# Patient Record
Sex: FEMALE | Race: OTHER | NOT HISPANIC OR LATINO | Employment: UNEMPLOYED | URBAN - METROPOLITAN AREA
[De-identification: names, ages, dates, MRNs, and addresses within clinical notes are randomized per-mention and may not be internally consistent; named-entity substitution may affect disease eponyms.]

---

## 2017-01-24 RX ORDER — LISINOPRIL 5 MG/1
5 TABLET ORAL DAILY
Qty: 90 TABLET | Refills: 1 | Status: SHIPPED | OUTPATIENT
Start: 2017-01-24 | End: 2017-07-10 | Stop reason: SDUPTHER

## 2017-02-24 ENCOUNTER — OFFICE VISIT (OUTPATIENT)
Dept: FAMILY MEDICINE | Facility: CLINIC | Age: 72
End: 2017-02-24
Payer: MEDICARE

## 2017-02-24 VITALS
HEIGHT: 60 IN | HEART RATE: 78 BPM | WEIGHT: 123.44 LBS | BODY MASS INDEX: 24.23 KG/M2 | SYSTOLIC BLOOD PRESSURE: 136 MMHG | DIASTOLIC BLOOD PRESSURE: 60 MMHG | RESPIRATION RATE: 14 BRPM | TEMPERATURE: 99 F

## 2017-02-24 DIAGNOSIS — J44.9 CHRONIC OBSTRUCTIVE PULMONARY DISEASE, UNSPECIFIED COPD TYPE: ICD-10-CM

## 2017-02-24 DIAGNOSIS — N39.0 URINARY TRACT INFECTION WITH HEMATURIA, SITE UNSPECIFIED: ICD-10-CM

## 2017-02-24 DIAGNOSIS — R31.9 BLOOD IN URINE: ICD-10-CM

## 2017-02-24 DIAGNOSIS — R30.0 BURNING WITH URINATION: Primary | ICD-10-CM

## 2017-02-24 DIAGNOSIS — R31.9 URINARY TRACT INFECTION WITH HEMATURIA, SITE UNSPECIFIED: ICD-10-CM

## 2017-02-24 LAB
BILIRUB SERPL-MCNC: ABNORMAL MG/DL
BLOOD URINE, POC: 250
COLOR, POC UA: ABNORMAL
GLUCOSE UR QL STRIP: NORMAL
KETONES UR QL STRIP: NEGATIVE
LEUKOCYTE ESTERASE URINE, POC: ABNORMAL
NITRITE, POC UA: POSITIVE
PH, POC UA: 5
PROTEIN, POC: NEGATIVE
SPECIFIC GRAVITY, POC UA: 1.01
UROBILINOGEN, POC UA: NORMAL

## 2017-02-24 PROCEDURE — 81002 URINALYSIS NONAUTO W/O SCOPE: CPT | Mod: S$GLB,,, | Performed by: NURSE PRACTITIONER

## 2017-02-24 PROCEDURE — 87088 URINE BACTERIA CULTURE: CPT

## 2017-02-24 PROCEDURE — 1160F RVW MEDS BY RX/DR IN RCRD: CPT | Mod: S$GLB,,, | Performed by: NURSE PRACTITIONER

## 2017-02-24 PROCEDURE — 3075F SYST BP GE 130 - 139MM HG: CPT | Mod: S$GLB,,, | Performed by: NURSE PRACTITIONER

## 2017-02-24 PROCEDURE — 3078F DIAST BP <80 MM HG: CPT | Mod: S$GLB,,, | Performed by: NURSE PRACTITIONER

## 2017-02-24 PROCEDURE — 87086 URINE CULTURE/COLONY COUNT: CPT

## 2017-02-24 PROCEDURE — 87186 SC STD MICRODIL/AGAR DIL: CPT

## 2017-02-24 PROCEDURE — 1125F AMNT PAIN NOTED PAIN PRSNT: CPT | Mod: S$GLB,,, | Performed by: NURSE PRACTITIONER

## 2017-02-24 PROCEDURE — 1159F MED LIST DOCD IN RCRD: CPT | Mod: S$GLB,,, | Performed by: NURSE PRACTITIONER

## 2017-02-24 PROCEDURE — 99499 UNLISTED E&M SERVICE: CPT | Mod: S$GLB,,, | Performed by: NURSE PRACTITIONER

## 2017-02-24 PROCEDURE — 99214 OFFICE O/P EST MOD 30 MIN: CPT | Mod: 25,S$GLB,, | Performed by: NURSE PRACTITIONER

## 2017-02-24 PROCEDURE — 1157F ADVNC CARE PLAN IN RCRD: CPT | Mod: S$GLB,,, | Performed by: NURSE PRACTITIONER

## 2017-02-24 PROCEDURE — 87077 CULTURE AEROBIC IDENTIFY: CPT

## 2017-02-24 RX ORDER — NITROFURANTOIN 25; 75 MG/1; MG/1
100 CAPSULE ORAL 2 TIMES DAILY
Qty: 14 CAPSULE | Refills: 0 | Status: SHIPPED | OUTPATIENT
Start: 2017-02-24 | End: 2017-02-27

## 2017-02-24 RX ORDER — PHENAZOPYRIDINE HYDROCHLORIDE 100 MG/1
100 TABLET, FILM COATED ORAL 3 TIMES DAILY PRN
Qty: 6 TABLET | Refills: 0 | Status: SHIPPED | OUTPATIENT
Start: 2017-02-24 | End: 2017-03-06

## 2017-02-24 NOTE — PROGRESS NOTES
Subjective:       Patient ID: Cassidy Stanford is a 71 y.o. female.    Chief Complaint: burning urination, seen blood when she wiped    Dysuria    This is a new problem. The current episode started yesterday. The problem occurs every urination. The problem has been gradually worsening. The quality of the pain is described as burning. The pain is at a severity of 5/10. The pain is moderate. There has been no fever. She is sexually active. There is no history of pyelonephritis. Associated symptoms include a discharge, frequency and hematuria. Pertinent negatives include no chills, flank pain, nausea, possible pregnancy, sweats, vomiting or weight loss. She has tried increased fluids for the symptoms. The treatment provided no relief. Her past medical history is significant for hypertension. There is no history of catheterization, diabetes insipidus, diabetes mellitus, genitourinary reflux, kidney stones, recurrent UTIs, a single kidney, STD, urinary stasis or a urological procedure.        The following portion of the patients history was reviewed and updated as appropriate: allergies, current medications, past medical and surgical history. Past social history and problem list reviewed. Family PMH and Past social history reviewed. Tobacco, Illicit drug use reviewed.     Review of Systems   Constitutional: Positive for fatigue. Negative for chills, fever and weight loss.   HENT: Negative.    Respiratory: Positive for wheezing. Negative for cough, chest tightness and shortness of breath.         COPD/Asthma. Using her inhaler daily. Having more wheezing lately due to pollen.    Cardiovascular: Negative for chest pain and palpitations.   Gastrointestinal: Negative for abdominal pain, diarrhea, nausea and vomiting.   Genitourinary: Positive for dysuria, frequency and hematuria. Negative for flank pain.   Musculoskeletal: Negative for back pain.   Neurological: Negative for dizziness and headaches.       Objective:     BP  136/60  Pulse 78  Temp 98.8 °F (37.1 °C) (Oral)   Resp 14  Ht 5' (1.524 m)  Wt 56 kg (123 lb 7.3 oz)  BMI 24.11 kg/m2     Physical Exam     Constitutional: oriented to person, place, and time. well-developed and well-nourished.   Head: Normocephalic.   Eyes: Conjunctivae are normal. Pupils are equal, round, and reactive to light.   Cardiovascular: Normal rate, regular rhythm and normal heart sounds.    Pulmonary/Chest: Effort normal. No distress. End exp wheezing mild. No rales or rhonchi.   Abdominal: Soft. Bowel sounds are normal. No distension. There is no tenderness.   Musculoskeletal: Normal range of motion. Gait and coordination normal   Neurological: oriented to person, place, and time.   Skin: Skin is warm and dry. No rashes or lesions  Psychiatric: Normal mood and affect.Behavior is normal. Judgment and thought content normal.   Assessment:       1. Burning with urination    2. Blood in urine    3. Urinary tract infection with hematuria, site unspecified        Plan:         Cassidy was seen today for burning urination, seen blood when she wiped.    Diagnoses and all orders for this visit:    Burning with urination  -     POCT urine dipstick without microscope    Blood in urine  -     POCT urine dipstick without microscope    Urinary tract infection with hematuria, site unspecified: will send urine for culture. Treat with Macrobid BID.  Pyridium for pain. Increase fluids.  -     Urine culture    Chronic obstructive pulmonary disease, unspecified COPD type: continue to use inhaler and nebulizer.     Other orders  -     nitrofurantoin, macrocrystal-monohydrate, (MACROBID) 100 MG capsule; Take 1 capsule (100 mg total) by mouth 2 (two) times daily.  -     phenazopyridine (PYRIDIUM) 100 MG tablet; Take 1 tablet (100 mg total) by mouth 3 (three) times daily as needed for Pain.    Continue current medication  Take medications only as prescribed  Healthy diet, exercise  Adequate rest  Adequate hydration  Avoid  allergens  Avoid excessive caffeine

## 2017-02-24 NOTE — MR AVS SNAPSHOT
[place] [address] [address]  [facility] [address]  Phone: [phone]  Fax: [phone]                  Cassidy Stanford   2017 8:00 AM   Office Visit    Description:  Female : 1945   Provider:  Neha Reese NP   Department:  [facility]           Reason for Visit     burning urination, seen blood when she wiped           Diagnoses this Visit        Comments    Burning with urination    -  Primary     Blood in urine         Urinary tract infection with hematuria, site unspecified                To Do List           Future Appointments        Provider Department Dept Phone    2017 10:40 AM Coltno Brito MD [place] [facility] [phone]    2017 8:30 AM LAB, [place] [facility] [phone]    2017 9:00 AM [facility] DEXA1 [facility] [phone]      Goals (5 Years of Data)     None      Follow-Up and Disposition     Return in about 1 week (around 3/3/2017) for if symptoms are not improving or worsen..       These Medications        Disp Refills Start End    nitrofurantoin, macrocrystal-monohydrate, (MACROBID) 100 MG capsule 14 capsule 0 2017 3/3/2017    Take 1 capsule (100 mg total) by mouth 2 (two) times daily. - Oral    Pharmacy: [place] [address] [address]  Ph #: [phone]       phenazopyridine (PYRIDIUM) 100 MG tablet 6 tablet 0 2017 3/6/2017    Take 1 tablet (100 mg total) by mouth 3 (three) times daily as needed for Pain. - Oral    Pharmacy: [address]  Ph #: [phone]         [facility] On Call     [facility] Care [facility] -  Assistance  Registered nurses in the [facility] provide clinical advisement, health education, appointment booking, and other advisory services.  Call for this free service at [phone].              Medications           Message regarding Medications     Verify the changes and/or additions to your medication regime listed below are the same as discussed with your clinician today.  If any of these changes or additions are incorrect, please notify your healthcare provider.        START taking these NEW medications        Refills    nitrofurantoin, macrocrystal-monohydrate, (MACROBID) 100 MG capsule 0    Sig: Take 1 capsule (100 mg total) by mouth 2 (two) times daily.    Class: Normal    Route: Oral    phenazopyridine (PYRIDIUM) 100 MG tablet 0    Sig: Take 1 tablet (100 mg total) by mouth 3 (three) times daily as needed for Pain.    Class: Normal    Route: Oral           Verify that the below list of medications is an accurate representation of the medications you are currently taking.  If none reported, the list may be blank. If incorrect, please contact your healthcare provider. Carry this list with you in case of emergency.           Current Medications     aspirin 81 mg Tab Take 1 tablet by mouth Daily.    CALCIUM CARBONATE/VITAMIN D3 (CALCIUM 500 + D ORAL) Take 1 tablet by mouth once daily.    cholecalciferol, vitamin D3, (VITAMIN D3) 5,000 unit Tab Take 5,000 Units by mouth twice a week.    fluticasone-vilanterol (BREO ELLIPTA) 200-25 mcg/dose DsDv diskus inhaler Inhale 1 puff into the lungs once daily.    glucosamine-chondroitin 500-400 mg tablet Take 1 tablet by mouth once daily.     lisinopril (PRINIVIL,ZESTRIL) 5 MG tablet Take 1 tablet (5 mg total) by mouth once daily.    pravastatin (PRAVACHOL) 20 MG tablet TAKE 1 TABLET (20 MG TOTAL) BY MOUTH ONCE DAILY.    umeclidinium (INCRUSE ELLIPTA) 62.5 mcg/actuation DsDv Inhale 1 puff into the lungs once daily.    albuterol (ACCUNEB) 1.25 mg/3 mL Nebu Take 1.25 mg by nebulization every 6 (six) hours as needed.    nitrofurantoin, macrocrystal-monohydrate, (MACROBID) 100 MG capsule Take 1 capsule (100 mg total) by mouth 2 (two) times daily.    phenazopyridine  (PYRIDIUM) 100 MG tablet Take 1 tablet (100 mg total) by mouth 3 (three) times daily as needed for Pain.           Clinical Reference Information           Your Vitals Were     BP                   136/60           Blood Pressure          Most Recent Value    BP  136/60      Allergies as of 2/24/2017     Penicillins    Sulfa (Sulfonamide Antibiotics)      Immunizations Administered on Date of Encounter - 2/24/2017     None      Orders Placed During Today's Visit      Normal Orders This Visit    POCT urine dipstick without microscope       Language Assistance Services     ATTENTION: Language assistance services are available, free of charge. Please call [phone].      ATENCIÓN: Si habla fatmata, tiene a boyd disposición servicios gratuitos de asistencia lingüística. Llame al [phone].     DARCI Ý: N?u b?n nói Ti?ng Vi?t, có các d?ch v? h? tr? ngôn ng? mi?n phí dành cho b?n. G?i s? [phone].         [facility] complies with applicable Federal civil rights laws and does not discriminate on the basis of race, color, national origin, age, disability, or sex.

## 2017-02-24 NOTE — PROGRESS NOTES
Answers for HPI/ROS submitted by the patient on 2/24/2017   Dysuria  Chronicity: new  Onset: yesterday  Frequency: every urination  Progression since onset: gradually worsening  Pain quality: burning  Pain - numeric: 5/10  Fever: no fever  Sexually active?: Yes  History of pyelonephritis?: No  chills: No  discharge: Yes  flank pain: No  frequency: Yes  hematuria: Yes  nausea: No  possible pregnancy: No  sweats: No  vomiting: No  weight loss: No  Treatments tried: nothing  Pain severity: moderate  catheterization: No  diabetes insipidus: No  diabetes mellitus: No  genitourinary reflux: No  hypertension: Yes  recurrent UTIs: No  single kidney: No  STD: No  urinary stasis: No  urological procedure: No  kidney stones: No

## 2017-02-27 ENCOUNTER — PATIENT MESSAGE (OUTPATIENT)
Dept: FAMILY MEDICINE | Facility: CLINIC | Age: 72
End: 2017-02-27

## 2017-02-27 LAB — BACTERIA UR CULT: NORMAL

## 2017-02-27 RX ORDER — CIPROFLOXACIN 250 MG/1
250 TABLET, FILM COATED ORAL 2 TIMES DAILY
Qty: 10 TABLET | Refills: 0 | Status: SHIPPED | OUTPATIENT
Start: 2017-02-27 | End: 2017-03-04

## 2017-02-27 NOTE — TELEPHONE ENCOUNTER
Her urine culture grew Proteus Mirabilis. Need to change the macrobid to Cipro. Will send new script to the pharmacy.

## 2017-03-08 NOTE — TELEPHONE ENCOUNTER
Did I need to do anything for her? Does she need the pyridium resent or is everything all right?

## 2017-03-08 NOTE — TELEPHONE ENCOUNTER
Spoke with patients pharmacy they stated that patient picked up antibiotic but did not get pain medication.

## 2017-04-20 ENCOUNTER — OFFICE VISIT (OUTPATIENT)
Dept: OPTOMETRY | Facility: CLINIC | Age: 72
End: 2017-04-20
Payer: MEDICARE

## 2017-04-20 DIAGNOSIS — H52.4 MYOPIA WITH ASTIGMATISM AND PRESBYOPIA, BILATERAL: ICD-10-CM

## 2017-04-20 DIAGNOSIS — H52.203 MYOPIA WITH ASTIGMATISM AND PRESBYOPIA, BILATERAL: ICD-10-CM

## 2017-04-20 DIAGNOSIS — H52.13 MYOPIA WITH ASTIGMATISM AND PRESBYOPIA, BILATERAL: ICD-10-CM

## 2017-04-20 DIAGNOSIS — H43.813 POSTERIOR VITREOUS DETACHMENT, BILATERAL: ICD-10-CM

## 2017-04-20 DIAGNOSIS — Z13.5 GLAUCOMA SCREENING: ICD-10-CM

## 2017-04-20 DIAGNOSIS — H25.13 NUCLEAR SCLEROSIS, BILATERAL: Primary | ICD-10-CM

## 2017-04-20 PROCEDURE — 92015 DETERMINE REFRACTIVE STATE: CPT | Mod: S$GLB,,, | Performed by: OPTOMETRIST

## 2017-04-20 PROCEDURE — 99999 PR PBB SHADOW E&M-EST. PATIENT-LVL II: CPT | Mod: PBBFAC,,, | Performed by: OPTOMETRIST

## 2017-04-20 PROCEDURE — 92004 COMPRE OPH EXAM NEW PT 1/>: CPT | Mod: S$GLB,,, | Performed by: OPTOMETRIST

## 2017-04-20 NOTE — MR AVS SNAPSHOT
[place] [facility]  [address] [facility] [address]  [facility] [address]  Phone: [phone]  Fax: [phone]                  Cassidy Stanford   2017 8:00 AM   Office Visit    Description:  Female : 1945   Provider:  ALEX Logan OD   Department:  [place] [facility]           Reason for Visit     Concerns About Ocular Health           Diagnoses this Visit        Comments    Nuclear sclerosis, bilateral    -  Primary     Posterior vitreous detachment, bilateral         Glaucoma screening         Myopia with astigmatism and presbyopia, bilateral                To Do List           Future Appointments        Provider Department Dept Phone    2017 10:40 AM Colton Brito MD [facility] Cardiology [phone]    2017 8:30 AM LAB, [place] [facility] [phone]    2017 9:00 AM [place] DEXA1 [facility] [phone]      Goals (5 Years of Data)     None      Follow-Up and Disposition     Return in about 1 year (around 2018) for Annual Eye Exam.      [facility]     [facility] Care [facility] -  Assistance  Unless otherwise directed by your provider, please contact [facility], our nurse care line that is available for  assistance.     Registered nurses in the [facility] provide: appointment scheduling, clinical advisement, health education, and other advisory services.  Call: [phone] (toll free)               Medications           Message regarding Medications     Verify the changes and/or additions to your medication regime listed below are the same as discussed with your clinician today.  If any of these changes or additions are incorrect, please notify your healthcare provider.             Verify that the below list of medications is an accurate representation of the medications you are currently taking.  If none reported, the list may be blank. If incorrect, please contact your healthcare provider.  Carry this list with you in case of emergency.           Current Medications     albuterol (ACCUNEB) 1.25 mg/3 mL Nebu Take 1.25 mg by nebulization every 6 (six) hours as needed.    aspirin 81 mg Tab Take 1 tablet by mouth Daily.    CALCIUM CARBONATE/VITAMIN D3 (CALCIUM 500 + D ORAL) Take 1 tablet by mouth once daily.    cholecalciferol, vitamin D3, (VITAMIN D3) 5,000 unit Tab Take 5,000 Units by mouth twice a week.    fluticasone-vilanterol (BREO ELLIPTA) 200-25 mcg/dose DsDv diskus inhaler Inhale 1 puff into the lungs once daily.    glucosamine-chondroitin 500-400 mg tablet Take 1 tablet by mouth once daily.     lisinopril (PRINIVIL,ZESTRIL) 5 MG tablet Take 1 tablet (5 mg total) by mouth once daily.    pravastatin (PRAVACHOL) 20 MG tablet TAKE 1 TABLET (20 MG TOTAL) BY MOUTH ONCE DAILY.    umeclidinium (INCRUSE ELLIPTA) 62.5 mcg/actuation DsDv Inhale 1 puff into the lungs once daily.           Clinical Reference Information           Allergies as of 4/20/2017     Penicillins    Sulfa (Sulfonamide Antibiotics)      Immunizations Administered on Date of Encounter - 4/20/2017     None      Instructions    FLASHES / FLOATERS / POSTERIOR VITREOUS DETACHMENT    Call the clinic if you have any further changes in symptoms.  Including:  Increased numbers of floaters or flashing lights, dimness or darkness that moves through or stays constant in your vision, or any pain in the eye (s).            Early Cataracts--not visually significant for surgery consultation.    What Are Cataracts?  A clear lens in the eye focuses light. This lets the eye see images sharply. With age, the lens slowly becomes cloudy. The cloudy lens is a cataract. A cataract scatters light and makes it hard for the eye to focus. Cataracts often form in both eyes. But one lens may cloud faster than the other.      The Aging of Your Lens    Your lens may cloud so slowly that you don`t notice any vision changes at first. But as the cataract gets worse, the  "eye has a harder time focusing. In early stages, glasses may help you see better. As the lens gets cloudier, your doctor may recommend surgery to restore your vision.  DRY EYES:  Use Over The Counter artificial tears as needed for dry eye symptoms.  Some common brands include:  Systane, Optive, and Refresh.  These drops can be used as frequently as desired, but may be most helpful use during long periods of concentrated work.  For example, reading / working at the computer.  Avoid drops that "get redness out", as these contain medication that may further irritate the eyes.    ALLERGY EYES / SYMPTOMS:    Over the counter medications include--Zaditor and Alaway  Use as directed 1-2 drops daily for symptoms of itching / watering eyes.  These drops will not help for dry eye or exposure symptoms.           Language Assistance Services     ATTENTION: Language assistance services are available, free of charge. Please call [phone].      ATENCIÓN: Si habla anahumble, tiene a boyd disposición servicios gratuitos de asistencia lingüística. Llame al [phone].     DARCI Ý: N?u b?n nói Ti?ng Vi?t, có các d?ch v? h? tr? ngôn ng? mi?n phí dành cho b?n. G?i s? [phone].         [facility] complies with applicable Federal civil rights laws and does not discriminate on the basis of race, color, national origin, age, disability, or sex.        "

## 2017-04-20 NOTE — PROGRESS NOTES
HPI     Concerns About Ocular Health    Additional comments: JOSE RAFAEL Logan 9/13           Comments   Agree above  Notes slightly reduced OU         Last edited by ALEX Logan, OD on 4/20/2017  8:13 AM. (History)            Assessment /Plan     For exam results, see Encounter Report.    Nuclear sclerosis, bilateral    Posterior vitreous detachment, bilateral    Glaucoma screening    Myopia with astigmatism and presbyopia, bilateral      1. Vis sig OD>OS, not ready for consult  Gave info, cautions night driving  2. RD precautions given  3. Low suspect, mod angles, mod-large c/d, low teens IOP  Monitor  4. Updated specs rx, gave copy, fill prn    Discussed and educated patient on current findings /plan.  RTC 1 year, prn if any changes / issues

## 2017-04-20 NOTE — PATIENT INSTRUCTIONS
"FLASHES / FLOATERS / POSTERIOR VITREOUS DETACHMENT    Call the clinic if you have any further changes in symptoms.  Including:  Increased numbers of floaters or flashing lights, dimness or darkness that moves through or stays constant in your vision, or any pain in the eye (s).            Early Cataracts--not visually significant for surgery consultation.    What Are Cataracts?  A clear lens in the eye focuses light. This lets the eye see images sharply. With age, the lens slowly becomes cloudy. The cloudy lens is a cataract. A cataract scatters light and makes it hard for the eye to focus. Cataracts often form in both eyes. But one lens may cloud faster than the other.      The Aging of Your Lens    Your lens may cloud so slowly that you don`t notice any vision changes at first. But as the cataract gets worse, the eye has a harder time focusing. In early stages, glasses may help you see better. As the lens gets cloudier, your doctor may recommend surgery to restore your vision.  DRY EYES:  Use Over The Counter artificial tears as needed for dry eye symptoms.  Some common brands include:  Systane, Optive, and Refresh.  These drops can be used as frequently as desired, but may be most helpful use during long periods of concentrated work.  For example, reading / working at the computer.  Avoid drops that "get redness out", as these contain medication that may further irritate the eyes.    ALLERGY EYES / SYMPTOMS:    Over the counter medications include--Zaditor and Alaway  Use as directed 1-2 drops daily for symptoms of itching / watering eyes.  These drops will not help for dry eye or exposure symptoms.      "

## 2017-04-27 ENCOUNTER — OFFICE VISIT (OUTPATIENT)
Dept: CARDIOLOGY | Facility: CLINIC | Age: 72
End: 2017-04-27
Payer: MEDICARE

## 2017-04-27 VITALS
HEIGHT: 59 IN | SYSTOLIC BLOOD PRESSURE: 120 MMHG | HEART RATE: 66 BPM | BODY MASS INDEX: 24.62 KG/M2 | DIASTOLIC BLOOD PRESSURE: 66 MMHG | WEIGHT: 122.13 LBS

## 2017-04-27 DIAGNOSIS — J43.8 OTHER EMPHYSEMA: ICD-10-CM

## 2017-04-27 DIAGNOSIS — I65.23 BILATERAL CAROTID ARTERY STENOSIS: Primary | ICD-10-CM

## 2017-04-27 DIAGNOSIS — I10 ESSENTIAL HYPERTENSION: Chronic | ICD-10-CM

## 2017-04-27 DIAGNOSIS — E78.2 MIXED HYPERLIPIDEMIA: Chronic | ICD-10-CM

## 2017-04-27 DIAGNOSIS — I51.7 CARDIOMEGALY: ICD-10-CM

## 2017-04-27 PROCEDURE — 1160F RVW MEDS BY RX/DR IN RCRD: CPT | Mod: S$GLB,,, | Performed by: INTERNAL MEDICINE

## 2017-04-27 PROCEDURE — 99499 UNLISTED E&M SERVICE: CPT | Mod: S$GLB,,, | Performed by: INTERNAL MEDICINE

## 2017-04-27 PROCEDURE — 3074F SYST BP LT 130 MM HG: CPT | Mod: S$GLB,,, | Performed by: INTERNAL MEDICINE

## 2017-04-27 PROCEDURE — 99214 OFFICE O/P EST MOD 30 MIN: CPT | Mod: S$GLB,,, | Performed by: INTERNAL MEDICINE

## 2017-04-27 PROCEDURE — 99999 PR PBB SHADOW E&M-EST. PATIENT-LVL III: CPT | Mod: PBBFAC,,, | Performed by: INTERNAL MEDICINE

## 2017-04-27 PROCEDURE — 1159F MED LIST DOCD IN RCRD: CPT | Mod: S$GLB,,, | Performed by: INTERNAL MEDICINE

## 2017-04-27 PROCEDURE — 1126F AMNT PAIN NOTED NONE PRSNT: CPT | Mod: S$GLB,,, | Performed by: INTERNAL MEDICINE

## 2017-04-27 PROCEDURE — 1157F ADVNC CARE PLAN IN RCRD: CPT | Mod: S$GLB,,, | Performed by: INTERNAL MEDICINE

## 2017-04-27 PROCEDURE — 3078F DIAST BP <80 MM HG: CPT | Mod: S$GLB,,, | Performed by: INTERNAL MEDICINE

## 2017-04-27 NOTE — MR AVS SNAPSHOT
[facility]  [address]  [place] [address]  Phone: [phone]                  Cassidy Stanford   2017 10:40 AM   Office Visit    Description:  Female : 1945   Provider:  Colton Brito MD   Department:  [facility]           Reason for Visit     Follow-up     Hypertension     Hyperlipidemia           Diagnoses this Visit        Comments    Bilateral carotid artery stenosis    -  Primary     Essential hypertension         Mixed hyperlipidemia         Other emphysema         Cardiomegaly                To Do List           Future Appointments        Provider Department Dept Phone    2017 8:30 AM LAB, [place] [facility] [phone]    2017 9:00 AM [place] DEXA1 [facility] [phone]      Goals (5 Years of Data)     None      Follow-Up and Disposition     Return in about 1 year (around 2018).      [facility]     [facility] Nurse Care Line -  Assistance  Unless otherwise directed by your provider, please contact [facility], our nurse care line that is available for  assistance.     Registered nurses in the [facility] provide: appointment scheduling, clinical advisement, health education, and other advisory services.  Call: [phone] (toll free)               Medications           Message regarding Medications     Verify the changes and/or additions to your medication regime listed below are the same as discussed with your clinician today.  If any of these changes or additions are incorrect, please notify your healthcare provider.             Verify that the below list of medications is an accurate representation of the medications you are currently taking.  If none reported, the list may be blank. If incorrect, please contact your healthcare provider. Carry this list with you in case of emergency.           Current Medications     albuterol (ACCUNEB) 1.25 mg/3 mL Nebu Take  "1.25 mg by nebulization every 6 (six) hours as needed.    aspirin 81 mg Tab Take 1 tablet by mouth Daily.    CALCIUM CARBONATE/VITAMIN D3 (CALCIUM 500 + D ORAL) Take 1 tablet by mouth once daily.    cholecalciferol, vitamin D3, (VITAMIN D3) 5,000 unit Tab Take 5,000 Units by mouth twice a week.    fluticasone-vilanterol (BREO ELLIPTA) 200-25 mcg/dose DsDv diskus inhaler Inhale 1 puff into the lungs once daily.    glucosamine-chondroitin 500-400 mg tablet Take 1 tablet by mouth once daily.     lisinopril (PRINIVIL,ZESTRIL) 5 MG tablet Take 1 tablet (5 mg total) by mouth once daily.    pravastatin (PRAVACHOL) 20 MG tablet TAKE 1 TABLET (20 MG TOTAL) BY MOUTH ONCE DAILY.    umeclidinium (INCRUSE ELLIPTA) 62.5 mcg/actuation DsDv Inhale 1 puff into the lungs once daily.           Clinical Reference Information           Your Vitals Were     BP Pulse Height Weight BMI    120/66 66 4' 11" (1.499 m) 55.4 kg (122 lb 2.2 oz) 24.67 kg/m2      Blood Pressure          Most Recent Value    BP  120/66      Allergies as of 4/27/2017     Penicillins    Sulfa (Sulfonamide Antibiotics)      Immunizations Administered on Date of Encounter - 4/27/2017     None      Orders Placed During Today's Visit     Future Labs/Procedures Expected by Expires    NM Myocardial Perfusion Spect Multi Exer  4/27/2017 4/27/2018    NM Multi Tread Stress Cardiac Component  As directed 4/27/2018      Language Assistance Services     ATTENTION: Language assistance services are available, free of charge. Please call [phone].      ATENCIÓN: Si clairela fatmata, tiene a boyd disposición servicios gratuitos de asistencia lingüística. Llame al [phone].     DARCI Ý: N?u b?n nói Ti?ng Vi?t, có các d?ch v? h? tr? ngôn ng? mi?n phí dành cho b?n. G?i s? [phone].         [facility] complies with applicable Federal civil rights laws and does not discriminate on the basis of race, color, national origin, age, disability, or sex.        "

## 2017-04-27 NOTE — PROGRESS NOTES
Subjective:    Patient ID:  Cassidy Stanford is a 71 y.o. female who presents for follow-up of Follow-up (follow up); Hypertension; and Hyperlipidemia      HPI   Here for follow up of HTN/SOB/fam hx CAD/carotid dsx. Admits to increased SOB improved with inhaler but persistent. Patient is exercising regularly with out symptoms.Patients states is doing well no chest pain, SOB or change in exertional tolerence.     Review of Systems   Constitution: Negative for chills, decreased appetite, weakness, malaise/fatigue and night sweats.   HENT: Negative for congestion, headaches and nosebleeds.    Eyes: Negative for blurred vision and visual disturbance.   Cardiovascular: Negative for chest pain, claudication, cyanosis, dyspnea on exertion, irregular heartbeat, leg swelling, near-syncope, orthopnea, palpitations, paroxysmal nocturnal dyspnea and syncope.   Respiratory: Negative for cough and shortness of breath.    Endocrine: Negative for polyuria.   Hematologic/Lymphatic: Does not bruise/bleed easily.   Skin: Negative for flushing and rash.   Musculoskeletal: Negative for arthritis, back pain, falls, joint pain, joint swelling, muscle cramps, muscle weakness and myalgias.   Gastrointestinal: Negative for bloating, abdominal pain, change in bowel habit, heartburn, nausea and vomiting.   Genitourinary: Negative for bladder incontinence and urgency.   Neurological: Negative for dizziness, focal weakness, light-headedness and loss of balance.   Psychiatric/Behavioral: Negative for altered mental status.        Objective:    Physical Exam   Constitutional: She is oriented to person, place, and time. She appears well-developed and well-nourished.   HENT:   Head: Normocephalic.   Eyes: Conjunctivae are normal.   Neck: Normal range of motion. Neck supple. No JVD present.   Cardiovascular: Normal rate, regular rhythm, normal heart sounds and intact distal pulses.    Pulses:       Carotid pulses are 2+ on the right side, and 2+ on  the left side.       Radial pulses are 2+ on the right side, and 2+ on the left side.        Dorsalis pedis pulses are 2+ on the right side, and 2+ on the left side.        Posterior tibial pulses are 2+ on the right side, and 2+ on the left side.   Pulmonary/Chest: Effort normal and breath sounds normal.   Abdominal: Soft. Bowel sounds are normal.   Musculoskeletal: She exhibits no edema or tenderness.   Neurological: She is alert and oriented to person, place, and time. Gait normal.   Skin: Skin is warm, dry and intact. No cyanosis. Nails show no clubbing.   Psychiatric: She has a normal mood and affect. Her speech is normal and behavior is normal. Thought content normal.             ..    Chemistry        Component Value Date/Time     11/01/2016 0806    K 3.6 11/01/2016 0806     11/01/2016 0806    CO2 24 11/01/2016 0806    BUN 13 11/01/2016 0806    CREATININE 0.8 11/01/2016 0806    GLU 92 11/01/2016 0806        Component Value Date/Time    CALCIUM 8.6 (L) 11/01/2016 0806    ALKPHOS 74 11/01/2016 0806    AST 19 11/01/2016 0806    ALT 13 11/01/2016 0806    BILITOT 0.3 11/01/2016 0806            ..  Lab Results   Component Value Date    CHOL 128 11/01/2016    CHOL 176 04/04/2016    CHOL 196 09/29/2015     Lab Results   Component Value Date    HDL 46 11/01/2016    HDL 70 04/04/2016    HDL 62 09/29/2015     Lab Results   Component Value Date    LDLCALC 68.8 11/01/2016    LDLCALC 96.6 04/04/2016    LDLCALC 124.0 09/29/2015     Lab Results   Component Value Date    TRIG 66 11/01/2016    TRIG 47 04/04/2016    TRIG 50 09/29/2015     Lab Results   Component Value Date    CHOLHDL 35.9 11/01/2016    CHOLHDL 39.8 04/04/2016    CHOLHDL 31.6 09/29/2015     ..  Lab Results   Component Value Date    WBC 5.10 11/01/2016    HGB 13.3 11/01/2016    HCT 40.2 11/01/2016    MCV 92 11/01/2016     11/01/2016       Test(s) Reviewed  I have reviewed the following in detail:  [] Stress test   [] Angiography   [x]  Echocardiogram   [x] Labs   [] Other:       Assessment:         ICD-10-CM ICD-9-CM   1. Bilateral carotid artery stenosis I65.23 433.10     433.30   2. Essential hypertension I10 401.9   3. Mixed hyperlipidemia E78.2 272.2   4. Other emphysema J43.8 492.8     Problem List Items Addressed This Visit     Carotid artery stenosis - Primary    Overview     There is 40 - 49% right Internal Carotid stenosis.  There is 20 - 39% left Internal Carotid stenosis.  09/09/2014          Chronic obstructive pulmonary disease    Essential hypertension (Chronic)    Hyperlipidemia (Chronic)           Plan:           Return to clinic 1 year   Low level/low impact aerobic exercise 5x's/wk. Heart healthy diet and risk factor modification.    See labs and med orders.

## 2017-04-29 ENCOUNTER — NURSE TRIAGE (OUTPATIENT)
Dept: ADMINISTRATIVE | Facility: CLINIC | Age: 72
End: 2017-04-29

## 2017-04-30 NOTE — TELEPHONE ENCOUNTER
"  Reason for Disposition   Blood in urine (red, pink, or tea-colored)    Answer Assessment - Initial Assessment Questions  1. SEVERITY: "How bad is the pain?"  (e.g., Scale 1-10; mild, moderate, or severe)    - MILD (1-3): complains slightly about urination hurting    - MODERATE (4-7): interferes with normal activities      - SEVERE (8-10): excruciating, unwilling or unable to urinate because of the pain       5/10  2. FREQUENCY: "How many times have you had painful urination today?"       5  3. PATTERN: "Is pain present every time you urinate or just sometimes?"       Every time today  4. ONSET: "When did the painful urination start?"       This morning  5. FEVER: "Do you have a fever?" If so, ask: "What is your temperature, how was it measured, and when did it start?"      No  6. PAST UTI: "Have you had a urine infection before?" If so, ask: "When was the last time?" and "What happened that time?"       Yes.  In Feb. Given antibiotics. Feels the same.  7. CAUSE: "What do you think is causing the painful urination?"  (e.g., UTI, scratch, Herpes sore)      UTI  8. OTHER SYMPTOMS: "Do you have any other symptoms?" (e.g., flank pain, vaginal discharge, genital sores, urgency, blood in urine)      Blood on tissue x 1 when she wiped., urgency  9. PREGNANCY: "Is there any chance you are pregnant?" "When was your last menstrual period?"      No    Protocols used: ST URINATION PAIN - FEMALE-A-    "

## 2017-05-01 ENCOUNTER — OFFICE VISIT (OUTPATIENT)
Dept: FAMILY MEDICINE | Facility: CLINIC | Age: 72
End: 2017-05-01
Payer: MEDICARE

## 2017-05-01 VITALS
TEMPERATURE: 98 F | HEART RATE: 67 BPM | HEIGHT: 59 IN | WEIGHT: 123.5 LBS | SYSTOLIC BLOOD PRESSURE: 108 MMHG | DIASTOLIC BLOOD PRESSURE: 68 MMHG | BODY MASS INDEX: 24.9 KG/M2

## 2017-05-01 DIAGNOSIS — R39.9 UTI SYMPTOMS: Primary | ICD-10-CM

## 2017-05-01 DIAGNOSIS — J45.909 UNCOMPLICATED ASTHMA, UNSPECIFIED ASTHMA SEVERITY: ICD-10-CM

## 2017-05-01 LAB
BILIRUB SERPL-MCNC: ABNORMAL MG/DL
BLOOD URINE, POC: 250
COLOR, POC UA: YELLOW
GLUCOSE UR QL STRIP: ABNORMAL
KETONES UR QL STRIP: ABNORMAL
LEUKOCYTE ESTERASE URINE, POC: ABNORMAL
NITRITE, POC UA: ABNORMAL
PH, POC UA: 5
PROTEIN, POC: ABNORMAL
SPECIFIC GRAVITY, POC UA: CLEAR
UROBILINOGEN, POC UA: ABNORMAL

## 2017-05-01 PROCEDURE — 3074F SYST BP LT 130 MM HG: CPT | Mod: S$GLB,,, | Performed by: NURSE PRACTITIONER

## 2017-05-01 PROCEDURE — 3078F DIAST BP <80 MM HG: CPT | Mod: S$GLB,,, | Performed by: NURSE PRACTITIONER

## 2017-05-01 PROCEDURE — 1159F MED LIST DOCD IN RCRD: CPT | Mod: S$GLB,,, | Performed by: NURSE PRACTITIONER

## 2017-05-01 PROCEDURE — 1126F AMNT PAIN NOTED NONE PRSNT: CPT | Mod: S$GLB,,, | Performed by: NURSE PRACTITIONER

## 2017-05-01 PROCEDURE — 87088 URINE BACTERIA CULTURE: CPT

## 2017-05-01 PROCEDURE — 1160F RVW MEDS BY RX/DR IN RCRD: CPT | Mod: S$GLB,,, | Performed by: NURSE PRACTITIONER

## 2017-05-01 PROCEDURE — 99214 OFFICE O/P EST MOD 30 MIN: CPT | Mod: S$GLB,,, | Performed by: NURSE PRACTITIONER

## 2017-05-01 PROCEDURE — 99499 UNLISTED E&M SERVICE: CPT | Mod: S$GLB,,, | Performed by: NURSE PRACTITIONER

## 2017-05-01 PROCEDURE — 1157F ADVNC CARE PLAN IN RCRD: CPT | Mod: S$GLB,,, | Performed by: NURSE PRACTITIONER

## 2017-05-01 PROCEDURE — 87086 URINE CULTURE/COLONY COUNT: CPT

## 2017-05-01 PROCEDURE — 81002 URINALYSIS NONAUTO W/O SCOPE: CPT | Mod: S$GLB,,, | Performed by: NURSE PRACTITIONER

## 2017-05-01 PROCEDURE — 87077 CULTURE AEROBIC IDENTIFY: CPT

## 2017-05-01 PROCEDURE — 87186 SC STD MICRODIL/AGAR DIL: CPT

## 2017-05-01 RX ORDER — IBUPROFEN 100 MG/5ML
1000 SUSPENSION, ORAL (FINAL DOSE FORM) ORAL 2 TIMES DAILY
COMMUNITY

## 2017-05-01 RX ORDER — MONTELUKAST SODIUM 10 MG/1
10 TABLET ORAL NIGHTLY
Qty: 30 TABLET | Refills: 6 | Status: SHIPPED | OUTPATIENT
Start: 2017-05-01 | End: 2017-05-12 | Stop reason: SDUPTHER

## 2017-05-01 NOTE — MR AVS SNAPSHOT
[facility]  [address] [address] [address]  [facility] [address]  Phone: [phone]  Fax: [phone]                  Cassidy Stanford   2017 3:20 PM   Office Visit    Description:  Female : 1945   Provider:  Elmira Da Silva NP   Department:  [facility]           Reason for Visit     Urinary Tract Infection           Diagnoses this Visit        Comments    UTI symptoms    -  Primary            To Do List           Future Appointments        Provider Department Dept Phone    2017 8:30 AM LAB, [place] [facility] [phone]    2017 9:00 AM [facility] DEXA1 [facility] [phone]    2017 1:00 PM NM1 [facility] [phone]    2017 2:30 PM STRESS, [facility] [phone]      Goals (5 Years of Data)     None       These Medications        Disp Refills Start End    montelukast (SINGULAIR) 10 mg tablet 30 tablet 6 2017    Take 1 tablet (10 mg total) by mouth every evening. - Oral    Pharmacy: [address]  #: [phone]         [facility]     [facility] Care [facility] -  Assistance  Unless otherwise directed by your provider, please contact [facility], our nurse care line that is available for  assistance.     Registered nurses in the [facility] provide: appointment scheduling, clinical advisement, health education, and other advisory services.  Call: [phone] (toll free)               Medications           Message regarding Medications     Verify the changes and/or additions to your medication regime listed below are the same as discussed with your clinician today.  If any of these changes or additions are incorrect, please notify your healthcare provider.        START taking these NEW medications        Refills    montelukast (SINGULAIR)  "10 mg tablet 6    Sig: Take 1 tablet (10 mg total) by mouth every evening.    Class: Normal    Route: Oral           Verify that the below list of medications is an accurate representation of the medications you are currently taking.  If none reported, the list may be blank. If incorrect, please contact your healthcare provider. Carry this list with you in case of emergency.           Current Medications     albuterol (ACCUNEB) 1.25 mg/3 mL Nebu Take 1.25 mg by nebulization every 6 (six) hours as needed.    ascorbic acid, vitamin C, (VITAMIN C) 1000 MG tablet Take 1,000 mg by mouth once daily.    aspirin 81 mg Tab Take 1 tablet by mouth Daily.    CALCIUM CARBONATE/VITAMIN D3 (CALCIUM 500 + D ORAL) Take 1 tablet by mouth once daily.    cholecalciferol, vitamin D3, (VITAMIN D3) 5,000 unit Tab Take 5,000 Units by mouth twice a week.    fluticasone-vilanterol (BREO ELLIPTA) 200-25 mcg/dose DsDv diskus inhaler Inhale 1 puff into the lungs once daily.    glucosamine-chondroitin 500-400 mg tablet Take 1 tablet by mouth once daily.     lisinopril (PRINIVIL,ZESTRIL) 5 MG tablet Take 1 tablet (5 mg total) by mouth once daily.    pravastatin (PRAVACHOL) 20 MG tablet TAKE 1 TABLET (20 MG TOTAL) BY MOUTH ONCE DAILY.    umeclidinium (INCRUSE ELLIPTA) 62.5 mcg/actuation DsDv Inhale 1 puff into the lungs once daily.    montelukast (SINGULAIR) 10 mg tablet Take 1 tablet (10 mg total) by mouth every evening.           Clinical Reference Information           Your Vitals Were     BP Pulse Temp Height Weight BMI    108/68 (BP Location: Right arm, Patient Position: Sitting, BP Method: Manual) 67 97.8 °F (36.6 °C) (Oral) 4' 11" (1.499 m) 56 kg (123 lb 8.4 oz) 24.95 kg/m2      Blood Pressure          Most Recent Value    BP  108/68      Allergies as of 5/1/2017     Penicillins    Sulfa (Sulfonamide Antibiotics)      Immunizations Administered on Date of Encounter - 5/1/2017     None      Orders Placed During Today's Visit      Normal " Orders This Visit    POCT URINE DIPSTICK WITHOUT MICROSCOPE          5/1/2017  3:38 PM - Karine GUARDADO White      Component Results     Component    Color, UA    yellow    Spec Grav UA    clear    pH, UA    5    WBC, UA    +    Nitrite, UA    -    Protein    trace    Glucose, UA    norm    Ketones, UA    -    Urobilinogen, UA    norm    Bilirubin    -    Blood, UA    250            Language Assistance Services     ATTENTION: Language assistance services are available, free of charge. Please call [phone].      ATENCIÓN: Si habla español, tiene a boyd disposición servicios gratuitos de asistencia lingüística. Llame al [phone].     CHÚ Ý: N?u b?n nói Ti?ng Vi?t, có các d?ch v? h? tr? ngôn ng? mi?n phí dành cho b?n. G?i s? [phone].         [facility] complies with applicable Federal civil rights laws and does not discriminate on the basis of race, color, national origin, age, disability, or sex.

## 2017-05-01 NOTE — PROGRESS NOTES
Subjective:       Patient ID: Cassidy Stanford is a 71 y.o. female.    Chief Complaint: Urinary Tract Infection (Pt c/o burning upon urination with a tinge of blood on Saturday and this morning.)    HPI Comments: The patient does have asthma.  Her asthma symptoms are much better improved now that she is on BREO. She tells me lately she has been haivng some increased asthma symptoms such as cough, wheezing and the pulmonologist suggested Singulair.  She has not started this but is interested in trying this.    Dysuria    This is a new problem. The current episode started yesterday. The problem occurs every urination. The problem has been waxing and waning. The quality of the pain is described as burning. The pain is at a severity of 5/10. The pain is moderate. There has been no fever. She is sexually active. There is no history of pyelonephritis. Associated symptoms include frequency, hematuria and urgency. Pertinent negatives include no behavior changes, chills, flank pain, hesitancy, nausea, possible pregnancy, sweats, vomiting, weight loss, constipation, rash or withholding. She has tried nothing for the symptoms. The treatment provided no relief. Her past medical history is significant for hypertension. There is no history of catheterization, diabetes insipidus, diabetes mellitus, genitourinary reflux, kidney stones, recurrent UTIs, a single kidney, STD, urinary stasis or a urological procedure.     Review of Systems   Constitutional: Negative for appetite change, chills, fever and weight loss.   HENT: Negative for ear pain and postnasal drip.    Eyes: Negative for pain and itching.   Respiratory: Negative for chest tightness and shortness of breath.    Gastrointestinal: Negative for abdominal distention, abdominal pain, constipation, nausea and vomiting.   Endocrine: Negative for polydipsia and polyuria.   Genitourinary: Positive for dysuria, frequency, hematuria and urgency. Negative for difficulty urinating,  "flank pain and hesitancy.   Skin: Negative for color change, pallor and rash.   Neurological: Negative for light-headedness and headaches.   Hematological: Negative for adenopathy. Does not bruise/bleed easily.   Psychiatric/Behavioral: Negative for agitation.       Objective:       Vitals:    05/01/17 1512   BP: 108/68   Pulse: 67   Temp: 97.8 °F (36.6 °C)   TempSrc: Oral   Weight: 56 kg (123 lb 8.4 oz)   Height: 4' 11" (1.499 m)   PainSc: 0-No pain       Physical Exam   Constitutional: She is oriented to person, place, and time. She appears well-developed and well-nourished.   HENT:   Head: Normocephalic and atraumatic.   Right Ear: External ear normal.   Left Ear: External ear normal.   Nose: Nose normal.   Mouth/Throat: Oropharynx is clear and moist.   Eyes: Conjunctivae are normal. Right eye exhibits no discharge. Left eye exhibits no discharge. No scleral icterus.   Neck: Normal range of motion. Neck supple. No tracheal deviation present.   Cardiovascular: Normal rate, regular rhythm and normal heart sounds.  Exam reveals no friction rub.    No murmur heard.  Pulmonary/Chest: Effort normal and breath sounds normal. No stridor. No respiratory distress. She has no wheezes. She has no rales. She exhibits no tenderness.   Musculoskeletal: Normal range of motion.   Lymphadenopathy:     She has no cervical adenopathy.   Neurological: She is alert and oriented to person, place, and time.   Skin: Skin is warm and dry.   Psychiatric: She has a normal mood and affect.       Assessment:       1. UTI symptoms        Plan:       Cassidy was seen today for urinary tract infection.    Diagnoses and all orders for this visit:    UTI symptoms  The POCT was inconclusive, will wait for culture.   -     POCT URINE DIPSTICK WITHOUT MICROSCOPE  -     Urine culture  Results for orders placed or performed in visit on 05/01/17   Urine culture   Result Value Ref Range    Urine Culture, Routine PROTEUS MIRABILIS  10,000 - 49,999 cfu/ml   "        Susceptibility    Proteus mirabilis - CULTURE, URINE     Amp/Sulbactam <=8/4 Sensitive mcg/mL     Ampicillin <=8 Sensitive mcg/mL     Amox/K Clav'ate <=8/4 Sensitive mcg/mL     Ceftriaxone <=8 Sensitive mcg/mL     Cefazolin <=8 Sensitive mcg/mL     Ciprofloxacin <=1 Sensitive mcg/mL     Cefepime <=8 Sensitive mcg/mL     Ertapenem <=2 Sensitive mcg/mL     Gentamicin <=4 Sensitive mcg/mL     Meropenem <=4 Sensitive mcg/mL     Piperacillin/Tazo <=16 Sensitive mcg/mL     Trimeth/Sulfa <=2/38 Sensitive mcg/mL     Tobramycin <=4 Sensitive mcg/mL   POCT URINE DIPSTICK WITHOUT MICROSCOPE   Result Value Ref Range    Color, UA yellow     Spec Grav UA clear     pH, UA 5     WBC, UA +     Nitrite, UA -     Protein trace     Glucose, UA norm     Ketones, UA -     Urobilinogen, UA norm     Bilirubin -     Blood,           Asthma  -     montelukast (SINGULAIR) 10 mg tablet; Take 1 tablet (10 mg total) by mouth every evening.

## 2017-05-04 ENCOUNTER — PATIENT MESSAGE (OUTPATIENT)
Dept: FAMILY MEDICINE | Facility: CLINIC | Age: 72
End: 2017-05-04

## 2017-05-04 ENCOUNTER — TELEPHONE (OUTPATIENT)
Dept: FAMILY MEDICINE | Facility: CLINIC | Age: 72
End: 2017-05-04

## 2017-05-04 RX ORDER — NITROFURANTOIN (MACROCRYSTALS) 100 MG/1
100 CAPSULE ORAL EVERY 12 HOURS
Qty: 14 CAPSULE | Refills: 0 | Status: SHIPPED | OUTPATIENT
Start: 2017-05-04 | End: 2017-05-11

## 2017-05-04 NOTE — TELEPHONE ENCOUNTER
Her urine culture did grow bacteria (I only have the preliminary) I have sent in macrodantin and would like her to start.  I will let her know if she is resistant to the medication or if we can continue (hopefully by tomorrow.

## 2017-05-05 LAB — BACTERIA UR CULT: NORMAL

## 2017-05-08 NOTE — TELEPHONE ENCOUNTER
Notified of results and recommendation. Pt states she feels that symptoms are improving. Will call back if needed.

## 2017-05-09 DIAGNOSIS — J43.8 OTHER EMPHYSEMA: ICD-10-CM

## 2017-05-09 DIAGNOSIS — I10 HYPERTENSION, ESSENTIAL: Primary | ICD-10-CM

## 2017-05-09 DIAGNOSIS — R06.02 SHORTNESS OF BREATH: ICD-10-CM

## 2017-05-09 DIAGNOSIS — I65.23 BILATERAL CAROTID ARTERY STENOSIS: ICD-10-CM

## 2017-05-09 DIAGNOSIS — E78.2 MIXED HYPERLIPIDEMIA: ICD-10-CM

## 2017-05-11 ENCOUNTER — HOSPITAL ENCOUNTER (OUTPATIENT)
Dept: RADIOLOGY | Facility: HOSPITAL | Age: 72
Discharge: HOME | End: 2017-05-11
Attending: FAMILY MEDICINE
Payer: MEDICARE

## 2017-05-11 ENCOUNTER — CLINICAL SUPPORT (OUTPATIENT)
Dept: CARDIOLOGY | Facility: CLINIC | Age: 72
End: 2017-05-11
Payer: MEDICARE

## 2017-05-11 DIAGNOSIS — I10 ESSENTIAL HYPERTENSION: Chronic | ICD-10-CM

## 2017-05-11 DIAGNOSIS — J43.8 OTHER EMPHYSEMA: ICD-10-CM

## 2017-05-11 DIAGNOSIS — I51.7 CARDIOMEGALY: ICD-10-CM

## 2017-05-11 DIAGNOSIS — I65.23 BILATERAL CAROTID ARTERY STENOSIS: ICD-10-CM

## 2017-05-11 DIAGNOSIS — E78.2 MIXED HYPERLIPIDEMIA: Chronic | ICD-10-CM

## 2017-05-11 DIAGNOSIS — M89.9 DISORDER OF BONE: ICD-10-CM

## 2017-05-11 DIAGNOSIS — M85.80 OSTEOPENIA: ICD-10-CM

## 2017-05-11 LAB — DIASTOLIC DYSFUNCTION: NO

## 2017-05-11 PROCEDURE — 77080 DXA BONE DENSITY AXIAL: CPT | Mod: 26,,, | Performed by: RADIOLOGY

## 2017-05-11 PROCEDURE — 93015 CV STRESS TEST SUPVJ I&R: CPT | Mod: S$GLB,,, | Performed by: INTERNAL MEDICINE

## 2017-05-11 PROCEDURE — 78452 HT MUSCLE IMAGE SPECT MULT: CPT | Mod: 26,,, | Performed by: RADIOLOGY

## 2017-05-12 ENCOUNTER — PATIENT MESSAGE (OUTPATIENT)
Dept: FAMILY MEDICINE | Facility: CLINIC | Age: 72
End: 2017-05-12

## 2017-05-12 RX ORDER — MONTELUKAST SODIUM 10 MG/1
10 TABLET ORAL NIGHTLY
Qty: 90 TABLET | Refills: 3 | Status: SHIPPED | OUTPATIENT
Start: 2017-05-12 | End: 2017-06-11

## 2017-05-12 NOTE — TELEPHONE ENCOUNTER
Request from patient:    Would you send this prescription to mail order for a 90 day supply from [email or web address]. I find that my cough and shortness of breath conditions greatly improved using this medication.   Also my UTI is improved... thank you.

## 2017-05-15 ENCOUNTER — TELEPHONE (OUTPATIENT)
Dept: CARDIOLOGY | Facility: CLINIC | Age: 72
End: 2017-05-15

## 2017-05-15 NOTE — TELEPHONE ENCOUNTER
----- Message from Colton Brito MD sent at 5/15/2017  8:02 AM CDT -----  Imaging tests results are with in normal parameters. Continue current meds.

## 2017-07-10 RX ORDER — LISINOPRIL 5 MG/1
TABLET ORAL
Qty: 90 TABLET | Refills: 1 | Status: SHIPPED | OUTPATIENT
Start: 2017-07-10 | End: 2017-07-27 | Stop reason: SDUPTHER

## 2017-07-14 ENCOUNTER — TELEPHONE (OUTPATIENT)
Dept: FAMILY MEDICINE | Facility: CLINIC | Age: 72
End: 2017-07-14

## 2017-07-14 DIAGNOSIS — Z12.31 ENCOUNTER FOR SCREENING MAMMOGRAM FOR MALIGNANT NEOPLASM OF BREAST: ICD-10-CM

## 2017-07-14 DIAGNOSIS — Z12.39 ENCOUNTER FOR SCREENING FOR MALIGNANT NEOPLASM OF BREAST: Primary | ICD-10-CM

## 2017-07-14 NOTE — TELEPHONE ENCOUNTER
----- Message from Kayla Kennedy sent at 7/14/2017  2:00 PM CDT -----  Contact: sai Rojas order for [facility]   Call back

## 2017-07-20 ENCOUNTER — OFFICE VISIT (OUTPATIENT)
Dept: FAMILY MEDICINE | Facility: CLINIC | Age: 72
End: 2017-07-20
Payer: MEDICARE

## 2017-07-20 VITALS
BODY MASS INDEX: 25.46 KG/M2 | TEMPERATURE: 98 F | SYSTOLIC BLOOD PRESSURE: 118 MMHG | WEIGHT: 126.31 LBS | DIASTOLIC BLOOD PRESSURE: 60 MMHG | HEIGHT: 59 IN | HEART RATE: 64 BPM | RESPIRATION RATE: 18 BRPM

## 2017-07-20 DIAGNOSIS — L25.9 CONTACT DERMATITIS, UNSPECIFIED CONTACT DERMATITIS TYPE, UNSPECIFIED TRIGGER: Primary | ICD-10-CM

## 2017-07-20 PROCEDURE — 1157F ADVNC CARE PLAN IN RCRD: CPT | Mod: S$GLB,,, | Performed by: NURSE PRACTITIONER

## 2017-07-20 PROCEDURE — 1126F AMNT PAIN NOTED NONE PRSNT: CPT | Mod: S$GLB,,, | Performed by: NURSE PRACTITIONER

## 2017-07-20 PROCEDURE — 1159F MED LIST DOCD IN RCRD: CPT | Mod: S$GLB,,, | Performed by: NURSE PRACTITIONER

## 2017-07-20 PROCEDURE — 99214 OFFICE O/P EST MOD 30 MIN: CPT | Mod: 25,S$GLB,, | Performed by: NURSE PRACTITIONER

## 2017-07-20 PROCEDURE — 96372 THER/PROPH/DIAG INJ SC/IM: CPT | Mod: S$GLB,,, | Performed by: NURSE PRACTITIONER

## 2017-07-20 RX ORDER — PREDNISONE 20 MG/1
40 TABLET ORAL DAILY
Qty: 20 TABLET | Refills: 0 | Status: SHIPPED | OUTPATIENT
Start: 2017-07-20 | End: 2017-07-30

## 2017-07-20 RX ORDER — DEXAMETHASONE SODIUM PHOSPHATE 4 MG/ML
8 INJECTION, SOLUTION INTRA-ARTICULAR; INTRALESIONAL; INTRAMUSCULAR; INTRAVENOUS; SOFT TISSUE
Status: COMPLETED | OUTPATIENT
Start: 2017-07-20 | End: 2017-07-20

## 2017-07-20 RX ORDER — MONTELUKAST SODIUM 10 MG/1
10 TABLET ORAL NIGHTLY
COMMUNITY
End: 2018-03-12 | Stop reason: SDUPTHER

## 2017-07-20 RX ADMIN — DEXAMETHASONE SODIUM PHOSPHATE 8 MG: 4 INJECTION, SOLUTION INTRA-ARTICULAR; INTRALESIONAL; INTRAMUSCULAR; INTRAVENOUS; SOFT TISSUE at 02:07

## 2017-07-21 NOTE — PROGRESS NOTES
"Subjective:       Patient ID: Cassidy Stanford is a 71 y.o. female.    Chief Complaint: Rash    Rash   This is a new problem. The current episode started in the past 7 days. The problem has been rapidly worsening since onset. The rash isdiffuse. Location: Arms and abdomen. The rash is characterized by itchiness. Associated with: Poison ivy. Associated symptoms include coughing. Pertinent negatives include no anorexia, congestion, diarrhea, eye pain, facial edema, fatigue, fever, joint pain, nail changes, rhinorrhea, shortness of breath, sore throat or vomiting. Past treatments include anti-itch cream, antibiotic cream and moisturizer. The treatment provided no relief. Her past medical history is significant for allergies, asthma and varicella. There is no history of eczema.     Review of Systems   Constitutional: Negative for fatigue and fever.   HENT: Negative for congestion, rhinorrhea and sore throat.    Eyes: Negative for pain.   Respiratory: Positive for cough. Negative for shortness of breath.    Gastrointestinal: Negative for anorexia, diarrhea and vomiting.   Musculoskeletal: Negative for joint pain.   Skin: Positive for rash. Negative for nail changes.       Objective:       Vitals:    07/20/17 1331   BP: 118/60   Pulse: 64   Resp: 18   Temp: 97.7 °F (36.5 °C)   TempSrc: Oral   Weight: 57.3 kg (126 lb 5.2 oz)   Height: 4' 11" (1.499 m)   PainSc: 0-No pain       Physical Exam   Constitutional: She is oriented to person, place, and time. She appears well-developed and well-nourished.   HENT:   Head: Normocephalic and atraumatic.   Right Ear: External ear normal.   Left Ear: External ear normal.   Nose: Nose normal.   Mouth/Throat: Oropharynx is clear and moist.   Eyes: Conjunctivae are normal. Right eye exhibits no discharge. Left eye exhibits no discharge. No scleral icterus.   Neck: Normal range of motion. Neck supple. No tracheal deviation present.   Cardiovascular: Normal rate, regular rhythm and normal " heart sounds.  Exam reveals no friction rub.    No murmur heard.  Pulmonary/Chest: Effort normal and breath sounds normal. No stridor. No respiratory distress. She has no wheezes. She has no rales. She exhibits no tenderness.   Musculoskeletal: Normal range of motion.   Lymphadenopathy:     She has no cervical adenopathy.   Neurological: She is alert and oriented to person, place, and time.   Skin: Skin is warm and dry.   There are multiple linear erythematous papules and vesicles to the arms bilaterally.  The area over the antecubital is oozing clear fluid.  There are scattered papules of the abdomen as well.   Psychiatric: She has a normal mood and affect.       Assessment:       1. Contact dermatitis, unspecified contact dermatitis type, unspecified trigger        Plan:       Cassidy was seen today for rash.    Diagnoses and all orders for this visit:    Contact dermatitis, unspecified contact dermatitis type, unspecified trigger  -     dexamethasone injection 8 mg; Inject 2 mLs (8 mg total) into the vein one time.    Other orders  -     predniSONE (DELTASONE) 20 MG tablet; Take 2 tablets (40 mg total) by mouth once daily.

## 2017-07-25 ENCOUNTER — DOCUMENTATION ONLY (OUTPATIENT)
Dept: FAMILY MEDICINE | Facility: CLINIC | Age: 72
End: 2017-07-25

## 2017-07-27 RX ORDER — LISINOPRIL 5 MG/1
5 TABLET ORAL DAILY
Qty: 90 TABLET | Refills: 0 | Status: SHIPPED | OUTPATIENT
Start: 2017-07-27 | End: 2017-11-28

## 2017-07-28 ENCOUNTER — HOSPITAL ENCOUNTER (OUTPATIENT)
Dept: RADIOLOGY | Facility: HOSPITAL | Age: 72
Discharge: HOME | End: 2017-07-28
Attending: FAMILY MEDICINE
Payer: MEDICARE

## 2017-07-28 DIAGNOSIS — Z12.39 ENCOUNTER FOR SCREENING FOR MALIGNANT NEOPLASM OF BREAST: ICD-10-CM

## 2017-07-28 DIAGNOSIS — Z12.31 ENCOUNTER FOR SCREENING MAMMOGRAM FOR MALIGNANT NEOPLASM OF BREAST: ICD-10-CM

## 2017-07-28 PROCEDURE — 77067 SCR MAMMO BI INCL CAD: CPT | Mod: 26,,, | Performed by: RADIOLOGY

## 2017-07-28 PROCEDURE — 77063 BREAST TOMOSYNTHESIS BI: CPT | Mod: 26,,, | Performed by: RADIOLOGY

## 2017-07-28 PROCEDURE — 77067 SCR MAMMO BI INCL CAD: CPT | Mod: TC

## 2017-07-28 NOTE — TELEPHONE ENCOUNTER
Placed call to patient, left message that appointment needs to be made in office. Requested that patient call back.

## 2017-07-31 ENCOUNTER — PATIENT MESSAGE (OUTPATIENT)
Dept: FAMILY MEDICINE | Facility: CLINIC | Age: 72
End: 2017-07-31

## 2017-10-04 ENCOUNTER — OFFICE VISIT (OUTPATIENT)
Dept: CARDIOLOGY | Facility: CLINIC | Age: 72
End: 2017-10-04
Payer: MEDICARE

## 2017-10-04 VITALS
HEIGHT: 59 IN | HEART RATE: 57 BPM | SYSTOLIC BLOOD PRESSURE: 137 MMHG | BODY MASS INDEX: 25.29 KG/M2 | WEIGHT: 125.44 LBS | DIASTOLIC BLOOD PRESSURE: 71 MMHG

## 2017-10-04 DIAGNOSIS — E78.2 MIXED HYPERLIPIDEMIA: Chronic | ICD-10-CM

## 2017-10-04 DIAGNOSIS — I10 ESSENTIAL HYPERTENSION: Chronic | ICD-10-CM

## 2017-10-04 DIAGNOSIS — I65.23 BILATERAL CAROTID ARTERY STENOSIS: Primary | ICD-10-CM

## 2017-10-04 DIAGNOSIS — Z82.49 FAMILY HISTORY OF CARDIOVASCULAR DISEASE: Chronic | ICD-10-CM

## 2017-10-04 PROCEDURE — 99999 PR PBB SHADOW E&M-EST. PATIENT-LVL III: CPT | Mod: PBBFAC,,, | Performed by: INTERNAL MEDICINE

## 2017-10-04 PROCEDURE — 99499 UNLISTED E&M SERVICE: CPT | Mod: S$GLB,,, | Performed by: INTERNAL MEDICINE

## 2017-10-04 PROCEDURE — 99214 OFFICE O/P EST MOD 30 MIN: CPT | Mod: S$GLB,,, | Performed by: INTERNAL MEDICINE

## 2017-10-04 NOTE — PROGRESS NOTES
Subjective:    Patient ID:  Cassidy Stanford is a 71 y.o. female who presents for follow-up of No chief complaint on file.      HPI  Here for follow up of HTN/SOB/fam hx CAD/carotid dsx. Patients states is doing well no chest pain, SOB or change in exertional tolerence. Patient is exercising regularly with out symptoms.    Review of Systems   Constitution: Negative for malaise/fatigue.   Eyes: Negative for blurred vision.   Cardiovascular: Negative for chest pain, claudication, cyanosis, dyspnea on exertion, irregular heartbeat, leg swelling, near-syncope, orthopnea, palpitations, paroxysmal nocturnal dyspnea and syncope.   Respiratory: Negative for cough and shortness of breath.    Hematologic/Lymphatic: Does not bruise/bleed easily.   Musculoskeletal: Negative for back pain, falls, joint pain, muscle cramps, muscle weakness and myalgias.   Gastrointestinal: Negative for abdominal pain, change in bowel habit, nausea and vomiting.   Genitourinary: Negative for urgency.   Neurological: Negative for dizziness, focal weakness and light-headedness.        Objective:    Physical Exam   Constitutional: She is oriented to person, place, and time. She appears well-developed and well-nourished.   HENT:   Head: Normocephalic.   Eyes: Conjunctivae are normal.   Neck: Normal range of motion. Neck supple. No JVD present.   Cardiovascular: Normal rate, regular rhythm, normal heart sounds and intact distal pulses.    Pulses:       Carotid pulses are 2+ on the right side, and 2+ on the left side.       Radial pulses are 2+ on the right side, and 2+ on the left side.        Dorsalis pedis pulses are 2+ on the right side, and 2+ on the left side.        Posterior tibial pulses are 2+ on the right side, and 2+ on the left side.   Pulmonary/Chest: Effort normal and breath sounds normal.   Abdominal: Soft. Bowel sounds are normal.   Musculoskeletal: She exhibits no edema or tenderness.   Neurological: She is alert and oriented to person,  place, and time. Gait normal.   Skin: Skin is warm, dry and intact. No cyanosis. Nails show no clubbing.   Psychiatric: She has a normal mood and affect. Her speech is normal and behavior is normal. Thought content normal.             ..    Chemistry        Component Value Date/Time     05/11/2017 0838    K 4.4 05/11/2017 0838     05/11/2017 0838    CO2 26 05/11/2017 0838    BUN 14 05/11/2017 0838    CREATININE 0.8 05/11/2017 0838    GLU 83 05/11/2017 0838        Component Value Date/Time    CALCIUM 10.3 05/11/2017 0838    ALKPHOS 77 05/11/2017 0838    AST 25 05/11/2017 0838    ALT 18 05/11/2017 0838    BILITOT 0.3 05/11/2017 0838    ESTGFRAFRICA >60.0 05/11/2017 0838    EGFRNONAA >60.0 05/11/2017 0838            ..  Lab Results   Component Value Date    CHOL 206 (H) 05/11/2017    CHOL 128 11/01/2016    CHOL 176 04/04/2016     Lab Results   Component Value Date    HDL 71 05/11/2017    HDL 46 11/01/2016    HDL 70 04/04/2016     Lab Results   Component Value Date    LDLCALC 125.8 05/11/2017    LDLCALC 68.8 11/01/2016    LDLCALC 96.6 04/04/2016     Lab Results   Component Value Date    TRIG 46 05/11/2017    TRIG 66 11/01/2016    TRIG 47 04/04/2016     Lab Results   Component Value Date    CHOLHDL 34.5 05/11/2017    CHOLHDL 35.9 11/01/2016    CHOLHDL 39.8 04/04/2016     ..  Lab Results   Component Value Date    WBC 5.10 11/01/2016    HGB 13.3 11/01/2016    HCT 40.2 11/01/2016    MCV 92 11/01/2016     11/01/2016       Test(s) Reviewed  I have reviewed the following in detail:  [] Stress test   [] Angiography   [x] Echocardiogram   [x] Labs   [] Other:       Assessment:         ICD-10-CM ICD-9-CM   1. Bilateral carotid artery stenosis I65.23 433.10     433.30   2. Family history of cardiovascular disease Z82.49 V17.49   3. Mixed hyperlipidemia E78.2 272.2   4. Essential hypertension I10 401.9     Problem List Items Addressed This Visit     Carotid artery stenosis - Primary    Overview     There is 40 -  49% right Internal Carotid stenosis.  There is 20 - 39% left Internal Carotid stenosis.  09/09/2014          Essential hypertension (Chronic)    Family history of cardiovascular disease (Chronic)    Hyperlipidemia (Chronic)      Other Visit Diagnoses    None.          Plan:           Return to clinic 9 months   Low level/low impact aerobic exercise 5x's/wk. Heart healthy diet and risk factor modification.    See labs and med orders.  Lipids  Has appt with pulmonary has wet productive cough no problem changing to ARB if needed

## 2017-11-28 ENCOUNTER — OFFICE VISIT (OUTPATIENT)
Dept: FAMILY MEDICINE | Facility: CLINIC | Age: 72
End: 2017-11-28
Payer: MEDICARE

## 2017-11-28 VITALS
SYSTOLIC BLOOD PRESSURE: 130 MMHG | HEIGHT: 59 IN | WEIGHT: 124.81 LBS | HEART RATE: 64 BPM | DIASTOLIC BLOOD PRESSURE: 80 MMHG | BODY MASS INDEX: 25.16 KG/M2 | RESPIRATION RATE: 14 BRPM | TEMPERATURE: 98 F

## 2017-11-28 DIAGNOSIS — E78.5 HYPERLIPIDEMIA, UNSPECIFIED HYPERLIPIDEMIA TYPE: ICD-10-CM

## 2017-11-28 DIAGNOSIS — Z00.00 ANNUAL PHYSICAL EXAM: Primary | ICD-10-CM

## 2017-11-28 DIAGNOSIS — R79.9 ABNORMAL FINDING OF BLOOD CHEMISTRY: ICD-10-CM

## 2017-11-28 LAB
ALBUMIN SERPL BCP-MCNC: 3.7 G/DL
ALP SERPL-CCNC: 91 U/L
ALT SERPL W/O P-5'-P-CCNC: 19 U/L
ANION GAP SERPL CALC-SCNC: 8 MMOL/L
AST SERPL-CCNC: 24 U/L
BASOPHILS # BLD AUTO: 0.06 K/UL
BASOPHILS NFR BLD: 1 %
BILIRUB SERPL-MCNC: 0.4 MG/DL
BUN SERPL-MCNC: 14 MG/DL
CALCIUM SERPL-MCNC: 10 MG/DL
CHLORIDE SERPL-SCNC: 105 MMOL/L
CHOLEST SERPL-MCNC: 195 MG/DL
CHOLEST/HDLC SERPL: 3 {RATIO}
CK SERPL-CCNC: 114 U/L
CO2 SERPL-SCNC: 26 MMOL/L
CREAT SERPL-MCNC: 0.8 MG/DL
DIFFERENTIAL METHOD: ABNORMAL
EOSINOPHIL # BLD AUTO: 0.2 K/UL
EOSINOPHIL NFR BLD: 4.1 %
ERYTHROCYTE [DISTWIDTH] IN BLOOD BY AUTOMATED COUNT: 12.2 %
EST. GFR  (AFRICAN AMERICAN): >60 ML/MIN/1.73 M^2
EST. GFR  (NON AFRICAN AMERICAN): >60 ML/MIN/1.73 M^2
ESTIMATED AVG GLUCOSE: 117 MG/DL
GLUCOSE SERPL-MCNC: 90 MG/DL
HBA1C MFR BLD HPLC: 5.7 %
HCT VFR BLD AUTO: 39.4 %
HDLC SERPL-MCNC: 65 MG/DL
HDLC SERPL: 33.3 %
HGB BLD-MCNC: 12.9 G/DL
IMM GRANULOCYTES # BLD AUTO: 0.02 K/UL
IMM GRANULOCYTES NFR BLD AUTO: 0.3 %
LDLC SERPL CALC-MCNC: 117.6 MG/DL
LYMPHOCYTES # BLD AUTO: 1.1 K/UL
LYMPHOCYTES NFR BLD: 19.2 %
MCH RBC QN AUTO: 30.3 PG
MCHC RBC AUTO-ENTMCNC: 32.7 G/DL
MCV RBC AUTO: 93 FL
MONOCYTES # BLD AUTO: 0.4 K/UL
MONOCYTES NFR BLD: 6 %
NEUTROPHILS # BLD AUTO: 4 K/UL
NEUTROPHILS NFR BLD: 69.4 %
NONHDLC SERPL-MCNC: 130 MG/DL
NRBC BLD-RTO: 0 /100 WBC
PLATELET # BLD AUTO: 329 K/UL
PMV BLD AUTO: 8.4 FL
POTASSIUM SERPL-SCNC: 4.1 MMOL/L
PROT SERPL-MCNC: 7.2 G/DL
RBC # BLD AUTO: 4.26 M/UL
SODIUM SERPL-SCNC: 139 MMOL/L
TRIGL SERPL-MCNC: 62 MG/DL
TSH SERPL DL<=0.005 MIU/L-ACNC: 2.68 UIU/ML
WBC # BLD AUTO: 5.83 K/UL

## 2017-11-28 PROCEDURE — 99499 UNLISTED E&M SERVICE: CPT | Mod: S$GLB,,, | Performed by: FAMILY MEDICINE

## 2017-11-28 PROCEDURE — 85025 COMPLETE CBC W/AUTO DIFF WBC: CPT

## 2017-11-28 PROCEDURE — 80061 LIPID PANEL: CPT

## 2017-11-28 PROCEDURE — 36415 COLL VENOUS BLD VENIPUNCTURE: CPT | Mod: S$GLB,,, | Performed by: FAMILY MEDICINE

## 2017-11-28 PROCEDURE — 84443 ASSAY THYROID STIM HORMONE: CPT

## 2017-11-28 PROCEDURE — 99397 PER PM REEVAL EST PAT 65+ YR: CPT | Mod: S$GLB,,, | Performed by: FAMILY MEDICINE

## 2017-11-28 PROCEDURE — 83036 HEMOGLOBIN GLYCOSYLATED A1C: CPT

## 2017-11-28 PROCEDURE — 80053 COMPREHEN METABOLIC PANEL: CPT

## 2017-11-28 PROCEDURE — 82550 ASSAY OF CK (CPK): CPT

## 2017-11-28 NOTE — PROGRESS NOTES
Venipuncture performed with 21 gauge butterfly, x's 1 attempt,  to L Antecubital vein.  Specimens collected per orders.      Pressure dressing applied to site, instructed patient to remove dressing in 10-15 minutes, OK to re-adjust dressing if pressure causing any discomfort, to observe closely for numbness and/or discoloration to hand or fingers, and to notify provider if bleeding persists after applying constant pressure lasting 30 minutes.                    Answers for HPI/ROS submitted by the patient on 11/25/2017   activity change: No  unexpected weight change: No  neck pain: No  hearing loss: No  rhinorrhea: No  trouble swallowing: No  eye discharge: No  visual disturbance: No  chest tightness: No  wheezing: No  chest pain: No  palpitations: No  blood in stool: No  constipation: No  vomiting: No  diarrhea: No  polydipsia: No  polyuria: No  difficulty urinating: No  hematuria: No  menstrual problem: No  dysuria: No  joint swelling: No  arthralgias: No  headaches: No  weakness: No  confusion: No  dysphoric mood: No

## 2017-11-30 NOTE — PROGRESS NOTES
"Subjective:       Patient ID: Cassidy Stanford is a 71 y.o. female.    Chief Complaint: Follow-up    HPI   The patient is a 71-year-old who is here today follow-up.  Overall, she is doing well except for her daily cough.  She is fasting today for labs.  Her immunizations are up-to-date.    Today we discussed the followin)  asthma.  She is having a daily cough.  In the morning, her cough produces "tons of mucus".  She occasionally has wheezing and estimates that this occurs twice a week.  She denies any nighttime symptoms.  She has more symptoms with weather changes and with changes in altitude when she goes to visit her children.  She is scheduled to see her pulmonologist within the next week.  She is currently taking Singulair, Breo and Incruse.  She and the pulmonologist have discussed xolair but she's been hesitant to do that.  We did review the fact that she is on an ACE inhibitor which may contribute some to her cough  2)  hypertension.  She is currently taking lisinopril 5 mg once a day.  We did discuss that ACE inhibitors can contribute cough.  She wonders if she still needs a blood pressure medicine anymore and would be interested in trying to do without it.  3)  osteopenia.  We did review her DEXA scan from May which showed osteopenia.  She's currently taking a calcium and vitamin D supplement once a day.  We did discuss increasing this twice a day which is something she thinks she could do  4)  hyperlipidemia.  She is doing well with her Pravachol.  She denies any side effects with it.  She is fasting today for labs  5)  prediabetes.  She denies any polyuria, polydipsia or polyphagia.  She would be willing to have an A1c today    Review of Systems   Constitutional: Negative for activity change, appetite change, chills, diaphoresis, fatigue, fever and unexpected weight change.   HENT: Negative for congestion, dental problem, ear pain, hearing loss, postnasal drip, rhinorrhea, sneezing, sore throat and " trouble swallowing.    Eyes: Negative for photophobia, pain, discharge and visual disturbance.   Respiratory: Positive for cough and wheezing. Negative for chest tightness and shortness of breath.    Cardiovascular: Negative for chest pain, palpitations and leg swelling.   Gastrointestinal: Negative for abdominal distention, abdominal pain, blood in stool, constipation, diarrhea, nausea and vomiting.   Endocrine: Negative for cold intolerance, heat intolerance, polydipsia and polyuria.   Genitourinary: Negative for difficulty urinating, dysuria, flank pain, frequency, genital sores, hematuria, menstrual problem and vaginal discharge.   Musculoskeletal: Negative for arthralgias, joint swelling, myalgias and neck pain.   Skin: Negative for rash.   Neurological: Negative for dizziness, syncope, weakness, light-headedness and headaches.   Hematological: Negative for adenopathy. Does not bruise/bleed easily.   Psychiatric/Behavioral: Negative for confusion, dysphoric mood, self-injury, sleep disturbance and suicidal ideas. The patient is not nervous/anxious.        Objective:      Physical Exam   Constitutional: She is oriented to person, place, and time. She appears well-developed and well-nourished. No distress.   HENT:   Head: Normocephalic and atraumatic.   Right Ear: Hearing, tympanic membrane, external ear and ear canal normal.   Left Ear: Hearing, tympanic membrane, external ear and ear canal normal.   Nose: Nose normal.   Mouth/Throat: Oropharynx is clear and moist and mucous membranes are normal. No oral lesions. No oropharyngeal exudate, posterior oropharyngeal edema or posterior oropharyngeal erythema.   Eyes: Conjunctivae, EOM and lids are normal. Pupils are equal, round, and reactive to light. No scleral icterus.   Neck: Normal range of motion. Neck supple. Carotid bruit is not present. No thyroid mass and no thyromegaly present.   Cardiovascular: Normal rate, regular rhythm and normal heart sounds.   No  "extrasystoles are present. PMI is not displaced.  Exam reveals no gallop.    No murmur heard.  Pulmonary/Chest: Effort normal and breath sounds normal. No accessory muscle usage. No respiratory distress.   Mostly clear to auscultation bilaterally with occasional wheezing noted.   Abdominal: Soft. Normal appearance and bowel sounds are normal. She exhibits no abdominal bruit. There is no hepatosplenomegaly. There is no tenderness. There is no rebound.   Lymphadenopathy:        Head (right side): No submental and no submandibular adenopathy present.        Head (left side): No submental and no submandibular adenopathy present.        Right cervical: No superficial cervical, no deep cervical and no posterior cervical adenopathy present.       Left cervical: No superficial cervical, no deep cervical and no posterior cervical adenopathy present.        Right: No supraclavicular adenopathy present.        Left: No supraclavicular adenopathy present.   Neurological: She is alert and oriented to person, place, and time. She has normal strength. No cranial nerve deficit or sensory deficit.   Skin: Skin is warm, dry and intact.   Psychiatric: She has a normal mood and affect. Her speech is normal and behavior is normal. Thought content normal. Cognition and memory are normal.     Blood pressure 130/80, pulse 64, temperature 98 °F (36.7 °C), temperature source Oral, resp. rate 14, height 4' 11" (1.499 m), weight 56.6 kg (124 lb 12.8 oz).Body mass index is 25.21 kg/m².          A/P:  1)  asthma and COPD.  Uncontrolled.  For now she will continue with her current medications but she will discuss her symptoms at her upcoming pulmonology appointment and consider the xolair.  We will be stopping the ACE inhibitor to see if this helps  2)  hypertension.  Well-controlled.  We are going to try to stop her lisinopril 5 mg and see how she does without any blood pressure medicine.  She will check her blood pressure at home daily.  In 2 " weeks, she will come and see the nurses with her blood pressure log so that we can check her blood pressure the office.  If her readings are consistently higher than 139/89, we will resume blood pressure medicine and will resume an ARB  3)  osteopenia.  Mild.  She will continue with calcium supplementation but she will increase this to twice a day  4)  hyperlipidemia.  Status unknown.  We will check fasting labs today  5)  prediabetes.  Status unknown.  We'll check an A1c today  6)  annual exam.  Health maintenance issues and anticipatory guidance issues were discussed.  Immunizations are up-to-date.  Screening tests are up-to-date.

## 2017-12-03 ENCOUNTER — PATIENT MESSAGE (OUTPATIENT)
Dept: FAMILY MEDICINE | Facility: CLINIC | Age: 72
End: 2017-12-03

## 2017-12-11 ENCOUNTER — PATIENT MESSAGE (OUTPATIENT)
Dept: FAMILY MEDICINE | Facility: CLINIC | Age: 72
End: 2017-12-11

## 2018-02-07 ENCOUNTER — PES CALL (OUTPATIENT)
Dept: ADMINISTRATIVE | Facility: CLINIC | Age: 73
End: 2018-02-07

## 2018-02-14 DIAGNOSIS — E78.5 HYPERLIPIDEMIA: Chronic | ICD-10-CM

## 2018-02-15 RX ORDER — PRAVASTATIN SODIUM 20 MG/1
20 TABLET ORAL DAILY
Qty: 90 TABLET | Refills: 3 | Status: SHIPPED | OUTPATIENT
Start: 2018-02-15 | End: 2019-02-18 | Stop reason: SDUPTHER

## 2018-02-20 ENCOUNTER — PATIENT MESSAGE (OUTPATIENT)
Dept: FAMILY MEDICINE | Facility: CLINIC | Age: 73
End: 2018-02-20

## 2018-03-01 ENCOUNTER — OFFICE VISIT (OUTPATIENT)
Dept: FAMILY MEDICINE | Facility: CLINIC | Age: 73
End: 2018-03-01
Payer: MEDICARE

## 2018-03-01 VITALS
WEIGHT: 128.31 LBS | HEART RATE: 74 BPM | BODY MASS INDEX: 25.87 KG/M2 | DIASTOLIC BLOOD PRESSURE: 60 MMHG | SYSTOLIC BLOOD PRESSURE: 135 MMHG | HEIGHT: 59 IN

## 2018-03-01 DIAGNOSIS — J43.8 OTHER EMPHYSEMA: ICD-10-CM

## 2018-03-01 DIAGNOSIS — Z00.00 ENCOUNTER FOR PREVENTIVE HEALTH EXAMINATION: Primary | ICD-10-CM

## 2018-03-01 DIAGNOSIS — I65.23 BILATERAL CAROTID ARTERY STENOSIS: ICD-10-CM

## 2018-03-01 DIAGNOSIS — I10 ESSENTIAL HYPERTENSION: Chronic | ICD-10-CM

## 2018-03-01 DIAGNOSIS — Z82.49 FAMILY HISTORY OF CARDIOVASCULAR DISEASE: Chronic | ICD-10-CM

## 2018-03-01 DIAGNOSIS — M85.80 OSTEOPENIA, UNSPECIFIED LOCATION: ICD-10-CM

## 2018-03-01 DIAGNOSIS — E78.5 HYPERLIPIDEMIA, UNSPECIFIED HYPERLIPIDEMIA TYPE: Chronic | ICD-10-CM

## 2018-03-01 PROCEDURE — G0439 PPPS, SUBSEQ VISIT: HCPCS | Mod: S$GLB,,, | Performed by: NURSE PRACTITIONER

## 2018-03-01 PROCEDURE — 99499 UNLISTED E&M SERVICE: CPT | Mod: S$GLB,,, | Performed by: NURSE PRACTITIONER

## 2018-03-01 PROCEDURE — 99999 PR PBB SHADOW E&M-EST. PATIENT-LVL IV: CPT | Mod: PBBFAC,,, | Performed by: NURSE PRACTITIONER

## 2018-03-01 RX ORDER — LISINOPRIL 2.5 MG/1
2.5 TABLET ORAL DAILY
COMMUNITY
End: 2018-03-02

## 2018-03-01 RX ORDER — FLUTICASONE PROPIONATE AND SALMETEROL 250; 50 UG/1; UG/1
1 POWDER RESPIRATORY (INHALATION) 2 TIMES DAILY
COMMUNITY
End: 2018-04-23 | Stop reason: ALTCHOICE

## 2018-03-01 NOTE — PATIENT INSTRUCTIONS
Counseling and Referral of Other Preventative  (Italic type indicates deductible and co-insurance are waived)    Patient Name: Cassidy Stanford  Today's Date: 3/1/2018    Health Maintenance       Date Due Completion Date    Lipid Panel 11/28/2018 11/28/2017    High Dose Statin 03/01/2019 3/1/2018    Mammogram 07/28/2019 7/28/2017    DEXA SCAN 05/11/2020 5/11/2017    Colonoscopy 11/10/2021 11/10/2016    Override on 11/10/2011: Done    TETANUS VACCINE 10/05/2025 10/5/2015        No orders of the defined types were placed in this encounter.    The following information is provided to all patients.  This information is to help you find resources for any of the problems found today that may be affecting your health:                Living healthy guide: www.[facility].gov      Understanding Diabetes: www.diabetes.org      Eating healthy: www.cdc.gov/healthyweight      [facility] home safety checklist: www.cdc.gov/steadi/patient.html      Agency on Aging: www.goea.[place].gov      Alcoholics anonymous (AA): www.aa.org      Physical Activity: www.nii.nih.gov/zf5qmbq      Tobacco use: www.quitwithusla.org

## 2018-03-02 ENCOUNTER — PATIENT MESSAGE (OUTPATIENT)
Dept: FAMILY MEDICINE | Facility: CLINIC | Age: 73
End: 2018-03-02

## 2018-03-02 RX ORDER — LOSARTAN POTASSIUM 25 MG/1
25 TABLET ORAL DAILY
Qty: 30 TABLET | Refills: 1 | Status: SHIPPED | OUTPATIENT
Start: 2018-03-02 | End: 2018-04-24

## 2018-03-10 ENCOUNTER — PATIENT MESSAGE (OUTPATIENT)
Dept: FAMILY MEDICINE | Facility: CLINIC | Age: 73
End: 2018-03-10

## 2018-03-12 RX ORDER — MONTELUKAST SODIUM 10 MG/1
10 TABLET ORAL NIGHTLY
Qty: 30 TABLET | Refills: 1 | Status: SHIPPED | OUTPATIENT
Start: 2018-03-12 | End: 2018-05-08 | Stop reason: SDUPTHER

## 2018-03-12 NOTE — PROGRESS NOTES
"Cassidy Stanford presented for a  Medicare AWV and comprehensive Health Risk Assessment today. The following components were reviewed and updated:    · Medical history  · Family History  · Social history  · Allergies and Current Medications  · Health Risk Assessment  · Health Maintenance  · Care Team     ** See Completed Assessments for Annual Wellness Visit within the encounter summary.**       The following assessments were completed:  · Living Situation  · CAGE  · Depression Screening  · Timed Get Up and Go  · Whisper Test  · Cognitive Function Screening          · Nutrition Screening  · ADL Screening  · PAQ Screening    Vitals:    03/01/18 0801   BP: 135/60   BP Location: Left arm   Patient Position: Sitting   BP Method: Medium (Automatic)   Pulse: 74   Weight: 58.2 kg (128 lb 4.9 oz)   Height: 4' 11" (1.499 m)     Body mass index is 25.91 kg/m².  Physical Exam   Constitutional: She is oriented to person, place, and time. She appears well-developed and well-nourished. No distress.   HENT:   Head: Normocephalic and atraumatic.   Neck: Carotid bruit is not present. No thyromegaly present.   Cardiovascular: Normal rate, regular rhythm and normal heart sounds.    No murmur heard.  Pulmonary/Chest: Effort normal and breath sounds normal. No respiratory distress. She has no wheezes.   Neurological: She is alert and oriented to person, place, and time.   Skin: Skin is warm and dry.   Psychiatric: She has a normal mood and affect. Thought content normal.         Diagnoses and health risks identified today and associated recommendations/orders:    1. Encounter for preventive health examination  Reviewed health maintenance and provided recommendations        2. Other emphysema  Stable.   Controlled on current medications.  Followed by pulmonary.      3. Bilateral carotid artery stenosis  Continue to monitor with carotid US  Followed by Alisha.      4. Essential hypertension  Stable.   Controlled on current " medications.  Followed by Alisha.      5. Hyperlipidemia, unspecified hyperlipidemia type  Continue to monitor lipids  Followed by Alisha.      6. Osteopenia, unspecified location  Continue to monitor with dxa scan  Followed by Radha Danielle MD .      7. Family history of cardiovascular disease    Followed by Alisha.        Provided Cassidy with a 5-10 year written screening schedule and personal prevention plan. Recommendations were developed using the USPSTF age appropriate recommendations. Education, counseling, and referrals were provided as needed. After Visit Summary printed and given to patient which includes a list of additional screenings\tests needed.    Follow-up in about 1 year (around 3/1/2019).    Esthela Clayton NP

## 2018-03-19 ENCOUNTER — PATIENT MESSAGE (OUTPATIENT)
Dept: FAMILY MEDICINE | Facility: CLINIC | Age: 73
End: 2018-03-19

## 2018-03-19 RX ORDER — AMLODIPINE BESYLATE 5 MG/1
5 TABLET ORAL DAILY
Qty: 30 TABLET | Refills: 1 | Status: SHIPPED | OUTPATIENT
Start: 2018-03-19 | End: 2018-04-24

## 2018-04-02 ENCOUNTER — PATIENT MESSAGE (OUTPATIENT)
Dept: FAMILY MEDICINE | Facility: CLINIC | Age: 73
End: 2018-04-02

## 2018-04-02 NOTE — TELEPHONE ENCOUNTER
I have read back on her emails and it looks like she has gotten a new cuff. Can she come in for a BP check this week this and bring her cuff? I will make recommendations afterwards.

## 2018-04-04 ENCOUNTER — PATIENT MESSAGE (OUTPATIENT)
Dept: FAMILY MEDICINE | Facility: CLINIC | Age: 73
End: 2018-04-04

## 2018-04-23 ENCOUNTER — OFFICE VISIT (OUTPATIENT)
Dept: FAMILY MEDICINE | Facility: CLINIC | Age: 73
End: 2018-04-23
Payer: MEDICARE

## 2018-04-23 VITALS
BODY MASS INDEX: 25.78 KG/M2 | DIASTOLIC BLOOD PRESSURE: 70 MMHG | SYSTOLIC BLOOD PRESSURE: 130 MMHG | HEART RATE: 60 BPM | WEIGHT: 127.88 LBS | RESPIRATION RATE: 18 BRPM | HEIGHT: 59 IN | TEMPERATURE: 98 F

## 2018-04-23 DIAGNOSIS — I10 ESSENTIAL HYPERTENSION: Primary | ICD-10-CM

## 2018-04-23 DIAGNOSIS — J44.9 CHRONIC OBSTRUCTIVE PULMONARY DISEASE, UNSPECIFIED COPD TYPE: ICD-10-CM

## 2018-04-23 PROCEDURE — 3075F SYST BP GE 130 - 139MM HG: CPT | Mod: CPTII,S$GLB,, | Performed by: FAMILY MEDICINE

## 2018-04-23 PROCEDURE — 3078F DIAST BP <80 MM HG: CPT | Mod: CPTII,S$GLB,, | Performed by: FAMILY MEDICINE

## 2018-04-23 PROCEDURE — 99499 UNLISTED E&M SERVICE: CPT | Mod: S$GLB,,, | Performed by: FAMILY MEDICINE

## 2018-04-23 PROCEDURE — 99214 OFFICE O/P EST MOD 30 MIN: CPT | Mod: S$GLB,,, | Performed by: FAMILY MEDICINE

## 2018-04-23 RX ORDER — FLUTICASONE FUROATE AND VILANTEROL 100; 25 UG/1; UG/1
1 POWDER RESPIRATORY (INHALATION) DAILY
COMMUNITY
End: 2020-02-26

## 2018-04-23 RX ORDER — CARVEDILOL 6.25 MG/1
6.25 TABLET ORAL 2 TIMES DAILY WITH MEALS
Qty: 60 TABLET | Refills: 1 | Status: SHIPPED | OUTPATIENT
Start: 2018-04-23 | End: 2018-05-11

## 2018-04-24 ENCOUNTER — OFFICE VISIT (OUTPATIENT)
Dept: URGENT CARE | Facility: CLINIC | Age: 73
End: 2018-04-24
Payer: MEDICARE

## 2018-04-24 VITALS
OXYGEN SATURATION: 97 % | TEMPERATURE: 98 F | HEART RATE: 60 BPM | BODY MASS INDEX: 25.6 KG/M2 | SYSTOLIC BLOOD PRESSURE: 176 MMHG | DIASTOLIC BLOOD PRESSURE: 80 MMHG | WEIGHT: 127 LBS | HEIGHT: 59 IN

## 2018-04-24 DIAGNOSIS — S42.491A CLOSED BICONDYLAR FRACTURE OF DISTAL END OF RIGHT HUMERUS, INITIAL ENCOUNTER: Primary | ICD-10-CM

## 2018-04-24 DIAGNOSIS — R52 PAIN: ICD-10-CM

## 2018-04-24 PROCEDURE — 99214 OFFICE O/P EST MOD 30 MIN: CPT | Mod: S$GLB,,, | Performed by: PHYSICIAN ASSISTANT

## 2018-04-24 PROCEDURE — 3079F DIAST BP 80-89 MM HG: CPT | Mod: CPTII,S$GLB,, | Performed by: PHYSICIAN ASSISTANT

## 2018-04-24 PROCEDURE — 3077F SYST BP >= 140 MM HG: CPT | Mod: CPTII,S$GLB,, | Performed by: PHYSICIAN ASSISTANT

## 2018-04-24 RX ORDER — HYDROCODONE BITARTRATE AND ACETAMINOPHEN 10; 325 MG/1; MG/1
1 TABLET ORAL EVERY 4 HOURS PRN
Qty: 20 TABLET | Refills: 0 | Status: SHIPPED | OUTPATIENT
Start: 2018-04-24 | End: 2018-07-23

## 2018-04-24 NOTE — PROGRESS NOTES
Subjective:       Patient ID: Cassidy Stanford is a 72 y.o. female.    Chief Complaint: Hypertension (Has been elevated )    HPI   The patient is a 72-year-old who is here today to discuss her blood pressure.  She is concerned about her blood pressure numbers and her medication.  Previously, we were worried that the lisinopril was causing or contributing to the cough she was experiencing.  She tried losartan but it made her too sleepy and tired.  At her last visit, we started her on Norvasc but it did not control her blood pressure well.  She has recently gone back to the lisinopril.  Her blood pressure numbers are better with the lisinopril then they have been with the Norvasc.  She still does have a cough but believes her cough is more related to her COPD than to the lisinopril.    Regarding her COPD, she had been taking Advair but was switched to Breo.  She is now taking her Breo regularly.  She was supposed to be taking Incruse with her Breo but has not been taking the Incruse.  She still does a lot of coughing and wheezing when she first wakes up in the morning and when she does her exercises in the morning but then her cough and wheezing improve.  She feels that her coughing and wheezing is better than it's been previously.  She does note that her son who is in DO school did an adjustment on her when she saw him recently and that seemed to really help her breathing        Review of Systems   Constitutional: Negative for appetite change, chills, diaphoresis, fatigue, fever and unexpected weight change.   HENT: Negative for congestion, ear pain, postnasal drip, rhinorrhea, sinus pressure, sneezing, sore throat and trouble swallowing.    Eyes: Negative for pain, discharge and visual disturbance.   Respiratory: Positive for cough and wheezing. Negative for chest tightness and shortness of breath.    Cardiovascular: Negative for chest pain, palpitations and leg swelling.   Gastrointestinal: Negative for abdominal  distention, abdominal pain, blood in stool, constipation, diarrhea, nausea and vomiting.   Skin: Negative for rash.       Objective:      Physical Exam   Constitutional: She is oriented to person, place, and time. She appears well-developed and well-nourished. No distress.   HENT:   Head: Normocephalic and atraumatic.   Right Ear: Hearing, tympanic membrane, external ear and ear canal normal.   Left Ear: Hearing, tympanic membrane, external ear and ear canal normal.   Nose: Nose normal.   Mouth/Throat: Oropharynx is clear and moist and mucous membranes are normal. No oral lesions. No oropharyngeal exudate, posterior oropharyngeal edema or posterior oropharyngeal erythema.   Eyes: Conjunctivae, EOM and lids are normal. Pupils are equal, round, and reactive to light. No scleral icterus.   Neck: Normal range of motion. Neck supple. Carotid bruit is not present. No thyroid mass and no thyromegaly present.   Cardiovascular: Normal rate, regular rhythm and normal heart sounds.   No extrasystoles are present. PMI is not displaced.  Exam reveals no gallop.    No murmur heard.  Pulmonary/Chest: Effort normal and breath sounds normal. No accessory muscle usage. No respiratory distress.   Clear to auscultation bilaterally.   Abdominal: Soft. Normal appearance and bowel sounds are normal. She exhibits no abdominal bruit. There is no hepatosplenomegaly. There is no tenderness. There is no rebound.   Lymphadenopathy:        Head (right side): No submental and no submandibular adenopathy present.        Head (left side): No submental and no submandibular adenopathy present.        Right cervical: No superficial cervical, no deep cervical and no posterior cervical adenopathy present.       Left cervical: No superficial cervical, no deep cervical and no posterior cervical adenopathy present.        Right: No supraclavicular adenopathy present.        Left: No supraclavicular adenopathy present.   Neurological: She is alert and  "oriented to person, place, and time.   Skin: Skin is warm, dry and intact.   Psychiatric: She has a normal mood and affect.     Blood pressure 130/70, pulse 60, temperature 98.1 °F (36.7 °C), temperature source Oral, resp. rate 18, height 4' 11" (1.499 m), weight 58 kg (127 lb 13.9 oz).Body mass index is 25.83 kg/m².          A/P:  1)  hypertension.  Uncontrolled with intolerance to recent medication.  We are going to start her on Coreg 6.25 mg twice a day.  She will send me an update with her blood pressure in 2 weeks.  If she has any trouble tolerating this medication, she will let me know  2)  COPD.  Uncontrolled.  She will continue with the Breo.  She will start using the Incruse regularly.  She will follow-up with the pulmonologist.  I did recommend we avoid ACE inhibitors so that we can monitor her COPD without any concern that her ACE inhibitor might be causing/contributing to her cough  "

## 2018-04-24 NOTE — PROGRESS NOTES
"Subjective:       Patient ID: Cassidy Stanford is a 72 y.o. female.    Vitals:  height is 4' 11" (1.499 m) and weight is 57.6 kg (127 lb). Her temperature is 97.6 °F (36.4 °C). Her blood pressure is 176/80 (abnormal) and her pulse is 60. Her oxygen saturation is 97%.     Chief Complaint: Elbow Pain    Pt fell today onto left elbow, pt unable to extend arm, incident happened around 9:30am today       Elbow Pain   This is a new problem. The current episode started today. The problem occurs constantly. She has tried nothing for the symptoms.     Review of Systems   Musculoskeletal: Positive for joint pain.       Objective:      Physical Exam   Constitutional: She is oriented to person, place, and time. She appears well-developed and well-nourished. She is cooperative.  Non-toxic appearance. She does not appear ill. No distress.   HENT:   Head: Normocephalic and atraumatic. Head is without abrasion, without contusion and without laceration.   Right Ear: Hearing, tympanic membrane, external ear and ear canal normal. No hemotympanum.   Left Ear: Hearing, tympanic membrane, external ear and ear canal normal. No hemotympanum.   Nose: Nose normal. No mucosal edema, rhinorrhea or nasal deformity. No epistaxis. Right sinus exhibits no maxillary sinus tenderness and no frontal sinus tenderness. Left sinus exhibits no maxillary sinus tenderness and no frontal sinus tenderness.   Mouth/Throat: Uvula is midline, oropharynx is clear and moist and mucous membranes are normal. No trismus in the jaw. Normal dentition. No uvula swelling. No posterior oropharyngeal erythema.   Eyes: Conjunctivae, EOM and lids are normal. Pupils are equal, round, and reactive to light. Right eye exhibits no discharge. Left eye exhibits no discharge. No scleral icterus.   Sclera clear bilat   Neck: Trachea normal, normal range of motion, full passive range of motion without pain and phonation normal. Neck supple. No spinous process tenderness and no " muscular tenderness present. No neck rigidity. No tracheal deviation present.   Cardiovascular: Normal rate, regular rhythm, normal heart sounds, intact distal pulses and normal pulses.    Pulmonary/Chest: Effort normal and breath sounds normal. No respiratory distress.   Abdominal: Soft. Normal appearance. She exhibits no distension, no pulsatile midline mass and no mass. There is no tenderness.   Musculoskeletal: She exhibits no edema.        Right elbow: She exhibits decreased range of motion, swelling and deformity. She exhibits no effusion and no laceration. Tenderness found. Radial head, medial epicondyle, lateral epicondyle and olecranon process tenderness noted.   Neurological: She is alert and oriented to person, place, and time. She has normal strength. No cranial nerve deficit or sensory deficit. She exhibits normal muscle tone. She displays no seizure activity. Coordination normal. GCS eye subscore is 4. GCS verbal subscore is 5. GCS motor subscore is 6.   Skin: Skin is warm, dry and intact. Capillary refill takes less than 2 seconds. No abrasion, no bruising, no burn, no ecchymosis and no laceration noted. She is not diaphoretic. No pallor.   Psychiatric: She has a normal mood and affect. Her speech is normal and behavior is normal. Judgment and thought content normal. Cognition and memory are normal.   Nursing note and vitals reviewed.      Assessment:       1. Closed bicondylar fracture of distal end of right humerus, initial encounter    2. Pain        Plan:         Closed bicondylar fracture of distal end of right humerus, initial encounter  -     Ambulatory referral to Orthopedics    Pain  -     X-Ray Elbow Complete Right; Future; Expected date: 04/24/2018      Urgent ortho referral . Posterior splint sling. Pain management. If you were prescribed a narcotic medication, do not drive or operate heavy equipment or machinery while taking these medications.  You must understand that you've received an  Urgent Care treatment only and that you may be released before all your medical problems are known or treated. You, the patient, will arrange for follow up care as instructed.  Follow up with your PCP or specialty clinic as directed in the next 1-2 weeks if not improved or as needed.  You can call [phone] to schedule an appointment with the appropriate provider.  If your condition worsens we recommend that you receive another evaluation at the emergency room immediately or contact your primary medical clinics after hours call service to discuss your concerns.  Please return here or go to the Emergency Department for any concerns or worsening of condition.

## 2018-04-24 NOTE — PATIENT INSTRUCTIONS
Elbow Fracture    You have a break (fracture) of one or more bones of your elbow joint. This may be a small crack in the bone. Or it may be a major break, with the broken parts pushed out of position.  This fracture usually takes 4 to 12 weeks to heal, depending on the type. The first step in treatment is with a splint or cast. Severe fractures may need surgery to put the bone fragments back into place.  Home care  Follow these guidelines when caring for yourself at home:  · Keep your arm elevated to reduce pain and swelling. When sitting or lying down keep your arm above the level of your heart. You can do this by placing your arm on a pillow that rests on your chest or on a pillow at your side. This is most important during the first 2 days (48 hours) after the injury.  · Put an ice pack on the injured area. Do this for 20 minutes every 1 to 2 hours the first day. You can make an ice pack by wrapping a plastic bag of ice cubes in a thin towel. As the ice melts, be careful that the cast or splint doesnt get wet. You can place the ice pack inside the sling and directly over the splint or cast. Continue to use the ice pack 3 to 4 times a day for the next 2 days. Then use the ice pack as needed to ease pain and swelling.  · Keep the splint or cast completely dry at all times. Bathe with your splint or cast out of the water. Protect it with a large plastic bag, rubber-banded at the top end. If a fiberglass splint or cast gets wet, you can dry it with a hair dryer.  · You may use acetaminophen or ibuprofen to control pain, unless another pain medicine was prescribed. If you have chronic liver or kidney disease, talk with your healthcare provider before using these medicines. Also talk with your provider if youve had a stomach ulcer or gastrointestinal bleeding.  · Dont put creams or objects under the cast if you have itching.  Follow-up care  Follow up with your healthcare provider in 1 week, or as advised. This is  to make sure the bone is healing the way it should. If a splint was put on, it may be changed to a cast during your follow-up visit.  X-rays may be taken. You will be told of any new findings that may affect your care.  When to seek medical advice  Call your healthcare provider right away if any of these occur:  · The cast or splint cracks  · The plaster cast or splint becomes wet or soft  · The fiberglass cast or splint stays wet for more than 24 hours  · Tightness or pain under the cast or splint gets worse  · Bad odor from the cast or wound fluid stains the cast  · Fingers become swollen, cold, blue, numb, or tingly  · You cant move your fingers  · Skin around cast becomes red  · Fever of 100.4ºF (38ºC) or higher, or as directed by your healthcare provider   Date Last Reviewed: 2/6/2017  © 7322-6445 [employer]. [address] [place] [address], [place] [address]. All rights reserved. This information is not intended as a substitute for professional medical care. Always follow your healthcare professional's instructions.

## 2018-05-08 RX ORDER — MONTELUKAST SODIUM 10 MG/1
TABLET ORAL
Qty: 90 TABLET | Refills: 3 | Status: SHIPPED | OUTPATIENT
Start: 2018-05-08 | End: 2018-11-29

## 2018-05-10 ENCOUNTER — PATIENT MESSAGE (OUTPATIENT)
Dept: FAMILY MEDICINE | Facility: CLINIC | Age: 73
End: 2018-05-10

## 2018-05-11 RX ORDER — CARVEDILOL 12.5 MG/1
12.5 TABLET ORAL 2 TIMES DAILY WITH MEALS
Qty: 180 TABLET | Refills: 0 | Status: SHIPPED | OUTPATIENT
Start: 2018-05-11 | End: 2018-07-23

## 2018-05-11 RX ORDER — CARVEDILOL 6.25 MG/1
6.25 TABLET ORAL 2 TIMES DAILY WITH MEALS
Qty: 60 TABLET | Refills: 1 | Status: CANCELLED | OUTPATIENT
Start: 2018-05-11 | End: 2019-05-11

## 2018-05-31 ENCOUNTER — PATIENT MESSAGE (OUTPATIENT)
Dept: FAMILY MEDICINE | Facility: CLINIC | Age: 73
End: 2018-05-31

## 2018-06-02 RX ORDER — AMLODIPINE BESYLATE 5 MG/1
5 TABLET ORAL DAILY
Qty: 30 TABLET | Refills: 1 | Status: SHIPPED | OUTPATIENT
Start: 2018-06-02 | End: 2018-07-19

## 2018-06-15 ENCOUNTER — PATIENT MESSAGE (OUTPATIENT)
Dept: FAMILY MEDICINE | Facility: CLINIC | Age: 73
End: 2018-06-15

## 2018-06-29 ENCOUNTER — PATIENT MESSAGE (OUTPATIENT)
Dept: ADMINISTRATIVE | Facility: OTHER | Age: 73
End: 2018-06-29

## 2018-07-17 ENCOUNTER — PATIENT MESSAGE (OUTPATIENT)
Dept: FAMILY MEDICINE | Facility: CLINIC | Age: 73
End: 2018-07-17

## 2018-07-19 RX ORDER — AMLODIPINE BESYLATE 10 MG/1
10 TABLET ORAL DAILY
Qty: 30 TABLET | Refills: 1 | Status: SHIPPED | OUTPATIENT
Start: 2018-07-19 | End: 2018-08-03 | Stop reason: SDUPTHER

## 2018-07-23 ENCOUNTER — OFFICE VISIT (OUTPATIENT)
Dept: CARDIOLOGY | Facility: CLINIC | Age: 73
End: 2018-07-23
Payer: MEDICARE

## 2018-07-23 VITALS
HEIGHT: 59 IN | HEART RATE: 59 BPM | DIASTOLIC BLOOD PRESSURE: 69 MMHG | BODY MASS INDEX: 25.69 KG/M2 | SYSTOLIC BLOOD PRESSURE: 124 MMHG | WEIGHT: 127.44 LBS

## 2018-07-23 DIAGNOSIS — E83.118 OTHER HEMOCHROMATOSIS: ICD-10-CM

## 2018-07-23 DIAGNOSIS — I65.23 BILATERAL CAROTID ARTERY STENOSIS: Primary | ICD-10-CM

## 2018-07-23 DIAGNOSIS — E78.5 DYSLIPIDEMIA: ICD-10-CM

## 2018-07-23 DIAGNOSIS — Z82.49 FAMILY HISTORY OF CARDIOVASCULAR DISEASE: Chronic | ICD-10-CM

## 2018-07-23 DIAGNOSIS — I10 ESSENTIAL HYPERTENSION: Chronic | ICD-10-CM

## 2018-07-23 PROCEDURE — 3074F SYST BP LT 130 MM HG: CPT | Mod: CPTII,S$GLB,, | Performed by: INTERNAL MEDICINE

## 2018-07-23 PROCEDURE — 99999 PR PBB SHADOW E&M-EST. PATIENT-LVL III: CPT | Mod: PBBFAC,,, | Performed by: INTERNAL MEDICINE

## 2018-07-23 PROCEDURE — 99214 OFFICE O/P EST MOD 30 MIN: CPT | Mod: S$GLB,,, | Performed by: INTERNAL MEDICINE

## 2018-07-23 PROCEDURE — 3078F DIAST BP <80 MM HG: CPT | Mod: CPTII,S$GLB,, | Performed by: INTERNAL MEDICINE

## 2018-07-23 NOTE — PROGRESS NOTES
Subjective:    Patient ID:  Cassidy Stanford is a 72 y.o. female who presents for follow-up of Hypertension (Annual with kimani)      HPI  Here for follow up of HTN/fam hx CAD/carotid dsx. Having more asthma lately. BB dc due to asthma. No CP.     Review of Systems   Constitution: Negative for malaise/fatigue.   Eyes: Negative for blurred vision.   Cardiovascular: Negative for chest pain, claudication, cyanosis, dyspnea on exertion, irregular heartbeat, leg swelling, near-syncope, orthopnea, palpitations, paroxysmal nocturnal dyspnea and syncope.   Respiratory: Negative for cough and shortness of breath.    Hematologic/Lymphatic: Does not bruise/bleed easily.   Musculoskeletal: Negative for back pain, falls, joint pain, muscle cramps, muscle weakness and myalgias.   Gastrointestinal: Negative for abdominal pain, change in bowel habit, nausea and vomiting.   Genitourinary: Negative for urgency.   Neurological: Negative for dizziness, focal weakness and light-headedness.        Objective:    Physical Exam   Constitutional: She is oriented to person, place, and time. She appears well-developed and well-nourished.   HENT:   Head: Normocephalic.   Eyes: Conjunctivae are normal.   Neck: Normal range of motion. Neck supple. No JVD present.   Cardiovascular: Normal rate, regular rhythm, normal heart sounds and intact distal pulses.    Pulses:       Carotid pulses are 2+ on the right side, and 2+ on the left side.       Radial pulses are 2+ on the right side, and 2+ on the left side.        Dorsalis pedis pulses are 2+ on the right side, and 2+ on the left side.        Posterior tibial pulses are 2+ on the right side, and 2+ on the left side.   Pulmonary/Chest: Effort normal and breath sounds normal.   Abdominal: Soft. Bowel sounds are normal.   Musculoskeletal: She exhibits no edema or tenderness.   Neurological: She is alert and oriented to person, place, and time. Gait normal.   Skin: Skin is warm, dry and intact. No  cyanosis. Nails show no clubbing.   Psychiatric: She has a normal mood and affect. Her speech is normal and behavior is normal. Thought content normal.             ..    Chemistry        Component Value Date/Time     11/28/2017 0802    K 4.1 11/28/2017 0802     11/28/2017 0802    CO2 26 11/28/2017 0802    BUN 14 11/28/2017 0802    CREATININE 0.8 11/28/2017 0802    GLU 90 11/28/2017 0802        Component Value Date/Time    CALCIUM 10.0 11/28/2017 0802    ALKPHOS 91 11/28/2017 0802    AST 24 11/28/2017 0802    ALT 19 11/28/2017 0802    BILITOT 0.4 11/28/2017 0802    ESTGFRAFRICA >60.0 11/28/2017 0802    EGFRNONAA >60.0 11/28/2017 0802            ..  Lab Results   Component Value Date    CHOL 195 11/28/2017    CHOL 206 (H) 05/11/2017    CHOL 128 11/01/2016     Lab Results   Component Value Date    HDL 65 11/28/2017    HDL 71 05/11/2017    HDL 46 11/01/2016     Lab Results   Component Value Date    LDLCALC 117.6 11/28/2017    LDLCALC 125.8 05/11/2017    LDLCALC 68.8 11/01/2016     Lab Results   Component Value Date    TRIG 62 11/28/2017    TRIG 46 05/11/2017    TRIG 66 11/01/2016     Lab Results   Component Value Date    CHOLHDL 33.3 11/28/2017    CHOLHDL 34.5 05/11/2017    CHOLHDL 35.9 11/01/2016     ..  Lab Results   Component Value Date    WBC 5.83 11/28/2017    HGB 12.9 11/28/2017    HCT 39.4 11/28/2017    MCV 93 11/28/2017     11/28/2017       Test(s) Reviewed  I have reviewed the following in detail:  [] Stress test   [] Angiography   [x] Echocardiogram   [x] Labs   [] Other:       Assessment:         ICD-10-CM ICD-9-CM   1. Bilateral carotid artery stenosis I65.23 433.10     433.30   2. Dyslipidemia E78.5 272.4   3. Essential hypertension I10 401.9   4. Family history of cardiovascular disease Z82.49 V17.49     Problem List Items Addressed This Visit     Carotid artery stenosis - Primary    Overview     There is 40 - 49% right Internal Carotid stenosis.  There is 20 - 39% left Internal Carotid  stenosis.  09/09/2014          Dyslipidemia    Essential hypertension (Chronic)    Family history of cardiovascular disease (Chronic)           Plan:           Return to clinic  PRN  Low level/low impact aerobic exercise 5x's/wk. Heart healthy diet and risk factor modification.    See labs and med orders.  Labs in sept f/u PCP

## 2018-08-03 ENCOUNTER — PATIENT MESSAGE (OUTPATIENT)
Dept: FAMILY MEDICINE | Facility: CLINIC | Age: 73
End: 2018-08-03

## 2018-08-03 RX ORDER — AMLODIPINE BESYLATE 10 MG/1
10 TABLET ORAL DAILY
Qty: 90 TABLET | Refills: 1 | Status: SHIPPED | OUTPATIENT
Start: 2018-08-03 | End: 2018-11-08 | Stop reason: SDUPTHER

## 2018-08-20 ENCOUNTER — PATIENT MESSAGE (OUTPATIENT)
Dept: FAMILY MEDICINE | Facility: CLINIC | Age: 73
End: 2018-08-20

## 2018-08-20 ENCOUNTER — TELEPHONE (OUTPATIENT)
Dept: FAMILY MEDICINE | Facility: CLINIC | Age: 73
End: 2018-08-20

## 2018-08-20 ENCOUNTER — HOSPITAL ENCOUNTER (OUTPATIENT)
Dept: RADIOLOGY | Facility: HOSPITAL | Age: 73
Discharge: HOME | End: 2018-08-20
Attending: FAMILY MEDICINE
Payer: MEDICARE

## 2018-08-20 DIAGNOSIS — Z12.39 ENCOUNTER FOR SCREENING FOR MALIGNANT NEOPLASM OF BREAST: Primary | ICD-10-CM

## 2018-08-20 DIAGNOSIS — Z12.39 ENCOUNTER FOR SCREENING FOR MALIGNANT NEOPLASM OF BREAST: ICD-10-CM

## 2018-08-20 PROCEDURE — 77063 BREAST TOMOSYNTHESIS BI: CPT | Mod: 26,,, | Performed by: RADIOLOGY

## 2018-08-20 PROCEDURE — 77067 SCR MAMMO BI INCL CAD: CPT | Mod: TC,PO

## 2018-08-20 PROCEDURE — 77067 SCR MAMMO BI INCL CAD: CPT | Mod: 26,,, | Performed by: RADIOLOGY

## 2018-08-20 NOTE — TELEPHONE ENCOUNTER
----- Message from Edwin Hollis sent at 8/20/2018  8:28 AM CDT -----  Contact: Patient  Cassidy, [phone]. Calling to schedule mammogram. Needs orders in system. Would like to come today or tomorrow, so she would need the orders as soon as possible. Please advise. Thanks.

## 2018-08-26 ENCOUNTER — PATIENT MESSAGE (OUTPATIENT)
Dept: FAMILY MEDICINE | Facility: CLINIC | Age: 73
End: 2018-08-26

## 2018-11-08 RX ORDER — AMLODIPINE BESYLATE 10 MG/1
TABLET ORAL
Qty: 90 TABLET | Refills: 0 | Status: SHIPPED | OUTPATIENT
Start: 2018-11-08 | End: 2018-11-29

## 2018-11-26 ENCOUNTER — PATIENT MESSAGE (OUTPATIENT)
Dept: FAMILY MEDICINE | Facility: CLINIC | Age: 73
End: 2018-11-26

## 2018-11-26 DIAGNOSIS — E03.4 HYPOTHYROIDISM DUE TO ACQUIRED ATROPHY OF THYROID: Primary | ICD-10-CM

## 2018-11-28 ENCOUNTER — LAB VISIT (OUTPATIENT)
Dept: LAB | Facility: HOSPITAL | Age: 73
End: 2018-11-28
Attending: INTERNAL MEDICINE
Payer: MEDICARE

## 2018-11-28 ENCOUNTER — PATIENT MESSAGE (OUTPATIENT)
Dept: FAMILY MEDICINE | Facility: CLINIC | Age: 73
End: 2018-11-28

## 2018-11-28 DIAGNOSIS — E83.118 OTHER HEMOCHROMATOSIS: ICD-10-CM

## 2018-11-28 DIAGNOSIS — I10 ESSENTIAL HYPERTENSION: Chronic | ICD-10-CM

## 2018-11-28 DIAGNOSIS — E03.4 HYPOTHYROIDISM DUE TO ACQUIRED ATROPHY OF THYROID: ICD-10-CM

## 2018-11-28 DIAGNOSIS — E78.5 DYSLIPIDEMIA: ICD-10-CM

## 2018-11-28 DIAGNOSIS — I65.23 BILATERAL CAROTID ARTERY STENOSIS: ICD-10-CM

## 2018-11-28 LAB
ALBUMIN SERPL BCP-MCNC: 4 G/DL
ALP SERPL-CCNC: 84 U/L
ALT SERPL W/O P-5'-P-CCNC: 21 U/L
ANION GAP SERPL CALC-SCNC: 7 MMOL/L
AST SERPL-CCNC: 26 U/L
BASOPHILS # BLD AUTO: 0.06 K/UL
BASOPHILS NFR BLD: 1.1 %
BILIRUB SERPL-MCNC: 0.5 MG/DL
BUN SERPL-MCNC: 11 MG/DL
CALCIUM SERPL-MCNC: 10 MG/DL
CHLORIDE SERPL-SCNC: 104 MMOL/L
CHOLEST SERPL-MCNC: 171 MG/DL
CHOLEST/HDLC SERPL: 2.4 {RATIO}
CO2 SERPL-SCNC: 28 MMOL/L
CREAT SERPL-MCNC: 0.8 MG/DL
DIFFERENTIAL METHOD: ABNORMAL
EOSINOPHIL # BLD AUTO: 0.3 K/UL
EOSINOPHIL NFR BLD: 4.8 %
ERYTHROCYTE [DISTWIDTH] IN BLOOD BY AUTOMATED COUNT: 12.7 %
EST. GFR  (AFRICAN AMERICAN): >60 ML/MIN/1.73 M^2
EST. GFR  (NON AFRICAN AMERICAN): >60 ML/MIN/1.73 M^2
ESTIMATED AVG GLUCOSE: 108 MG/DL
GLUCOSE SERPL-MCNC: 92 MG/DL
HBA1C MFR BLD HPLC: 5.4 %
HCT VFR BLD AUTO: 38.9 %
HDLC SERPL-MCNC: 72 MG/DL
HDLC SERPL: 42.1 %
HGB BLD-MCNC: 12.5 G/DL
IMM GRANULOCYTES # BLD AUTO: 0.02 K/UL
IMM GRANULOCYTES NFR BLD AUTO: 0.4 %
LDLC SERPL CALC-MCNC: 88 MG/DL
LYMPHOCYTES # BLD AUTO: 1.1 K/UL
LYMPHOCYTES NFR BLD: 20.4 %
MCH RBC QN AUTO: 30 PG
MCHC RBC AUTO-ENTMCNC: 32.1 G/DL
MCV RBC AUTO: 93 FL
MONOCYTES # BLD AUTO: 0.4 K/UL
MONOCYTES NFR BLD: 8.4 %
NEUTROPHILS # BLD AUTO: 3.4 K/UL
NEUTROPHILS NFR BLD: 64.9 %
NONHDLC SERPL-MCNC: 99 MG/DL
NRBC BLD-RTO: 0 /100 WBC
PLATELET # BLD AUTO: 257 K/UL
PMV BLD AUTO: 8.8 FL
POTASSIUM SERPL-SCNC: 3.6 MMOL/L
PROT SERPL-MCNC: 7.5 G/DL
RBC # BLD AUTO: 4.17 M/UL
SODIUM SERPL-SCNC: 139 MMOL/L
TRIGL SERPL-MCNC: 55 MG/DL
TSH SERPL DL<=0.005 MIU/L-ACNC: 3.57 UIU/ML
WBC # BLD AUTO: 5.25 K/UL

## 2018-11-28 PROCEDURE — 83036 HEMOGLOBIN GLYCOSYLATED A1C: CPT | Mod: HCWC

## 2018-11-28 PROCEDURE — 80053 COMPREHEN METABOLIC PANEL: CPT | Mod: HCWC

## 2018-11-28 PROCEDURE — 85025 COMPLETE CBC W/AUTO DIFF WBC: CPT | Mod: HCWC

## 2018-11-28 PROCEDURE — 36415 COLL VENOUS BLD VENIPUNCTURE: CPT | Mod: HCWC,PO

## 2018-11-28 PROCEDURE — 80061 LIPID PANEL: CPT | Mod: HCWC

## 2018-11-28 PROCEDURE — 84443 ASSAY THYROID STIM HORMONE: CPT | Mod: HCWC

## 2018-11-29 ENCOUNTER — PATIENT MESSAGE (OUTPATIENT)
Dept: ADMINISTRATIVE | Facility: OTHER | Age: 73
End: 2018-11-29

## 2018-11-29 ENCOUNTER — OFFICE VISIT (OUTPATIENT)
Dept: FAMILY MEDICINE | Facility: CLINIC | Age: 73
End: 2018-11-29
Payer: MEDICARE

## 2018-11-29 VITALS
HEIGHT: 59 IN | BODY MASS INDEX: 26.21 KG/M2 | RESPIRATION RATE: 18 BRPM | SYSTOLIC BLOOD PRESSURE: 126 MMHG | WEIGHT: 130 LBS | DIASTOLIC BLOOD PRESSURE: 60 MMHG | TEMPERATURE: 99 F | HEART RATE: 64 BPM

## 2018-11-29 DIAGNOSIS — Z00.00 ANNUAL PHYSICAL EXAM: Primary | ICD-10-CM

## 2018-11-29 DIAGNOSIS — I10 HYPERTENSION, ESSENTIAL: ICD-10-CM

## 2018-11-29 DIAGNOSIS — J44.9 CHRONIC OBSTRUCTIVE PULMONARY DISEASE, UNSPECIFIED COPD TYPE: ICD-10-CM

## 2018-11-29 DIAGNOSIS — I65.23 BILATERAL CAROTID ARTERY STENOSIS: ICD-10-CM

## 2018-11-29 DIAGNOSIS — M85.80 OSTEOPENIA, UNSPECIFIED LOCATION: ICD-10-CM

## 2018-11-29 PROCEDURE — 99397 PER PM REEVAL EST PAT 65+ YR: CPT | Mod: S$GLB,,, | Performed by: FAMILY MEDICINE

## 2018-11-29 PROCEDURE — 3074F SYST BP LT 130 MM HG: CPT | Mod: CPTII,S$GLB,, | Performed by: FAMILY MEDICINE

## 2018-11-29 PROCEDURE — 3078F DIAST BP <80 MM HG: CPT | Mod: CPTII,S$GLB,, | Performed by: FAMILY MEDICINE

## 2018-11-29 RX ORDER — LISINOPRIL 5 MG/1
5 TABLET ORAL DAILY
Qty: 90 TABLET | Refills: 1 | Status: SHIPPED | OUTPATIENT
Start: 2018-11-29 | End: 2019-01-14

## 2018-11-29 NOTE — PROGRESS NOTES
Subjective:       Patient ID: Cassidy Stanford is a 72 y.o. female.    Chief Complaint: Preventative Health Care (Physical with labs prior: Declined [facility]/program)    HPI   The patient is a 72-year-old who is here today for her annual exam.  Overall, she is doing well.  She is continuing to travel to visit her grandchildren a lot.  She recently fell at a friend's house and broke her arm requiring extensive surgery.  This fall has made her a bit more aware of her age.  She recently had fasting labs which were reviewed today.  Her mammogram is up-to-date.  Her colon cancer screening is up-to-date    Today we also discussed the followin)    Hypertension.  She is currently taking Norvasc but does not like it.  She thinks the Norvasc is causing swelling at her ankles.  She previously did well on the lisinopril and wants to go back to lisinopril.  She does not notice that she had any improvement in her chronic cough since stopping lisinopril  2) COPD.  She has moderate to severe COPD from her secondhand tobacco exposure as a child.  She does see her pulmonologist every 6 months.  She is taking Breo and incruse regularly and Singulair occasionally   3) osteopenia.  Given her recent fall and fracture, we did discuss bisphosphonate treatment given her osteopenia noted on her May 2017 DEXA scan.  She does not want to start any medication for bone strength at this time    4) hyperlipidemia.  She is doing well with the Pravachol.  Her recent total cholesterol was  171 and her LDL was 88    Review of Systems   Constitutional: Negative for appetite change, chills, diaphoresis, fatigue, fever and unexpected weight change.   HENT: Negative for congestion, dental problem, ear pain, hearing loss, postnasal drip, rhinorrhea, sneezing, sore throat and trouble swallowing.    Eyes: Negative for photophobia, pain, discharge and visual disturbance.   Respiratory: Positive for cough. Negative for chest tightness,  shortness of breath and wheezing.    Cardiovascular: Negative for chest pain, palpitations and leg swelling.   Gastrointestinal: Negative for abdominal distention, abdominal pain, blood in stool, constipation, diarrhea, nausea and vomiting.   Endocrine: Negative for cold intolerance, heat intolerance, polydipsia and polyuria.   Genitourinary: Negative for dysuria, flank pain, frequency, genital sores, hematuria, menstrual problem and vaginal discharge.   Musculoskeletal: Negative for arthralgias, joint swelling and myalgias.   Skin: Negative for rash.   Neurological: Negative for dizziness, syncope, light-headedness and headaches.   Hematological: Negative for adenopathy. Does not bruise/bleed easily.   Psychiatric/Behavioral: Negative for dysphoric mood, self-injury, sleep disturbance and suicidal ideas. The patient is not nervous/anxious.        Objective:      Physical Exam   Constitutional: She is oriented to person, place, and time. She appears well-developed and well-nourished. No distress.   HENT:   Head: Normocephalic and atraumatic.   Right Ear: Hearing, tympanic membrane, external ear and ear canal normal.   Left Ear: Hearing, tympanic membrane, external ear and ear canal normal.   Nose: Nose normal.   Mouth/Throat: Oropharynx is clear and moist and mucous membranes are normal. No oral lesions. No oropharyngeal exudate, posterior oropharyngeal edema or posterior oropharyngeal erythema.   Eyes: Conjunctivae, EOM and lids are normal. Pupils are equal, round, and reactive to light. No scleral icterus.   Neck: Normal range of motion. Neck supple. Carotid bruit is not present. No thyroid mass and no thyromegaly present.   Cardiovascular: Normal rate, regular rhythm and normal heart sounds.  No extrasystoles are present. PMI is not displaced. Exam reveals no gallop.   No murmur heard.  Pulmonary/Chest: Effort normal and breath sounds normal. No accessory muscle usage. No respiratory distress.   Clear to  "auscultation bilaterally.   Abdominal: Soft. Normal appearance and bowel sounds are normal. She exhibits no abdominal bruit. There is no hepatosplenomegaly. There is no tenderness. There is no rebound.   Lymphadenopathy:        Head (right side): No submental and no submandibular adenopathy present.        Head (left side): No submental and no submandibular adenopathy present.        Right cervical: No superficial cervical, no deep cervical and no posterior cervical adenopathy present.       Left cervical: No superficial cervical, no deep cervical and no posterior cervical adenopathy present.        Right: No supraclavicular adenopathy present.        Left: No supraclavicular adenopathy present.   Neurological: She is alert and oriented to person, place, and time. She has normal strength. No cranial nerve deficit or sensory deficit.   Skin: Skin is warm, dry and intact.   Psychiatric: She has a normal mood and affect. Her speech is normal and behavior is normal. Thought content normal. Cognition and memory are normal.   Breast:  The breasts appear symmetric.  There is no nipple discharge or nipple inversion noted.  There are no masses palpable throughout either breast.  There is no supraclavicular or axillary lymphadenopathy.      Blood pressure 126/60, pulse 64, temperature 98.8 °F (37.1 °C), temperature source Oral, resp. rate 18, height 4' 11" (1.499 m), weight 59 kg (130 lb).Body mass index is 26.26 kg/m².          A/P:  1) annual exam.  Health maintenance issues and anticipatory guidance issues were discussed.  Immunizations are up-to-date.  I will see her every 9 months or sooner if needed  2) hypertension.  Well controlled.  We will stop the Norvasc and change back to lisinopril.  She will send me an update with her blood pressure readings in 2 weeks  3) COPD.  Continue current medication and follow up with pulmonology as planned    4) history of carotid artery stenosis.  Status unknown.  We will repeat an " ultrasound

## 2018-12-07 ENCOUNTER — HOSPITAL ENCOUNTER (OUTPATIENT)
Dept: RADIOLOGY | Facility: HOSPITAL | Age: 73
Discharge: HOME | End: 2018-12-07
Attending: FAMILY MEDICINE
Payer: MEDICARE

## 2018-12-07 ENCOUNTER — PATIENT MESSAGE (OUTPATIENT)
Dept: FAMILY MEDICINE | Facility: CLINIC | Age: 73
End: 2018-12-07

## 2018-12-07 DIAGNOSIS — I65.23 BILATERAL CAROTID ARTERY STENOSIS: ICD-10-CM

## 2018-12-07 PROCEDURE — 93880 EXTRACRANIAL BILAT STUDY: CPT | Mod: TC,HCWC,PO

## 2018-12-07 PROCEDURE — 93880 EXTRACRANIAL BILAT STUDY: CPT | Mod: 26,HCWC,, | Performed by: RADIOLOGY

## 2018-12-10 ENCOUNTER — PATIENT MESSAGE (OUTPATIENT)
Dept: FAMILY MEDICINE | Facility: CLINIC | Age: 73
End: 2018-12-10

## 2019-01-14 ENCOUNTER — PATIENT MESSAGE (OUTPATIENT)
Dept: FAMILY MEDICINE | Facility: CLINIC | Age: 74
End: 2019-01-14

## 2019-01-14 ENCOUNTER — TELEPHONE (OUTPATIENT)
Dept: FAMILY MEDICINE | Facility: CLINIC | Age: 74
End: 2019-01-14

## 2019-01-14 RX ORDER — OLMESARTAN MEDOXOMIL 20 MG/1
20 TABLET ORAL DAILY
COMMUNITY
End: 2019-01-15

## 2019-01-14 RX ORDER — OLMESARTAN MEDOXOMIL 20 MG/1
20 TABLET ORAL DAILY
Qty: 90 TABLET | Refills: 0 | Status: CANCELLED | OUTPATIENT
Start: 2019-01-14

## 2019-01-15 RX ORDER — OLMESARTAN MEDOXOMIL 5 MG/1
5 TABLET ORAL DAILY
Qty: 90 TABLET | Refills: 0 | Status: SHIPPED | OUTPATIENT
Start: 2019-01-15 | End: 2019-04-13 | Stop reason: SDUPTHER

## 2019-01-31 ENCOUNTER — OFFICE VISIT (OUTPATIENT)
Dept: FAMILY MEDICINE | Facility: CLINIC | Age: 74
End: 2019-01-31
Payer: MEDICARE

## 2019-01-31 VITALS
HEART RATE: 68 BPM | OXYGEN SATURATION: 98 % | DIASTOLIC BLOOD PRESSURE: 56 MMHG | WEIGHT: 130.75 LBS | SYSTOLIC BLOOD PRESSURE: 121 MMHG | BODY MASS INDEX: 26.36 KG/M2 | HEIGHT: 59 IN

## 2019-01-31 DIAGNOSIS — E78.5 DYSLIPIDEMIA: ICD-10-CM

## 2019-01-31 DIAGNOSIS — I10 ESSENTIAL HYPERTENSION: Chronic | ICD-10-CM

## 2019-01-31 DIAGNOSIS — J44.9 CHRONIC OBSTRUCTIVE PULMONARY DISEASE, UNSPECIFIED COPD TYPE: ICD-10-CM

## 2019-01-31 DIAGNOSIS — I65.23 BILATERAL CAROTID ARTERY STENOSIS: ICD-10-CM

## 2019-01-31 DIAGNOSIS — Z00.00 ENCOUNTER FOR PREVENTIVE HEALTH EXAMINATION: Primary | ICD-10-CM

## 2019-01-31 DIAGNOSIS — M85.80 OSTEOPENIA, UNSPECIFIED LOCATION: ICD-10-CM

## 2019-01-31 PROCEDURE — 99999 PR PBB SHADOW E&M-EST. PATIENT-LVL V: ICD-10-PCS | Mod: PBBFAC,HCNC,, | Performed by: NURSE PRACTITIONER

## 2019-01-31 PROCEDURE — 3074F PR MOST RECENT SYSTOLIC BLOOD PRESSURE < 130 MM HG: ICD-10-PCS | Mod: HCNC,CPTII,S$GLB, | Performed by: NURSE PRACTITIONER

## 2019-01-31 PROCEDURE — 99999 PR PBB SHADOW E&M-EST. PATIENT-LVL V: CPT | Mod: PBBFAC,HCNC,, | Performed by: NURSE PRACTITIONER

## 2019-01-31 PROCEDURE — 3078F PR MOST RECENT DIASTOLIC BLOOD PRESSURE < 80 MM HG: ICD-10-PCS | Mod: HCNC,CPTII,S$GLB, | Performed by: NURSE PRACTITIONER

## 2019-01-31 PROCEDURE — G0439 PPPS, SUBSEQ VISIT: HCPCS | Mod: HCNC,S$GLB,, | Performed by: NURSE PRACTITIONER

## 2019-01-31 PROCEDURE — 3074F SYST BP LT 130 MM HG: CPT | Mod: HCNC,CPTII,S$GLB, | Performed by: NURSE PRACTITIONER

## 2019-01-31 PROCEDURE — G0439 PR MEDICARE ANNUAL WELLNESS SUBSEQUENT VISIT: ICD-10-PCS | Mod: HCNC,S$GLB,, | Performed by: NURSE PRACTITIONER

## 2019-01-31 PROCEDURE — 3078F DIAST BP <80 MM HG: CPT | Mod: HCNC,CPTII,S$GLB, | Performed by: NURSE PRACTITIONER

## 2019-01-31 NOTE — PATIENT INSTRUCTIONS
Counseling and Referral of Other Preventative  (Italic type indicates deductible and co-insurance are waived)    Patient Name: Cassidy Stanford  Today's Date: 1/31/2019    Health Maintenance       Date Due Completion Date    Mammogram 08/20/2019 8/20/2018    Lipid Panel 11/28/2019 11/28/2018    High Dose Statin 01/31/2020 1/31/2019    Aspirin/Antiplatelet Therapy 01/31/2020 1/31/2019    DEXA SCAN 05/11/2020 5/11/2017    Colonoscopy 11/10/2021 11/10/2016    Override on 11/10/2011: Done    TETANUS VACCINE 10/05/2025 10/5/2015        No orders of the defined types were placed in this encounter.    The following information is provided to all patients.  This information is to help you find resources for any of the problems found today that may be affecting your health:                Living healthy guide: www.[facility].[place].gov      Understanding Diabetes: www.diabetes.org      Eating healthy: www.cdc.gov/healthyweight      CDC home safety checklist: www.cdc.gov/steadi/patient.html      Agency on Aging: www.goea.[place].[facility]      Alcoholics anonymous (AA): www.aa.org      Physical Activity: www.nii.nih.gov/wd6nrpe      Tobacco use: www.quitwithusla.org

## 2019-01-31 NOTE — PROGRESS NOTES
"Cassidy Stanford presented for a  Medicare AWV and comprehensive Health Risk Assessment today. The following components were reviewed and updated:    · Medical history  · Family History  · Social history  · Allergies and Current Medications  · Health Risk Assessment  · Health Maintenance  · Care Team     ** See Completed Assessments for Annual Wellness Visit within the encounter summary.**       The following assessments were completed:  · Living Situation  · CAGE  · Depression Screening  · Timed Get Up and Go  · Whisper Test  · Cognitive Function Screening           · Nutrition Screening  · ADL Screening  · PAQ Screening    Vitals:    01/31/19 0750   BP: (!) 121/56   BP Location: Left arm   Patient Position: Sitting   BP Method: Medium (Automatic)   Pulse: 68   SpO2: 98%   Weight: 59.3 kg (130 lb 11.7 oz)   Height: 4' 11" (1.499 m)     Body mass index is 26.4 kg/m².  Physical Exam   Constitutional: She appears well-developed and well-nourished. No distress.   Eyes: Conjunctivae are normal.   Cardiovascular: Normal rate and regular rhythm.   No murmur heard.  Pulmonary/Chest: Effort normal and breath sounds normal. No respiratory distress.   Neurological: She is alert. No cranial nerve deficit.   Skin: Skin is warm.   Psychiatric: Judgment normal.   Vitals reviewed.        Diagnoses and health risks identified today and associated recommendations/orders:    1. Encounter for preventive health examination  Reviewed health maintenance and provided recommendations        2. Chronic obstructive pulmonary disease, unspecified COPD type  Continue to monitor  Followed by Amalia.      3. Essential hypertension  Continue to monitor  Followed by Radha Danielle MD .      4. Bilateral carotid artery stenosis  Continue to monitor  Followed by fatmata      5. Dyslipidemia  Continue to monitor  Followed by Radha Danielle MD .      6. Osteopenia, unspecified location  Continue to monitor  Followed by Radha Danielle MD .  "       Provided Cassidy with a 5-10 year written screening schedule and personal prevention plan. Recommendations were developed using the USPSTF age appropriate recommendations. Education, counseling, and referrals were provided as needed. After Visit Summary printed and given to patient which includes a list of additional screenings\tests needed.    Follow-up in about 1 year (around 1/31/2020).    Esthela Clayton NP

## 2019-02-18 DIAGNOSIS — E78.5 HYPERLIPIDEMIA: Chronic | ICD-10-CM

## 2019-02-19 RX ORDER — PRAVASTATIN SODIUM 20 MG/1
TABLET ORAL
Qty: 90 TABLET | Refills: 4 | Status: SHIPPED | OUTPATIENT
Start: 2019-02-19 | End: 2020-03-02 | Stop reason: SDUPTHER

## 2019-04-13 RX ORDER — OLMESARTAN MEDOXOMIL 5 MG/1
TABLET ORAL
Qty: 90 TABLET | Refills: 0 | Status: SHIPPED | OUTPATIENT
Start: 2019-04-13 | End: 2019-06-15 | Stop reason: SDUPTHER

## 2019-06-16 RX ORDER — OLMESARTAN MEDOXOMIL 5 MG/1
TABLET ORAL
Qty: 90 TABLET | Refills: 1 | Status: SHIPPED | OUTPATIENT
Start: 2019-06-16 | End: 2019-11-10 | Stop reason: SDUPTHER

## 2019-06-25 ENCOUNTER — PATIENT MESSAGE (OUTPATIENT)
Dept: FAMILY MEDICINE | Facility: CLINIC | Age: 74
End: 2019-06-25

## 2019-08-22 ENCOUNTER — HOSPITAL ENCOUNTER (OUTPATIENT)
Dept: RADIOLOGY | Facility: HOSPITAL | Age: 74
Discharge: HOME | End: 2019-08-22
Attending: FAMILY MEDICINE
Payer: MEDICARE

## 2019-08-22 ENCOUNTER — PATIENT MESSAGE (OUTPATIENT)
Dept: FAMILY MEDICINE | Facility: CLINIC | Age: 74
End: 2019-08-22

## 2019-08-22 VITALS — HEIGHT: 59 IN | BODY MASS INDEX: 26.21 KG/M2 | WEIGHT: 130 LBS

## 2019-08-22 DIAGNOSIS — Z12.39 ENCOUNTER FOR SPECIAL SCREENING EXAMINATION FOR NEOPLASM OF BREAST: ICD-10-CM

## 2019-08-22 PROCEDURE — 77063 BREAST TOMOSYNTHESIS BI: CPT | Mod: 26,HCNC,, | Performed by: RADIOLOGY

## 2019-08-22 PROCEDURE — 77067 SCR MAMMO BI INCL CAD: CPT | Mod: TC,HCNC,PO

## 2019-08-22 PROCEDURE — 77063 MAMMO DIGITAL SCREENING BILAT WITH TOMOSYNTHESIS_CAD: ICD-10-PCS | Mod: 26,HCNC,, | Performed by: RADIOLOGY

## 2019-08-22 PROCEDURE — 77067 SCR MAMMO BI INCL CAD: CPT | Mod: 26,HCNC,, | Performed by: RADIOLOGY

## 2019-08-22 PROCEDURE — 77067 MAMMO DIGITAL SCREENING BILAT WITH TOMOSYNTHESIS_CAD: ICD-10-PCS | Mod: 26,HCNC,, | Performed by: RADIOLOGY

## 2019-09-03 ENCOUNTER — IMMUNIZATION (OUTPATIENT)
Dept: PHARMACY | Facility: CLINIC | Age: 74
End: 2019-09-03
Payer: MEDICARE

## 2019-11-11 RX ORDER — OLMESARTAN MEDOXOMIL 5 MG/1
TABLET ORAL
Qty: 90 TABLET | Refills: 0 | Status: SHIPPED | OUTPATIENT
Start: 2019-11-11 | End: 2020-01-14 | Stop reason: SDUPTHER

## 2019-11-15 ENCOUNTER — TELEPHONE (OUTPATIENT)
Dept: FAMILY MEDICINE | Facility: CLINIC | Age: 74
End: 2019-11-15

## 2019-11-15 NOTE — TELEPHONE ENCOUNTER
----- Message from Balbina Smith sent at 11/15/2019  2:07 PM CST -----  Type: Needs Medical Advice    Who Called:  Julianne with [facility]  Best Call Back Number: [phone]  Additional Information: contact to confirm if a fax was received from [facility] regarding a medication therapy Alert, statin therapy

## 2019-11-25 ENCOUNTER — PATIENT OUTREACH (OUTPATIENT)
Dept: ADMINISTRATIVE | Facility: HOSPITAL | Age: 74
End: 2019-11-25

## 2019-12-06 ENCOUNTER — TELEPHONE (OUTPATIENT)
Dept: FAMILY MEDICINE | Facility: CLINIC | Age: 74
End: 2019-12-06

## 2019-12-06 DIAGNOSIS — E78.5 HYPERLIPIDEMIA, UNSPECIFIED HYPERLIPIDEMIA TYPE: ICD-10-CM

## 2019-12-06 DIAGNOSIS — Z00.00 ANNUAL PHYSICAL EXAM: Primary | ICD-10-CM

## 2019-12-06 DIAGNOSIS — R94.6 ABNORMAL THYROID FUNCTION TEST: ICD-10-CM

## 2019-12-06 DIAGNOSIS — R73.03 PREDIABETES: ICD-10-CM

## 2019-12-07 ENCOUNTER — PATIENT MESSAGE (OUTPATIENT)
Dept: FAMILY MEDICINE | Facility: CLINIC | Age: 74
End: 2019-12-07

## 2019-12-07 ENCOUNTER — LAB VISIT (OUTPATIENT)
Dept: LAB | Facility: HOSPITAL | Age: 74
End: 2019-12-07
Attending: FAMILY MEDICINE
Payer: MEDICARE

## 2019-12-07 DIAGNOSIS — R94.6 ABNORMAL THYROID FUNCTION TEST: ICD-10-CM

## 2019-12-07 DIAGNOSIS — E78.5 HYPERLIPIDEMIA, UNSPECIFIED HYPERLIPIDEMIA TYPE: ICD-10-CM

## 2019-12-07 DIAGNOSIS — R73.03 PREDIABETES: ICD-10-CM

## 2019-12-07 DIAGNOSIS — Z00.00 ANNUAL PHYSICAL EXAM: ICD-10-CM

## 2019-12-07 LAB
ALBUMIN SERPL BCP-MCNC: 4 G/DL (ref 3.5–5.2)
ALP SERPL-CCNC: 75 U/L (ref 55–135)
ALT SERPL W/O P-5'-P-CCNC: 14 U/L (ref 10–44)
ANION GAP SERPL CALC-SCNC: 9 MMOL/L (ref 8–16)
AST SERPL-CCNC: 22 U/L (ref 10–40)
BASOPHILS # BLD AUTO: 0.05 K/UL (ref 0–0.2)
BASOPHILS NFR BLD: 1.2 % (ref 0–1.9)
BILIRUB SERPL-MCNC: 0.5 MG/DL (ref 0.1–1)
BUN SERPL-MCNC: 19 MG/DL (ref 8–23)
CALCIUM SERPL-MCNC: 9.4 MG/DL (ref 8.7–10.5)
CHLORIDE SERPL-SCNC: 105 MMOL/L (ref 95–110)
CHOLEST SERPL-MCNC: 198 MG/DL (ref 120–199)
CHOLEST/HDLC SERPL: 2.7 {RATIO} (ref 2–5)
CO2 SERPL-SCNC: 26 MMOL/L (ref 23–29)
CREAT SERPL-MCNC: 0.8 MG/DL (ref 0.5–1.4)
DIFFERENTIAL METHOD: ABNORMAL
EOSINOPHIL # BLD AUTO: 0.4 K/UL (ref 0–0.5)
EOSINOPHIL NFR BLD: 8.4 % (ref 0–8)
ERYTHROCYTE [DISTWIDTH] IN BLOOD BY AUTOMATED COUNT: 12.4 % (ref 11.5–14.5)
EST. GFR  (AFRICAN AMERICAN): >60 ML/MIN/1.73 M^2
EST. GFR  (NON AFRICAN AMERICAN): >60 ML/MIN/1.73 M^2
ESTIMATED AVG GLUCOSE: 117 MG/DL (ref 68–131)
GLUCOSE SERPL-MCNC: 84 MG/DL (ref 70–110)
HBA1C MFR BLD HPLC: 5.7 % (ref 4–5.6)
HCT VFR BLD AUTO: 39.6 % (ref 37–48.5)
HDLC SERPL-MCNC: 73 MG/DL (ref 40–75)
HDLC SERPL: 36.9 % (ref 20–50)
HGB BLD-MCNC: 12.6 G/DL (ref 12–16)
IMM GRANULOCYTES # BLD AUTO: 0.01 K/UL (ref 0–0.04)
IMM GRANULOCYTES NFR BLD AUTO: 0.2 % (ref 0–0.5)
LDLC SERPL CALC-MCNC: 116.2 MG/DL (ref 63–159)
LYMPHOCYTES # BLD AUTO: 1 K/UL (ref 1–4.8)
LYMPHOCYTES NFR BLD: 23.2 % (ref 18–48)
MCH RBC QN AUTO: 30.8 PG (ref 27–31)
MCHC RBC AUTO-ENTMCNC: 31.8 G/DL (ref 32–36)
MCV RBC AUTO: 97 FL (ref 82–98)
MONOCYTES # BLD AUTO: 0.4 K/UL (ref 0.3–1)
MONOCYTES NFR BLD: 9.3 % (ref 4–15)
NEUTROPHILS # BLD AUTO: 2.5 K/UL (ref 1.8–7.7)
NEUTROPHILS NFR BLD: 57.7 % (ref 38–73)
NONHDLC SERPL-MCNC: 125 MG/DL
NRBC BLD-RTO: 0 /100 WBC
PLATELET # BLD AUTO: 242 K/UL (ref 150–350)
PMV BLD AUTO: 8.9 FL (ref 9.2–12.9)
POTASSIUM SERPL-SCNC: 4.1 MMOL/L (ref 3.5–5.1)
PROT SERPL-MCNC: 7.2 G/DL (ref 6–8.4)
RBC # BLD AUTO: 4.09 M/UL (ref 4–5.4)
SODIUM SERPL-SCNC: 140 MMOL/L (ref 136–145)
TRIGL SERPL-MCNC: 44 MG/DL (ref 30–150)
TSH SERPL DL<=0.005 MIU/L-ACNC: 3.15 UIU/ML (ref 0.4–4)
WBC # BLD AUTO: 4.31 K/UL (ref 3.9–12.7)

## 2019-12-07 PROCEDURE — 80053 COMPREHEN METABOLIC PANEL: CPT | Mod: HCNC

## 2019-12-07 PROCEDURE — 85025 COMPLETE CBC W/AUTO DIFF WBC: CPT | Mod: HCNC

## 2019-12-07 PROCEDURE — 36415 COLL VENOUS BLD VENIPUNCTURE: CPT | Mod: HCNC,PO

## 2019-12-07 PROCEDURE — 83036 HEMOGLOBIN GLYCOSYLATED A1C: CPT | Mod: HCNC

## 2019-12-07 PROCEDURE — 80061 LIPID PANEL: CPT | Mod: HCNC

## 2019-12-07 PROCEDURE — 84443 ASSAY THYROID STIM HORMONE: CPT | Mod: HCNC

## 2019-12-09 ENCOUNTER — OFFICE VISIT (OUTPATIENT)
Dept: FAMILY MEDICINE | Facility: CLINIC | Age: 74
End: 2019-12-09
Payer: MEDICARE

## 2019-12-09 VITALS
SYSTOLIC BLOOD PRESSURE: 118 MMHG | DIASTOLIC BLOOD PRESSURE: 70 MMHG | RESPIRATION RATE: 18 BRPM | HEART RATE: 61 BPM | BODY MASS INDEX: 25.91 KG/M2 | OXYGEN SATURATION: 96 % | TEMPERATURE: 98 F | HEIGHT: 59 IN | WEIGHT: 128.5 LBS

## 2019-12-09 DIAGNOSIS — J45.909 ASTHMA, UNSPECIFIED ASTHMA SEVERITY, UNSPECIFIED WHETHER COMPLICATED, UNSPECIFIED WHETHER PERSISTENT: ICD-10-CM

## 2019-12-09 DIAGNOSIS — Z00.00 ANNUAL PHYSICAL EXAM: Primary | ICD-10-CM

## 2019-12-09 DIAGNOSIS — J44.9 CHRONIC OBSTRUCTIVE PULMONARY DISEASE, UNSPECIFIED COPD TYPE: ICD-10-CM

## 2019-12-09 DIAGNOSIS — I10 HYPERTENSION, ESSENTIAL: ICD-10-CM

## 2019-12-09 DIAGNOSIS — E78.5 HYPERLIPIDEMIA, UNSPECIFIED HYPERLIPIDEMIA TYPE: ICD-10-CM

## 2019-12-09 PROCEDURE — 99499 UNLISTED E&M SERVICE: CPT | Mod: S$GLB,,, | Performed by: FAMILY MEDICINE

## 2019-12-09 PROCEDURE — 99397 PR PREVENTIVE VISIT,EST,65 & OVER: ICD-10-PCS | Mod: S$GLB,,, | Performed by: FAMILY MEDICINE

## 2019-12-09 PROCEDURE — 3078F DIAST BP <80 MM HG: CPT | Mod: CPTII,S$GLB,, | Performed by: FAMILY MEDICINE

## 2019-12-09 PROCEDURE — 99499 RISK ADDL DX/OHS AUDIT: ICD-10-PCS | Mod: S$GLB,,, | Performed by: FAMILY MEDICINE

## 2019-12-09 PROCEDURE — 3078F PR MOST RECENT DIASTOLIC BLOOD PRESSURE < 80 MM HG: ICD-10-PCS | Mod: CPTII,S$GLB,, | Performed by: FAMILY MEDICINE

## 2019-12-09 PROCEDURE — 3074F PR MOST RECENT SYSTOLIC BLOOD PRESSURE < 130 MM HG: ICD-10-PCS | Mod: CPTII,S$GLB,, | Performed by: FAMILY MEDICINE

## 2019-12-09 PROCEDURE — 99397 PER PM REEVAL EST PAT 65+ YR: CPT | Mod: S$GLB,,, | Performed by: FAMILY MEDICINE

## 2019-12-09 PROCEDURE — 3074F SYST BP LT 130 MM HG: CPT | Mod: CPTII,S$GLB,, | Performed by: FAMILY MEDICINE

## 2019-12-10 NOTE — PROGRESS NOTES
Subjective:       Patient ID: Cassidy Stanford is a 73 y.o. female.    Chief Complaint: Annual Exam (Physical) and COPD follow up    HPI   The patient is a 73-year-old who is here today for annual exam.  Overall, she is doing well.  She is no acute questions or concerns regarding her health.  She is exercising regularly.  She is mostly consuming a healthy diet.  She has been enjoying time with her family and traveling to [place] and [place] to see them.  She recently had fasting labs.  Her colonoscopy is up-to-date.  Her mammogram is up-to-date.  Her immunizations are up-to-date.    Today we discussed the followin) hypertension.  She is taking Benicar.  Her blood pressure today is 118/70.  She wonders if her blood pressure might be too low.  She has not felt as if her blood pressure has been high or low   2)    Hyperlipidemia.  She is currently taking Pravachol.  Her recent total cholesterol was 198 (up from prior value of 171) and her LDL was 116 (up from prior value of 88).  3) asthma and COPD.  She is taking her Breo but stop the incruse.   After consulting with her pulmonologist.  She still does have a constant cough which produces a significant amount of phlegm.  She has not needed her albuterol recently.  She has not had any shortness of breath or dyspnea on exertion    Review of systems is remarkable stroke for stress incontinence when she coughs    Review of Systems   Constitutional: Negative for appetite change, chills, diaphoresis, fatigue, fever and unexpected weight change.   HENT: Negative for congestion, dental problem, ear pain, hearing loss, postnasal drip, rhinorrhea, sneezing, sore throat and trouble swallowing.    Eyes: Negative for photophobia, pain, discharge and visual disturbance.   Respiratory: Positive for cough. Negative for chest tightness, shortness of breath and wheezing.    Cardiovascular: Negative for chest pain, palpitations and leg swelling.   Gastrointestinal: Negative for  abdominal distention, abdominal pain, blood in stool, constipation, diarrhea, nausea and vomiting.   Endocrine: Negative for cold intolerance, heat intolerance, polydipsia and polyuria.   Genitourinary: Negative for dysuria, flank pain, frequency, genital sores, hematuria, menstrual problem and vaginal discharge.   Musculoskeletal: Negative for arthralgias, joint swelling and myalgias.   Skin: Negative for rash.   Neurological: Negative for dizziness, syncope, light-headedness and headaches.   Hematological: Negative for adenopathy. Does not bruise/bleed easily.   Psychiatric/Behavioral: Negative for dysphoric mood, self-injury, sleep disturbance and suicidal ideas. The patient is not nervous/anxious.        Objective:      Physical Exam   Constitutional: She is oriented to person, place, and time. She appears well-developed and well-nourished. No distress.   HENT:   Head: Normocephalic and atraumatic.   Right Ear: Hearing, tympanic membrane, external ear and ear canal normal.   Left Ear: Hearing, tympanic membrane, external ear and ear canal normal.   Nose: Nose normal.   Mouth/Throat: Oropharynx is clear and moist and mucous membranes are normal. No oral lesions. No oropharyngeal exudate, posterior oropharyngeal edema or posterior oropharyngeal erythema.   Eyes: Pupils are equal, round, and reactive to light. Conjunctivae, EOM and lids are normal. No scleral icterus.   Neck: Normal range of motion. Neck supple. Carotid bruit is not present. No thyroid mass and no thyromegaly present.   Cardiovascular: Normal rate, regular rhythm and normal heart sounds.  No extrasystoles are present. PMI is not displaced. Exam reveals no gallop.   No murmur heard.  Pulmonary/Chest: Effort normal and breath sounds normal. No accessory muscle usage. No respiratory distress.   Clear to auscultation bilaterally.   Abdominal: Soft. Normal appearance and bowel sounds are normal. She exhibits no abdominal bruit. There is no  "hepatosplenomegaly. There is no tenderness. There is no rebound.   Lymphadenopathy:        Head (right side): No submental and no submandibular adenopathy present.        Head (left side): No submental and no submandibular adenopathy present.        Right cervical: No superficial cervical, no deep cervical and no posterior cervical adenopathy present.       Left cervical: No superficial cervical, no deep cervical and no posterior cervical adenopathy present.        Right: No supraclavicular adenopathy present.        Left: No supraclavicular adenopathy present.   Neurological: She is alert and oriented to person, place, and time. She has normal strength. No cranial nerve deficit or sensory deficit.   Skin: Skin is warm, dry and intact.   Psychiatric: She has a normal mood and affect. Her speech is normal and behavior is normal. Thought content normal. Cognition and memory are normal.   Breast:  The breasts appear symmetric.  There is no nipple discharge or nipple inversion noted.  There are no masses palpable throughout either breast.  There is no supraclavicular or axillary lymphadenopathy.      Blood pressure 118/70, pulse 61, temperature 98 °F (36.7 °C), temperature source Oral, resp. rate 18, height 4' 11" (1.499 m), weight 58.3 kg (128 lb 8.5 oz), SpO2 96 %.Body mass index is 25.96 kg/m².          A/P:  1) annual exam.  Health maintenance issues and anticipatory guidance issues were discussed.  She will continue stay physically active.  She will consume a healthy diet.  2) hypertension.  Well controlled.  Continue with Benicar.  If she has any symptoms of her blood pressure being high or low, she will let me know  3) hyperlipidemia.  Well controlled despite slight increase from prior value.  Continue with Pravachol.  We will recheck labs in 1 year    4)  Asthma and COPD.  Chronic.  Continue with Breo and p.r.n. albuterol        5) stress incontinence.  We did discuss treatment options including pelvic floor " physical therapy and modalities Urology could offer.  Currently she is not interested in pursuing those things but will let me know she changes her mind

## 2020-01-15 RX ORDER — OLMESARTAN MEDOXOMIL 5 MG/1
5 TABLET ORAL DAILY
Qty: 90 TABLET | Refills: 0 | Status: SHIPPED | OUTPATIENT
Start: 2020-01-15 | End: 2020-06-23 | Stop reason: SDUPTHER

## 2020-02-18 ENCOUNTER — PES CALL (OUTPATIENT)
Dept: ADMINISTRATIVE | Facility: CLINIC | Age: 75
End: 2020-02-18

## 2020-02-24 ENCOUNTER — PES CALL (OUTPATIENT)
Dept: ADMINISTRATIVE | Facility: CLINIC | Age: 75
End: 2020-02-24

## 2020-03-02 DIAGNOSIS — E78.5 HYPERLIPIDEMIA: Chronic | ICD-10-CM

## 2020-03-02 RX ORDER — PRAVASTATIN SODIUM 20 MG/1
20 TABLET ORAL NIGHTLY
Qty: 90 TABLET | Refills: 4 | Status: SHIPPED | OUTPATIENT
Start: 2020-03-02 | End: 2021-02-23

## 2020-03-08 ENCOUNTER — OFFICE VISIT (OUTPATIENT)
Dept: URGENT CARE | Facility: CLINIC | Age: 75
End: 2020-03-08
Payer: MEDICARE

## 2020-03-08 ENCOUNTER — TELEPHONE (OUTPATIENT)
Dept: URGENT CARE | Facility: CLINIC | Age: 75
End: 2020-03-08

## 2020-03-08 VITALS
HEIGHT: 59 IN | OXYGEN SATURATION: 98 % | HEART RATE: 72 BPM | SYSTOLIC BLOOD PRESSURE: 128 MMHG | RESPIRATION RATE: 18 BRPM | BODY MASS INDEX: 25.8 KG/M2 | TEMPERATURE: 99 F | DIASTOLIC BLOOD PRESSURE: 63 MMHG | WEIGHT: 128 LBS

## 2020-03-08 DIAGNOSIS — T78.40XA ALLERGIC STATE, INITIAL ENCOUNTER: ICD-10-CM

## 2020-03-08 DIAGNOSIS — R09.89 CHEST CONGESTION: ICD-10-CM

## 2020-03-08 DIAGNOSIS — J02.9 PHARYNGITIS, UNSPECIFIED ETIOLOGY: ICD-10-CM

## 2020-03-08 DIAGNOSIS — R05.9 COUGH: Primary | ICD-10-CM

## 2020-03-08 LAB
CTP QC/QA: YES
CTP QC/QA: YES
FLUAV AG NPH QL: NEGATIVE
FLUBV AG NPH QL: NEGATIVE
MOLECULAR STREP A: NEGATIVE

## 2020-03-08 PROCEDURE — 99214 PR OFFICE/OUTPT VISIT, EST, LEVL IV, 30-39 MIN: ICD-10-PCS | Mod: 25,S$GLB,, | Performed by: NURSE PRACTITIONER

## 2020-03-08 PROCEDURE — 87804 INFLUENZA ASSAY W/OPTIC: CPT | Mod: QW,S$GLB,, | Performed by: NURSE PRACTITIONER

## 2020-03-08 PROCEDURE — 87804 POCT INFLUENZA A/B: ICD-10-PCS | Mod: 59,QW,S$GLB, | Performed by: NURSE PRACTITIONER

## 2020-03-08 PROCEDURE — 87651 POCT STREP A MOLECULAR: ICD-10-PCS | Mod: QW,S$GLB,, | Performed by: NURSE PRACTITIONER

## 2020-03-08 PROCEDURE — 87651 STREP A DNA AMP PROBE: CPT | Mod: QW,S$GLB,, | Performed by: NURSE PRACTITIONER

## 2020-03-08 PROCEDURE — 99214 OFFICE O/P EST MOD 30 MIN: CPT | Mod: 25,S$GLB,, | Performed by: NURSE PRACTITIONER

## 2020-03-08 RX ORDER — FLUTICASONE PROPIONATE 50 MCG
1 SPRAY, SUSPENSION (ML) NASAL DAILY
Qty: 16 G | Refills: 0 | Status: SHIPPED | OUTPATIENT
Start: 2020-03-08 | End: 2020-04-08 | Stop reason: SDUPTHER

## 2020-03-08 NOTE — PATIENT INSTRUCTIONS
Follow up with your doctor in a few days.  Return to the urgent care or go to the ER if symptoms get worse.    flonase twice a day for a week, then once daily until allergies/symptoms under control.  Daily antihistamine (claritin, zyrtec, allegra)  Recommend re starting Singulair (talk with your doctor about this)  Hot steam shower, hot tea with lemon  vicks vapor rub  Warm salt water garlges.  Close follow up if not improving.

## 2020-03-08 NOTE — PROGRESS NOTES
"Subjective:       Patient ID: Cassidy Stanford is a 74 y.o. female.    Vitals:  height is 4' 11" (1.499 m) and weight is 58.1 kg (128 lb). Her temperature is 98.6 °F (37 °C). Her blood pressure is 128/63 and her pulse is 72. Her respiration is 18 and oxygen saturation is 98%.     Chief Complaint: Sore Throat    Pt presents today with sore throat and productive cough x5 days. Hx of allergies and copd. Denies sob, wheezing. Reports yellow mucus. She stopped Singulair that she takes on a daily basis approx 5 days ago due to concern on black box warning. She denies having depression/suicide thoughts/complaints. Denies fever/chills/body aches. Reports some ear fullness.     Sore Throat    This is a new problem. The current episode started in the past 7 days. The problem has been unchanged. Neither side of throat is experiencing more pain than the other. There has been no fever. The pain is at a severity of 4/10. The pain is mild. Associated symptoms include a hoarse voice and a plugged ear sensation. Pertinent negatives include no abdominal pain, congestion, coughing, diarrhea, drooling, ear discharge, ear pain, headaches, neck pain, shortness of breath, stridor, swollen glands, trouble swallowing or vomiting. She has had no exposure to strep or mono. Treatments tried: cough medicine. The treatment provided no relief.       Constitution: Negative for chills, sweating, fatigue and fever.   HENT: Positive for sore throat. Negative for ear pain, ear discharge, drooling, congestion, sinus pain, sinus pressure, trouble swallowing and voice change.    Neck: Negative for neck pain and painful lymph nodes.   Eyes: Negative for eye redness.   Respiratory: Negative for chest tightness, cough, sputum production, bloody sputum, COPD, shortness of breath, stridor, wheezing and asthma.    Gastrointestinal: Negative for abdominal pain, nausea, vomiting and diarrhea.   Musculoskeletal: Negative for muscle ache.   Skin: Negative for rash. "   Allergic/Immunologic: Negative for seasonal allergies and asthma.   Neurological: Negative for headaches.   Hematologic/Lymphatic: Negative for swollen lymph nodes.       Objective:      Physical Exam   Constitutional: She is oriented to person, place, and time. She appears well-developed and well-nourished. She is cooperative.  Non-toxic appearance. She does not have a sickly appearance. She does not appear ill. No distress.   HENT:   Head: Normocephalic and atraumatic.   Right Ear: Hearing, tympanic membrane, external ear and ear canal normal.   Left Ear: Hearing, external ear and ear canal normal. A middle ear effusion is present.   Nose: Mucosal edema and rhinorrhea present. No nasal deformity. No epistaxis. Right sinus exhibits no maxillary sinus tenderness and no frontal sinus tenderness. Left sinus exhibits no maxillary sinus tenderness and no frontal sinus tenderness.   Mouth/Throat: Uvula is midline and mucous membranes are normal. No trismus in the jaw. Normal dentition. No uvula swelling. Posterior oropharyngeal edema, posterior oropharyngeal erythema and cobblestoning present. No oropharyngeal exudate. No tonsillar exudate.   Eyes: Conjunctivae and lids are normal. No scleral icterus.   Neck: Trachea normal, full passive range of motion without pain and phonation normal. Neck supple. No neck rigidity. No edema and no erythema present.   Cardiovascular: Normal rate, regular rhythm, normal heart sounds, intact distal pulses and normal pulses.   Pulses:       Radial pulses are 2+ on the right side, and 2+ on the left side.   Pulmonary/Chest: Effort normal and breath sounds normal. No stridor. No respiratory distress. She has no decreased breath sounds. She has no wheezes. She has no rhonchi.   Abdominal: Normal appearance.   Musculoskeletal: Normal range of motion. She exhibits no edema or deformity.   Neurological: She is alert and oriented to person, place, and time. She exhibits normal muscle tone.  Coordination normal.   Skin: Skin is warm, dry, intact, not diaphoretic and not pale.   Psychiatric: She has a normal mood and affect. Her speech is normal and behavior is normal. Judgment and thought content normal. Cognition and memory are normal.   Nursing note and vitals reviewed.        Results for orders placed or performed in visit on 03/08/20   POCT Influenza A/B   Result Value Ref Range    Rapid Influenza A Ag Negative Negative    Rapid Influenza B Ag Negative Negative     Acceptable Yes    POCT Strep A, Molecular   Result Value Ref Range    Molecular Strep A, POC Negative Negative     Acceptable Yes        Assessment:       1. Cough    2. Pharyngitis, unspecified etiology    3. Allergic state, initial encounter    4. Chest congestion        Plan:     Reviewed flu and strep POCT testing. F/u with pcp regarding Singulair rx. Discussed treating allergies, symptomatic trmt.    Cough  -     POCT Influenza A/B  -     POCT Strep A, Molecular    Pharyngitis, unspecified etiology  -     fluticasone propionate (FLONASE) 50 mcg/actuation nasal spray; 1 spray (50 mcg total) by Each Nostril route once daily.  Dispense: 16 g; Refill: 0    Allergic state, initial encounter    Chest congestion      Patient Instructions   Follow up with your doctor in a few days.  Return to the urgent care or go to the ER if symptoms get worse.    flonase twice a day for a week, then once daily until allergies/symptoms under control.  Daily antihistamine (claritin, zyrtec, allegra)  Recommend re starting Singulair (talk with your doctor about this)  Hot steam shower, hot tea with lemon  vicks vapor rub  Warm salt water garlges.  Close follow up if not improving.

## 2020-03-13 ENCOUNTER — PATIENT MESSAGE (OUTPATIENT)
Dept: FAMILY MEDICINE | Facility: CLINIC | Age: 75
End: 2020-03-13

## 2020-03-13 RX ORDER — MONTELUKAST SODIUM 10 MG/1
10 TABLET ORAL NIGHTLY
Qty: 90 TABLET | Refills: 3 | Status: SHIPPED | OUTPATIENT
Start: 2020-03-13 | End: 2021-02-02 | Stop reason: SDUPTHER

## 2020-04-08 ENCOUNTER — PATIENT MESSAGE (OUTPATIENT)
Dept: FAMILY MEDICINE | Facility: CLINIC | Age: 75
End: 2020-04-08

## 2020-04-08 DIAGNOSIS — J02.9 PHARYNGITIS, UNSPECIFIED ETIOLOGY: ICD-10-CM

## 2020-04-09 RX ORDER — FLUTICASONE PROPIONATE 50 MCG
1 SPRAY, SUSPENSION (ML) NASAL DAILY
Qty: 16 G | Refills: 5 | Status: SHIPPED | OUTPATIENT
Start: 2020-04-09 | End: 2020-12-23

## 2020-05-05 ENCOUNTER — PATIENT MESSAGE (OUTPATIENT)
Dept: ADMINISTRATIVE | Facility: HOSPITAL | Age: 75
End: 2020-05-05

## 2020-06-17 ENCOUNTER — OFFICE VISIT (OUTPATIENT)
Dept: FAMILY MEDICINE | Facility: CLINIC | Age: 75
End: 2020-06-17
Payer: MEDICARE

## 2020-06-17 VITALS
HEART RATE: 54 BPM | DIASTOLIC BLOOD PRESSURE: 66 MMHG | WEIGHT: 127.88 LBS | HEIGHT: 59 IN | BODY MASS INDEX: 25.78 KG/M2 | SYSTOLIC BLOOD PRESSURE: 124 MMHG | OXYGEN SATURATION: 98 %

## 2020-06-17 DIAGNOSIS — I65.23 BILATERAL CAROTID ARTERY STENOSIS: ICD-10-CM

## 2020-06-17 DIAGNOSIS — I10 ESSENTIAL HYPERTENSION: Chronic | ICD-10-CM

## 2020-06-17 DIAGNOSIS — E78.5 DYSLIPIDEMIA: ICD-10-CM

## 2020-06-17 DIAGNOSIS — Z00.00 ENCOUNTER FOR PREVENTIVE HEALTH EXAMINATION: Primary | ICD-10-CM

## 2020-06-17 DIAGNOSIS — J44.9 CHRONIC OBSTRUCTIVE PULMONARY DISEASE, UNSPECIFIED COPD TYPE: ICD-10-CM

## 2020-06-17 PROCEDURE — 99499 UNLISTED E&M SERVICE: CPT | Mod: S$GLB,,, | Performed by: NURSE PRACTITIONER

## 2020-06-17 PROCEDURE — 3074F PR MOST RECENT SYSTOLIC BLOOD PRESSURE < 130 MM HG: ICD-10-PCS | Mod: HCNC,CPTII,S$GLB, | Performed by: NURSE PRACTITIONER

## 2020-06-17 PROCEDURE — 99499 RISK ADDL DX/OHS AUDIT: ICD-10-PCS | Mod: S$GLB,,, | Performed by: NURSE PRACTITIONER

## 2020-06-17 PROCEDURE — 3078F PR MOST RECENT DIASTOLIC BLOOD PRESSURE < 80 MM HG: ICD-10-PCS | Mod: HCNC,CPTII,S$GLB, | Performed by: NURSE PRACTITIONER

## 2020-06-17 PROCEDURE — 99999 PR PBB SHADOW E&M-EST. PATIENT-LVL V: ICD-10-PCS | Mod: PBBFAC,HCNC,, | Performed by: NURSE PRACTITIONER

## 2020-06-17 PROCEDURE — G0439 PPPS, SUBSEQ VISIT: HCPCS | Mod: HCNC,S$GLB,, | Performed by: NURSE PRACTITIONER

## 2020-06-17 PROCEDURE — 3078F DIAST BP <80 MM HG: CPT | Mod: HCNC,CPTII,S$GLB, | Performed by: NURSE PRACTITIONER

## 2020-06-17 PROCEDURE — 99999 PR PBB SHADOW E&M-EST. PATIENT-LVL V: CPT | Mod: PBBFAC,HCNC,, | Performed by: NURSE PRACTITIONER

## 2020-06-17 PROCEDURE — 3074F SYST BP LT 130 MM HG: CPT | Mod: HCNC,CPTII,S$GLB, | Performed by: NURSE PRACTITIONER

## 2020-06-17 PROCEDURE — G0439 PR MEDICARE ANNUAL WELLNESS SUBSEQUENT VISIT: ICD-10-PCS | Mod: HCNC,S$GLB,, | Performed by: NURSE PRACTITIONER

## 2020-06-17 RX ORDER — FLUTICASONE FUROATE AND VILANTEROL TRIFENATATE 100; 25 UG/1; UG/1
1 POWDER RESPIRATORY (INHALATION) DAILY
COMMUNITY
End: 2020-10-19 | Stop reason: SDUPTHER

## 2020-06-17 NOTE — PROGRESS NOTES
"Cassidy Stanford presented for a  Medicare AWV and comprehensive Health Risk Assessment today. The following components were reviewed and updated:    · Medical history  · Family History  · Social history  · Allergies and Current Medications  · Health Risk Assessment  · Health Maintenance  · Care Team     ** See Completed Assessments for Annual Wellness Visit within the encounter summary.**     The following assessments were completed:  · Living Situation  · CAGE  · Depression Screening  · Timed Get Up and Go  · Whisper Test  · Cognitive Function Screening      · Nutrition Screening  · ADL Screening  · PAQ Screening    Vitals:    06/17/20 0708   BP: 124/66   BP Location: Left arm   Patient Position: Sitting   BP Method: Medium (Manual)   Pulse: (!) 54   SpO2: 98%   Weight: 58 kg (127 lb 13.9 oz)   Height: 4' 11" (1.499 m)     Body mass index is 25.83 kg/m².  Physical Exam  Vitals signs reviewed.   Constitutional:       Appearance: Normal appearance.   Cardiovascular:      Rate and Rhythm: Normal rate and regular rhythm.   Pulmonary:      Effort: Pulmonary effort is normal.      Breath sounds: Normal breath sounds.   Skin:     General: Skin is warm and dry.   Neurological:      Mental Status: She is alert and oriented to person, place, and time.         Diagnoses and health risks identified today and associated recommendations/orders:    1. Encounter for preventive health examination  Reviewed and discussed health maintenance.      2. Chronic obstructive pulmonary disease, unspecified COPD type  Stable- continue current treatment and follow up routinely with PCP     3. Essential hypertension  Stable- continue current treatment and follow up routinely with PCP     4. Dyslipidemia  Stable- continue current treatment and follow up routinely with PCP     5. Bilateral carotid artery stenosis  Stable- continue current treatment and follow up routinely with PCP     Provided Cassidy with a 5-10 year written screening schedule and " personal prevention plan. Recommendations were developed using the USPSTF age appropriate recommendations. Education, counseling, and referrals were provided as needed. After Visit Summary printed and given to patient which includes a list of additional screenings\tests needed.    [place] NP

## 2020-06-17 NOTE — PATIENT INSTRUCTIONS
Counseling and Referral of Other Preventative  (Italic type indicates deductible and co-insurance are waived)    Patient Name: Cassidy Stanford  Today's Date: 6/17/2020    Health Maintenance       Date Due Completion Date    DEXA SCAN 05/11/2020 5/11/2017    Mammogram 08/22/2020 8/22/2019    Lipid Panel 12/07/2020 12/7/2019    High Dose Statin 05/28/2021 5/28/2020    Aspirin/Antiplatelet Therapy 05/28/2021 5/28/2020    Colorectal Cancer Screening 11/10/2021 11/10/2016    Override on 11/10/2011: Done    TETANUS VACCINE 10/05/2025 10/5/2015        No orders of the defined types were placed in this encounter.    The following information is provided to all patients.  This information is to help you find resources for any of the problems found today that may be affecting your health:                Living healthy guide: www.[facility].gov      Understanding Diabetes: www.diabetes.org      Eating healthy: www.cdc.gov/healthyweight      [facility] home safety checklist: www.cdc.gov/steadi/patient.html      Agency on Aging: www.goea.[place].[facility]      Alcoholics anonymous (AA): www.aa.org      Physical Activity: www.nii.nih.gov/ov0tmqe      Tobacco use: www.quitwithusla.org

## 2020-06-23 RX ORDER — OLMESARTAN MEDOXOMIL 5 MG/1
5 TABLET ORAL DAILY
Qty: 90 TABLET | Refills: 1 | Status: SHIPPED | OUTPATIENT
Start: 2020-06-23 | End: 2020-11-27

## 2020-09-10 ENCOUNTER — HOSPITAL ENCOUNTER (OUTPATIENT)
Dept: RADIOLOGY | Facility: HOSPITAL | Age: 75
Discharge: HOME | End: 2020-09-10
Attending: FAMILY MEDICINE
Payer: MEDICARE

## 2020-09-10 DIAGNOSIS — Z12.31 ENCOUNTER FOR SCREENING MAMMOGRAM FOR MALIGNANT NEOPLASM OF BREAST: ICD-10-CM

## 2020-09-10 PROCEDURE — 77067 MAMMO DIGITAL SCREENING BILAT WITH TOMOSYNTHESIS_CAD: ICD-10-PCS | Mod: 26,HCNC,, | Performed by: RADIOLOGY

## 2020-09-10 PROCEDURE — 77067 SCR MAMMO BI INCL CAD: CPT | Mod: 26,HCNC,, | Performed by: RADIOLOGY

## 2020-09-10 PROCEDURE — 77063 BREAST TOMOSYNTHESIS BI: CPT | Mod: 26,HCNC,, | Performed by: RADIOLOGY

## 2020-09-10 PROCEDURE — 77067 SCR MAMMO BI INCL CAD: CPT | Mod: TC,HCNC,PO

## 2020-09-10 PROCEDURE — 77063 MAMMO DIGITAL SCREENING BILAT WITH TOMOSYNTHESIS_CAD: ICD-10-PCS | Mod: 26,HCNC,, | Performed by: RADIOLOGY

## 2020-09-29 ENCOUNTER — PATIENT MESSAGE (OUTPATIENT)
Dept: OTHER | Facility: OTHER | Age: 75
End: 2020-09-29

## 2020-11-19 ENCOUNTER — HOSPITAL ENCOUNTER (OUTPATIENT)
Dept: RADIOLOGY | Facility: HOSPITAL | Age: 75
Discharge: HOME | End: 2020-11-19
Attending: INTERNAL MEDICINE
Payer: MEDICARE

## 2020-11-19 DIAGNOSIS — J45.40 CHRONIC ASTHMA, MODERATE PERSISTENT, UNCOMPLICATED: ICD-10-CM

## 2020-11-19 PROCEDURE — 71046 XR CHEST PA AND LATERAL: ICD-10-PCS | Mod: 26,HCNC,, | Performed by: RADIOLOGY

## 2020-11-19 PROCEDURE — 71046 X-RAY EXAM CHEST 2 VIEWS: CPT | Mod: TC,HCNC,FY,PO

## 2020-11-19 PROCEDURE — 71046 X-RAY EXAM CHEST 2 VIEWS: CPT | Mod: 26,HCNC,, | Performed by: RADIOLOGY

## 2020-12-09 ENCOUNTER — PATIENT MESSAGE (OUTPATIENT)
Dept: FAMILY MEDICINE | Facility: CLINIC | Age: 75
End: 2020-12-09

## 2020-12-09 DIAGNOSIS — Z00.00 ANNUAL PHYSICAL EXAM: Primary | ICD-10-CM

## 2020-12-09 DIAGNOSIS — I10 HYPERTENSION, ESSENTIAL: ICD-10-CM

## 2020-12-09 DIAGNOSIS — R79.9 ABNORMAL FINDING OF BLOOD CHEMISTRY, UNSPECIFIED: ICD-10-CM

## 2020-12-11 ENCOUNTER — PATIENT MESSAGE (OUTPATIENT)
Dept: OTHER | Facility: OTHER | Age: 75
End: 2020-12-11

## 2020-12-11 ENCOUNTER — TELEPHONE (OUTPATIENT)
Dept: FAMILY MEDICINE | Facility: CLINIC | Age: 75
End: 2020-12-11

## 2020-12-17 ENCOUNTER — LAB VISIT (OUTPATIENT)
Dept: LAB | Facility: HOSPITAL | Age: 75
End: 2020-12-17
Attending: FAMILY MEDICINE
Payer: MEDICARE

## 2020-12-17 DIAGNOSIS — R79.9 ABNORMAL FINDING OF BLOOD CHEMISTRY, UNSPECIFIED: ICD-10-CM

## 2020-12-17 DIAGNOSIS — I10 HYPERTENSION, ESSENTIAL: ICD-10-CM

## 2020-12-17 DIAGNOSIS — Z00.00 ANNUAL PHYSICAL EXAM: ICD-10-CM

## 2020-12-17 LAB
ALBUMIN SERPL BCP-MCNC: 4.1 G/DL (ref 3.5–5.2)
ALP SERPL-CCNC: 69 U/L (ref 55–135)
ALT SERPL W/O P-5'-P-CCNC: 16 U/L (ref 10–44)
ANION GAP SERPL CALC-SCNC: 7 MMOL/L (ref 8–16)
AST SERPL-CCNC: 24 U/L (ref 10–40)
BASOPHILS # BLD AUTO: 0.05 K/UL (ref 0–0.2)
BASOPHILS NFR BLD: 0.9 % (ref 0–1.9)
BILIRUB SERPL-MCNC: 0.4 MG/DL (ref 0.1–1)
BUN SERPL-MCNC: 19 MG/DL (ref 8–23)
CALCIUM SERPL-MCNC: 9.5 MG/DL (ref 8.7–10.5)
CHLORIDE SERPL-SCNC: 105 MMOL/L (ref 95–110)
CHOLEST SERPL-MCNC: 176 MG/DL (ref 120–199)
CHOLEST/HDLC SERPL: 2.6 {RATIO} (ref 2–5)
CO2 SERPL-SCNC: 29 MMOL/L (ref 23–29)
CREAT SERPL-MCNC: 0.8 MG/DL (ref 0.5–1.4)
DIFFERENTIAL METHOD: ABNORMAL
EOSINOPHIL # BLD AUTO: 0.2 K/UL (ref 0–0.5)
EOSINOPHIL NFR BLD: 3.1 % (ref 0–8)
ERYTHROCYTE [DISTWIDTH] IN BLOOD BY AUTOMATED COUNT: 12 % (ref 11.5–14.5)
EST. GFR  (AFRICAN AMERICAN): >60 ML/MIN/1.73 M^2
EST. GFR  (NON AFRICAN AMERICAN): >60 ML/MIN/1.73 M^2
ESTIMATED AVG GLUCOSE: 117 MG/DL (ref 68–131)
GLUCOSE SERPL-MCNC: 91 MG/DL (ref 70–110)
HBA1C MFR BLD HPLC: 5.7 % (ref 4–5.6)
HCT VFR BLD AUTO: 39.3 % (ref 37–48.5)
HDLC SERPL-MCNC: 67 MG/DL (ref 40–75)
HDLC SERPL: 38.1 % (ref 20–50)
HGB BLD-MCNC: 12.5 G/DL (ref 12–16)
IMM GRANULOCYTES # BLD AUTO: 0.01 K/UL (ref 0–0.04)
IMM GRANULOCYTES NFR BLD AUTO: 0.2 % (ref 0–0.5)
LDLC SERPL CALC-MCNC: 101.4 MG/DL (ref 63–159)
LYMPHOCYTES # BLD AUTO: 0.8 K/UL (ref 1–4.8)
LYMPHOCYTES NFR BLD: 14.1 % (ref 18–48)
MCH RBC QN AUTO: 30.3 PG (ref 27–31)
MCHC RBC AUTO-ENTMCNC: 31.8 G/DL (ref 32–36)
MCV RBC AUTO: 95 FL (ref 82–98)
MONOCYTES # BLD AUTO: 0.4 K/UL (ref 0.3–1)
MONOCYTES NFR BLD: 7.5 % (ref 4–15)
NEUTROPHILS # BLD AUTO: 4.3 K/UL (ref 1.8–7.7)
NEUTROPHILS NFR BLD: 74.2 % (ref 38–73)
NONHDLC SERPL-MCNC: 109 MG/DL
NRBC BLD-RTO: 0 /100 WBC
PLATELET # BLD AUTO: 214 K/UL (ref 150–350)
PMV BLD AUTO: 8.8 FL (ref 9.2–12.9)
POTASSIUM SERPL-SCNC: 4.6 MMOL/L (ref 3.5–5.1)
PROT SERPL-MCNC: 7.2 G/DL (ref 6–8.4)
RBC # BLD AUTO: 4.13 M/UL (ref 4–5.4)
SODIUM SERPL-SCNC: 141 MMOL/L (ref 136–145)
TRIGL SERPL-MCNC: 38 MG/DL (ref 30–150)
TSH SERPL DL<=0.005 MIU/L-ACNC: 2.49 UIU/ML (ref 0.4–4)
WBC # BLD AUTO: 5.74 K/UL (ref 3.9–12.7)

## 2020-12-17 PROCEDURE — 83036 HEMOGLOBIN GLYCOSYLATED A1C: CPT | Mod: HCNC

## 2020-12-17 PROCEDURE — 80061 LIPID PANEL: CPT | Mod: HCNC

## 2020-12-17 PROCEDURE — 84443 ASSAY THYROID STIM HORMONE: CPT | Mod: HCNC

## 2020-12-17 PROCEDURE — 85025 COMPLETE CBC W/AUTO DIFF WBC: CPT | Mod: HCNC

## 2020-12-17 PROCEDURE — 36415 COLL VENOUS BLD VENIPUNCTURE: CPT | Mod: HCNC,PO

## 2020-12-17 PROCEDURE — 80053 COMPREHEN METABOLIC PANEL: CPT | Mod: HCNC

## 2020-12-20 ENCOUNTER — PATIENT MESSAGE (OUTPATIENT)
Dept: FAMILY MEDICINE | Facility: CLINIC | Age: 75
End: 2020-12-20

## 2020-12-23 ENCOUNTER — OFFICE VISIT (OUTPATIENT)
Dept: FAMILY MEDICINE | Facility: CLINIC | Age: 75
End: 2020-12-23
Payer: MEDICARE

## 2020-12-23 VITALS
TEMPERATURE: 98 F | HEIGHT: 59 IN | WEIGHT: 127.31 LBS | BODY MASS INDEX: 25.67 KG/M2 | HEART RATE: 71 BPM | DIASTOLIC BLOOD PRESSURE: 62 MMHG | SYSTOLIC BLOOD PRESSURE: 110 MMHG | RESPIRATION RATE: 18 BRPM

## 2020-12-23 DIAGNOSIS — I65.23 BILATERAL CAROTID ARTERY STENOSIS: ICD-10-CM

## 2020-12-23 DIAGNOSIS — M85.80 OSTEOPENIA, UNSPECIFIED LOCATION: ICD-10-CM

## 2020-12-23 DIAGNOSIS — I10 HYPERTENSION, ESSENTIAL: ICD-10-CM

## 2020-12-23 DIAGNOSIS — R73.03 PREDIABETES: ICD-10-CM

## 2020-12-23 DIAGNOSIS — Z00.00 ANNUAL PHYSICAL EXAM: Primary | ICD-10-CM

## 2020-12-23 DIAGNOSIS — Z78.0 ASYMPTOMATIC MENOPAUSAL STATE: ICD-10-CM

## 2020-12-23 DIAGNOSIS — E78.5 HYPERLIPIDEMIA, UNSPECIFIED HYPERLIPIDEMIA TYPE: ICD-10-CM

## 2020-12-23 PROCEDURE — 3288F PR FALLS RISK ASSESSMENT DOCUMENTED: ICD-10-PCS | Mod: CPTII,S$GLB,, | Performed by: FAMILY MEDICINE

## 2020-12-23 PROCEDURE — 1126F PR PAIN SEVERITY QUANTIFIED, NO PAIN PRESENT: ICD-10-PCS | Mod: S$GLB,,, | Performed by: FAMILY MEDICINE

## 2020-12-23 PROCEDURE — 3074F PR MOST RECENT SYSTOLIC BLOOD PRESSURE < 130 MM HG: ICD-10-PCS | Mod: CPTII,S$GLB,, | Performed by: FAMILY MEDICINE

## 2020-12-23 PROCEDURE — 1100F PTFALLS ASSESS-DOCD GE2>/YR: CPT | Mod: CPTII,S$GLB,, | Performed by: FAMILY MEDICINE

## 2020-12-23 PROCEDURE — 99397 PR PREVENTIVE VISIT,EST,65 & OVER: ICD-10-PCS | Mod: S$GLB,,, | Performed by: FAMILY MEDICINE

## 2020-12-23 PROCEDURE — 1126F AMNT PAIN NOTED NONE PRSNT: CPT | Mod: S$GLB,,, | Performed by: FAMILY MEDICINE

## 2020-12-23 PROCEDURE — 3288F FALL RISK ASSESSMENT DOCD: CPT | Mod: CPTII,S$GLB,, | Performed by: FAMILY MEDICINE

## 2020-12-23 PROCEDURE — 1100F PR PT FALLS ASSESS DOC 2+ FALLS/FALL W/INJURY/YR: ICD-10-PCS | Mod: CPTII,S$GLB,, | Performed by: FAMILY MEDICINE

## 2020-12-23 PROCEDURE — 3008F BODY MASS INDEX DOCD: CPT | Mod: CPTII,S$GLB,, | Performed by: FAMILY MEDICINE

## 2020-12-23 PROCEDURE — 99397 PER PM REEVAL EST PAT 65+ YR: CPT | Mod: S$GLB,,, | Performed by: FAMILY MEDICINE

## 2020-12-23 PROCEDURE — 1157F ADVNC CARE PLAN IN RCRD: CPT | Mod: S$GLB,,, | Performed by: FAMILY MEDICINE

## 2020-12-23 PROCEDURE — 3074F SYST BP LT 130 MM HG: CPT | Mod: CPTII,S$GLB,, | Performed by: FAMILY MEDICINE

## 2020-12-23 PROCEDURE — 3078F PR MOST RECENT DIASTOLIC BLOOD PRESSURE < 80 MM HG: ICD-10-PCS | Mod: CPTII,S$GLB,, | Performed by: FAMILY MEDICINE

## 2020-12-23 PROCEDURE — 3078F DIAST BP <80 MM HG: CPT | Mod: CPTII,S$GLB,, | Performed by: FAMILY MEDICINE

## 2020-12-23 PROCEDURE — 3008F PR BODY MASS INDEX (BMI) DOCUMENTED: ICD-10-PCS | Mod: CPTII,S$GLB,, | Performed by: FAMILY MEDICINE

## 2020-12-23 PROCEDURE — 99499 UNLISTED E&M SERVICE: CPT | Mod: S$GLB,,, | Performed by: FAMILY MEDICINE

## 2020-12-23 PROCEDURE — 99499 RISK ADDL DX/OHS AUDIT: ICD-10-PCS | Mod: S$GLB,,, | Performed by: FAMILY MEDICINE

## 2020-12-23 PROCEDURE — 1157F PR ADVANCE CARE PLAN OR EQUIV PRESENT IN MEDICAL RECORD: ICD-10-PCS | Mod: S$GLB,,, | Performed by: FAMILY MEDICINE

## 2020-12-24 NOTE — PROGRESS NOTES
Subjective:       Patient ID: Cassidy Stanford is a 75 y.o. female.    Chief Complaint: Annual Exam    HPI   The patient is a 75-year-old who is here today for her annual exam.  Overall, she is doing well.  She and her  are staying safe with the COVID pandemic.  She does find that ages is catching up with us.  They are not as quick and are more achy.  She recently had labs done.  She is staying active.  She is trying to consume healthy diet.  She is up-to-date with her mammogram.  She is due for DEXA scan and would be willing to have that done.    Today we discussed the followin)  Hypertension.  She is taking Benicar.  Her blood pressure today is 110/62.  She has not felt as if her blood pressure has been high or low   2)  Hyperlipidemia.  She is taking Pravachol.  Her recent LDL was 101   3)  Pre diabetes.  Her last A1c was 5.7.  She denies any polyuria, polydipsia or polyphasia   4)  Sensitive stomach.  She has noticed that she has had a sensitive stomach over the past year.  She has had 2 big bouts of stomach issues but has not had any recent issues.  When she has her stomach issues, she has mild pain all over her belly and feels nauseated.  She denies any change in her appetite.  She denies any diarrhea or constipation.  She denies any melena or hematochezia.  She has tried to decrease her fried foods, citrus and caffeine and finds that that has helped.  She occasionally has acid reflux but not really.    5) asymmetry.  She notes that the fat rolls on her side  are asymmetric with the right side being bigger than the left side.  She wonders if she has a tumor something else causing this asymmetry.    Review of Systems   Constitutional: Negative for appetite change, chills, diaphoresis, fatigue, fever and unexpected weight change.   HENT: Negative for congestion, dental problem, ear pain, hearing loss, postnasal drip, rhinorrhea, sneezing, sore throat and trouble swallowing.    Eyes: Negative  for photophobia, pain, discharge and visual disturbance.   Respiratory: Negative for cough, chest tightness, shortness of breath and wheezing.    Cardiovascular: Negative for chest pain, palpitations and leg swelling.   Gastrointestinal: Negative for abdominal distention, abdominal pain, blood in stool, constipation, diarrhea, nausea and vomiting.   Endocrine: Negative for cold intolerance, heat intolerance, polydipsia and polyuria.   Genitourinary: Negative for dysuria, flank pain, frequency, genital sores, hematuria, menstrual problem and vaginal discharge.   Musculoskeletal: Negative for arthralgias, joint swelling and myalgias.   Skin: Negative for rash.   Neurological: Negative for dizziness, syncope, light-headedness and headaches.   Hematological: Negative for adenopathy. Does not bruise/bleed easily.   Psychiatric/Behavioral: Negative for dysphoric mood, self-injury, sleep disturbance and suicidal ideas. The patient is not nervous/anxious.        Objective:      Physical Exam  Constitutional:       General: She is not in acute distress.     Appearance: Normal appearance. She is well-developed.   HENT:      Head: Normocephalic and atraumatic.      Right Ear: Hearing, tympanic membrane, ear canal and external ear normal.      Left Ear: Hearing, tympanic membrane, ear canal and external ear normal.      Nose: Nose normal.      Mouth/Throat:      Mouth: No oral lesions.      Pharynx: No oropharyngeal exudate or posterior oropharyngeal erythema.   Eyes:      General: Lids are normal. No scleral icterus.     Extraocular Movements: Extraocular movements intact.      Conjunctiva/sclera: Conjunctivae normal.      Pupils: Pupils are equal, round, and reactive to light.   Neck:      Musculoskeletal: Normal range of motion and neck supple.      Thyroid: No thyroid mass or thyromegaly.      Vascular: No carotid bruit.   Cardiovascular:      Rate and Rhythm: Normal rate and regular rhythm.  No extrasystoles are present.    "  Chest Wall: PMI is not displaced.      Heart sounds: Normal heart sounds. No murmur. No gallop.    Pulmonary:      Effort: Pulmonary effort is normal. No accessory muscle usage or respiratory distress.      Breath sounds: Normal breath sounds.   Abdominal:      General: Bowel sounds are normal. There is no abdominal bruit.      Palpations: Abdomen is soft.      Tenderness: There is no abdominal tenderness. There is no rebound.      Comments: She does have asymmetry involving her mom mid to lower abdomen with the right side appearing larger than the left side   Lymphadenopathy:      Head:      Right side of head: No submental or submandibular adenopathy.      Left side of head: No submental or submandibular adenopathy.      Cervical:      Right cervical: No superficial, deep or posterior cervical adenopathy.     Left cervical: No superficial, deep or posterior cervical adenopathy.      Upper Body:      Right upper body: No supraclavicular adenopathy.      Left upper body: No supraclavicular adenopathy.   Skin:     General: Skin is warm and dry.   Neurological:      Mental Status: She is alert and oriented to person, place, and time.      Cranial Nerves: No cranial nerve deficit.      Sensory: No sensory deficit.   Psychiatric:         Speech: Speech normal.         Behavior: Behavior normal.         Thought Content: Thought content normal.       Blood pressure 110/62, pulse 71, temperature 97.5 °F (36.4 °C), temperature source Temporal, resp. rate 18, height 4' 11" (1.499 m), weight 57.7 kg (127 lb 5.1 oz).Body mass index is 25.71 kg/m².        A/P:  1) annual exam.  Health maintenance issues and anticipatory guidance issues were discussed.  Immunizations are up-to-date.  We will check fasting labs in 1 year.  She will continue to be physically active and consume healthy diet.  2) hypertension.  Well controlled.  Continue with Benicar  3) pre diabetes.  Well controlled.  Continue with lifestyle interventions  4) " abdominal pain.  Intermittent.  If she has recurrent symptoms, she may try a 2 week course of Prilosec.  If her symptoms recur and do not improve with the Prilosec or if they worsen, she will let me know          5) asymmetry of the abdomen.  New.  We will check an ultrasound   6)  History of carotid artery disease.  Status unknown.  We will check a carotid ultrasound  7) osteopenia.  Status unknown.  We will check a DEXA scan

## 2020-12-31 ENCOUNTER — HOSPITAL ENCOUNTER (OUTPATIENT)
Dept: RADIOLOGY | Facility: HOSPITAL | Age: 75
Discharge: HOME | End: 2020-12-31
Attending: FAMILY MEDICINE
Payer: MEDICARE

## 2020-12-31 DIAGNOSIS — M85.80 OSTEOPENIA, UNSPECIFIED LOCATION: ICD-10-CM

## 2020-12-31 DIAGNOSIS — I65.23 BILATERAL CAROTID ARTERY STENOSIS: ICD-10-CM

## 2020-12-31 DIAGNOSIS — Z78.0 ASYMPTOMATIC MENOPAUSAL STATE: ICD-10-CM

## 2020-12-31 DIAGNOSIS — E78.5 HYPERLIPIDEMIA, UNSPECIFIED HYPERLIPIDEMIA TYPE: ICD-10-CM

## 2020-12-31 PROCEDURE — 93880 EXTRACRANIAL BILAT STUDY: CPT | Mod: TC,HCNC,PO

## 2020-12-31 PROCEDURE — 76700 US EXAM ABDOM COMPLETE: CPT | Mod: TC,HCNC,PO

## 2020-12-31 PROCEDURE — 93880 US CAROTID BILATERAL: ICD-10-PCS | Mod: 26,HCNC,, | Performed by: RADIOLOGY

## 2020-12-31 PROCEDURE — 77080 DXA BONE DENSITY AXIAL: CPT | Mod: TC,HCNC,PO

## 2020-12-31 PROCEDURE — 77080 DXA BONE DENSITY AXIAL: CPT | Mod: 26,HCNC,, | Performed by: RADIOLOGY

## 2020-12-31 PROCEDURE — 76700 US EXAM ABDOM COMPLETE: CPT | Mod: 26,HCNC,, | Performed by: RADIOLOGY

## 2020-12-31 PROCEDURE — 77080 DEXA BONE DENSITY SPINE HIP: ICD-10-PCS | Mod: 26,HCNC,, | Performed by: RADIOLOGY

## 2020-12-31 PROCEDURE — 76700 US ABDOMEN COMPLETE: ICD-10-PCS | Mod: 26,HCNC,, | Performed by: RADIOLOGY

## 2020-12-31 PROCEDURE — 93880 EXTRACRANIAL BILAT STUDY: CPT | Mod: 26,HCNC,, | Performed by: RADIOLOGY

## 2021-01-01 ENCOUNTER — PATIENT MESSAGE (OUTPATIENT)
Dept: FAMILY MEDICINE | Facility: CLINIC | Age: 76
End: 2021-01-01

## 2021-01-09 ENCOUNTER — IMMUNIZATION (OUTPATIENT)
Dept: PRIMARY CARE CLINIC | Facility: CLINIC | Age: 76
End: 2021-01-09
Payer: MEDICARE

## 2021-01-09 DIAGNOSIS — Z23 NEED FOR VACCINATION: ICD-10-CM

## 2021-01-09 PROCEDURE — 0001A COVID-19, MRNA, LNP-S, PF, 30 MCG/0.3 ML DOSE VACCINE: CPT | Mod: S$GLB,,, | Performed by: FAMILY MEDICINE

## 2021-01-09 PROCEDURE — 0001A COVID-19, MRNA, LNP-S, PF, 30 MCG/0.3 ML DOSE VACCINE: ICD-10-PCS | Mod: S$GLB,,, | Performed by: FAMILY MEDICINE

## 2021-01-09 PROCEDURE — 91300 COVID-19, MRNA, LNP-S, PF, 30 MCG/0.3 ML DOSE VACCINE: CPT | Mod: S$GLB,,, | Performed by: FAMILY MEDICINE

## 2021-01-09 PROCEDURE — 91300 COVID-19, MRNA, LNP-S, PF, 30 MCG/0.3 ML DOSE VACCINE: ICD-10-PCS | Mod: S$GLB,,, | Performed by: FAMILY MEDICINE

## 2021-01-30 ENCOUNTER — IMMUNIZATION (OUTPATIENT)
Dept: PRIMARY CARE CLINIC | Facility: CLINIC | Age: 76
End: 2021-01-30
Payer: MEDICARE

## 2021-01-30 DIAGNOSIS — Z23 NEED FOR VACCINATION: Primary | ICD-10-CM

## 2021-01-30 PROCEDURE — 0002A COVID-19, MRNA, LNP-S, PF, 30 MCG/0.3 ML DOSE VACCINE: CPT | Mod: S$GLB,,, | Performed by: FAMILY MEDICINE

## 2021-01-30 PROCEDURE — 91300 COVID-19, MRNA, LNP-S, PF, 30 MCG/0.3 ML DOSE VACCINE: CPT | Mod: S$GLB,,, | Performed by: FAMILY MEDICINE

## 2021-01-30 PROCEDURE — 91300 COVID-19, MRNA, LNP-S, PF, 30 MCG/0.3 ML DOSE VACCINE: ICD-10-PCS | Mod: S$GLB,,, | Performed by: FAMILY MEDICINE

## 2021-01-30 PROCEDURE — 0002A COVID-19, MRNA, LNP-S, PF, 30 MCG/0.3 ML DOSE VACCINE: ICD-10-PCS | Mod: S$GLB,,, | Performed by: FAMILY MEDICINE

## 2021-02-02 ENCOUNTER — OFFICE VISIT (OUTPATIENT)
Dept: FAMILY MEDICINE | Facility: CLINIC | Age: 76
End: 2021-02-02
Payer: MEDICARE

## 2021-02-02 DIAGNOSIS — M81.0 OSTEOPOROSIS, UNSPECIFIED OSTEOPOROSIS TYPE, UNSPECIFIED PATHOLOGICAL FRACTURE PRESENCE: Primary | ICD-10-CM

## 2021-02-02 PROCEDURE — 1157F PR ADVANCE CARE PLAN OR EQUIV PRESENT IN MEDICAL RECORD: ICD-10-PCS | Mod: 95,,, | Performed by: FAMILY MEDICINE

## 2021-02-02 PROCEDURE — 1159F PR MEDICATION LIST DOCUMENTED IN MEDICAL RECORD: ICD-10-PCS | Mod: 95,,, | Performed by: FAMILY MEDICINE

## 2021-02-02 PROCEDURE — 1157F ADVNC CARE PLAN IN RCRD: CPT | Mod: 95,,, | Performed by: FAMILY MEDICINE

## 2021-02-02 PROCEDURE — 99499 UNLISTED E&M SERVICE: CPT | Mod: 95,,, | Performed by: FAMILY MEDICINE

## 2021-02-02 PROCEDURE — 99213 PR OFFICE/OUTPT VISIT, EST, LEVL III, 20-29 MIN: ICD-10-PCS | Mod: 95,,, | Performed by: FAMILY MEDICINE

## 2021-02-02 PROCEDURE — 99213 OFFICE O/P EST LOW 20 MIN: CPT | Mod: 95,,, | Performed by: FAMILY MEDICINE

## 2021-02-02 PROCEDURE — 99499 RISK ADDL DX/OHS AUDIT: ICD-10-PCS | Mod: 95,,, | Performed by: FAMILY MEDICINE

## 2021-02-02 PROCEDURE — 1159F MED LIST DOCD IN RCRD: CPT | Mod: 95,,, | Performed by: FAMILY MEDICINE

## 2021-02-02 RX ORDER — MONTELUKAST SODIUM 10 MG/1
10 TABLET ORAL NIGHTLY
Qty: 90 TABLET | Refills: 3 | Status: SHIPPED | OUTPATIENT
Start: 2021-02-02 | End: 2021-11-23

## 2021-02-20 DIAGNOSIS — E78.5 HYPERLIPIDEMIA: Chronic | ICD-10-CM

## 2021-02-23 RX ORDER — PRAVASTATIN SODIUM 20 MG/1
20 TABLET ORAL NIGHTLY
Qty: 90 TABLET | Refills: 4 | Status: SHIPPED | OUTPATIENT
Start: 2021-02-23 | End: 2022-01-03

## 2021-03-05 ENCOUNTER — OFFICE VISIT (OUTPATIENT)
Dept: FAMILY MEDICINE | Facility: CLINIC | Age: 76
End: 2021-03-05
Payer: MEDICARE

## 2021-03-05 VITALS
WEIGHT: 129 LBS | DIASTOLIC BLOOD PRESSURE: 84 MMHG | HEART RATE: 62 BPM | BODY MASS INDEX: 26 KG/M2 | OXYGEN SATURATION: 98 % | SYSTOLIC BLOOD PRESSURE: 122 MMHG | HEIGHT: 59 IN

## 2021-03-05 DIAGNOSIS — I10 ESSENTIAL HYPERTENSION: Chronic | ICD-10-CM

## 2021-03-05 DIAGNOSIS — J44.9 CHRONIC OBSTRUCTIVE PULMONARY DISEASE, UNSPECIFIED COPD TYPE: ICD-10-CM

## 2021-03-05 DIAGNOSIS — Z00.00 ENCOUNTER FOR PREVENTIVE HEALTH EXAMINATION: Primary | ICD-10-CM

## 2021-03-05 DIAGNOSIS — I65.23 BILATERAL CAROTID ARTERY STENOSIS: ICD-10-CM

## 2021-03-05 PROCEDURE — 3008F BODY MASS INDEX DOCD: CPT | Mod: CPTII,S$GLB,, | Performed by: NURSE PRACTITIONER

## 2021-03-05 PROCEDURE — 3079F DIAST BP 80-89 MM HG: CPT | Mod: CPTII,S$GLB,, | Performed by: NURSE PRACTITIONER

## 2021-03-05 PROCEDURE — 3074F PR MOST RECENT SYSTOLIC BLOOD PRESSURE < 130 MM HG: ICD-10-PCS | Mod: CPTII,S$GLB,, | Performed by: NURSE PRACTITIONER

## 2021-03-05 PROCEDURE — 1123F PR ADV CARE PLAN DISCUSSED, PLAN OR SURROGATE DOCUMENTED: ICD-10-PCS | Mod: S$GLB,,, | Performed by: NURSE PRACTITIONER

## 2021-03-05 PROCEDURE — G0439 PR MEDICARE ANNUAL WELLNESS SUBSEQUENT VISIT: ICD-10-PCS | Mod: S$GLB,,, | Performed by: NURSE PRACTITIONER

## 2021-03-05 PROCEDURE — 3288F PR FALLS RISK ASSESSMENT DOCUMENTED: ICD-10-PCS | Mod: CPTII,S$GLB,, | Performed by: NURSE PRACTITIONER

## 2021-03-05 PROCEDURE — 1126F AMNT PAIN NOTED NONE PRSNT: CPT | Mod: S$GLB,,, | Performed by: NURSE PRACTITIONER

## 2021-03-05 PROCEDURE — 99499 RISK ADDL DX/OHS AUDIT: ICD-10-PCS | Mod: HCNC,S$GLB,, | Performed by: NURSE PRACTITIONER

## 2021-03-05 PROCEDURE — 99999 PR PBB SHADOW E&M-EST. PATIENT-LVL IV: ICD-10-PCS | Mod: PBBFAC,,, | Performed by: NURSE PRACTITIONER

## 2021-03-05 PROCEDURE — 1101F PR PT FALLS ASSESS DOC 0-1 FALLS W/OUT INJ PAST YR: ICD-10-PCS | Mod: CPTII,S$GLB,, | Performed by: NURSE PRACTITIONER

## 2021-03-05 PROCEDURE — 3288F FALL RISK ASSESSMENT DOCD: CPT | Mod: CPTII,S$GLB,, | Performed by: NURSE PRACTITIONER

## 2021-03-05 PROCEDURE — 3074F SYST BP LT 130 MM HG: CPT | Mod: CPTII,S$GLB,, | Performed by: NURSE PRACTITIONER

## 2021-03-05 PROCEDURE — 3008F PR BODY MASS INDEX (BMI) DOCUMENTED: ICD-10-PCS | Mod: CPTII,S$GLB,, | Performed by: NURSE PRACTITIONER

## 2021-03-05 PROCEDURE — 1126F PR PAIN SEVERITY QUANTIFIED, NO PAIN PRESENT: ICD-10-PCS | Mod: S$GLB,,, | Performed by: NURSE PRACTITIONER

## 2021-03-05 PROCEDURE — 99999 PR PBB SHADOW E&M-EST. PATIENT-LVL IV: CPT | Mod: PBBFAC,,, | Performed by: NURSE PRACTITIONER

## 2021-03-05 PROCEDURE — 1123F ACP DISCUSS/DSCN MKR DOCD: CPT | Mod: S$GLB,,, | Performed by: NURSE PRACTITIONER

## 2021-03-05 PROCEDURE — 99499 UNLISTED E&M SERVICE: CPT | Mod: HCNC,S$GLB,, | Performed by: NURSE PRACTITIONER

## 2021-03-05 PROCEDURE — G0439 PPPS, SUBSEQ VISIT: HCPCS | Mod: S$GLB,,, | Performed by: NURSE PRACTITIONER

## 2021-03-05 PROCEDURE — 1101F PT FALLS ASSESS-DOCD LE1/YR: CPT | Mod: CPTII,S$GLB,, | Performed by: NURSE PRACTITIONER

## 2021-03-05 PROCEDURE — 3079F PR MOST RECENT DIASTOLIC BLOOD PRESSURE 80-89 MM HG: ICD-10-PCS | Mod: CPTII,S$GLB,, | Performed by: NURSE PRACTITIONER

## 2021-03-05 RX ORDER — PETROLATUM,WHITE/LANOLIN
OINTMENT (GRAM) TOPICAL
COMMUNITY
End: 2021-03-12 | Stop reason: ALTCHOICE

## 2021-06-07 ENCOUNTER — OFFICE VISIT (OUTPATIENT)
Dept: OPTOMETRY | Facility: CLINIC | Age: 76
End: 2021-06-07
Payer: MEDICARE

## 2021-06-07 DIAGNOSIS — Z13.5 GLAUCOMA SCREENING: ICD-10-CM

## 2021-06-07 DIAGNOSIS — H25.13 NUCLEAR SCLEROSIS, BILATERAL: Primary | ICD-10-CM

## 2021-06-07 DIAGNOSIS — H52.203 MYOPIA WITH ASTIGMATISM AND PRESBYOPIA, BILATERAL: ICD-10-CM

## 2021-06-07 DIAGNOSIS — H43.813 POSTERIOR VITREOUS DETACHMENT, BILATERAL: ICD-10-CM

## 2021-06-07 DIAGNOSIS — H52.13 MYOPIA WITH ASTIGMATISM AND PRESBYOPIA, BILATERAL: ICD-10-CM

## 2021-06-07 DIAGNOSIS — H52.4 MYOPIA WITH ASTIGMATISM AND PRESBYOPIA, BILATERAL: ICD-10-CM

## 2021-06-07 PROCEDURE — 92004 PR EYE EXAM, NEW PATIENT,COMPREHESV: ICD-10-PCS | Mod: HCNC,S$GLB,, | Performed by: OPTOMETRIST

## 2021-06-07 PROCEDURE — 99499 RISK ADDL DX/OHS AUDIT: ICD-10-PCS | Mod: HCNC,S$GLB,, | Performed by: OPTOMETRIST

## 2021-06-07 PROCEDURE — 1126F PR PAIN SEVERITY QUANTIFIED, NO PAIN PRESENT: ICD-10-PCS | Mod: HCNC,S$GLB,, | Performed by: OPTOMETRIST

## 2021-06-07 PROCEDURE — 1157F ADVNC CARE PLAN IN RCRD: CPT | Mod: HCNC,S$GLB,, | Performed by: OPTOMETRIST

## 2021-06-07 PROCEDURE — 99999 PR PBB SHADOW E&M-EST. PATIENT-LVL III: CPT | Mod: PBBFAC,HCNC,, | Performed by: OPTOMETRIST

## 2021-06-07 PROCEDURE — 92004 COMPRE OPH EXAM NEW PT 1/>: CPT | Mod: HCNC,S$GLB,, | Performed by: OPTOMETRIST

## 2021-06-07 PROCEDURE — 1157F PR ADVANCE CARE PLAN OR EQUIV PRESENT IN MEDICAL RECORD: ICD-10-PCS | Mod: HCNC,S$GLB,, | Performed by: OPTOMETRIST

## 2021-06-07 PROCEDURE — 92015 PR REFRACTION: ICD-10-PCS | Mod: HCNC,S$GLB,, | Performed by: OPTOMETRIST

## 2021-06-07 PROCEDURE — 1126F AMNT PAIN NOTED NONE PRSNT: CPT | Mod: HCNC,S$GLB,, | Performed by: OPTOMETRIST

## 2021-06-07 PROCEDURE — 99999 PR PBB SHADOW E&M-EST. PATIENT-LVL III: ICD-10-PCS | Mod: PBBFAC,HCNC,, | Performed by: OPTOMETRIST

## 2021-06-07 PROCEDURE — 92015 DETERMINE REFRACTIVE STATE: CPT | Mod: HCNC,S$GLB,, | Performed by: OPTOMETRIST

## 2021-06-07 PROCEDURE — 99499 UNLISTED E&M SERVICE: CPT | Mod: HCNC,S$GLB,, | Performed by: OPTOMETRIST

## 2021-09-16 ENCOUNTER — HOSPITAL ENCOUNTER (OUTPATIENT)
Dept: RADIOLOGY | Facility: HOSPITAL | Age: 76
Discharge: HOME | End: 2021-09-16
Attending: FAMILY MEDICINE
Payer: MEDICARE

## 2021-09-16 DIAGNOSIS — Z12.31 ENCOUNTER FOR SCREENING MAMMOGRAM FOR MALIGNANT NEOPLASM OF BREAST: ICD-10-CM

## 2021-09-16 PROCEDURE — 77063 BREAST TOMOSYNTHESIS BI: CPT | Mod: 26,HCNC,, | Performed by: RADIOLOGY

## 2021-09-16 PROCEDURE — 77063 MAMMO DIGITAL SCREENING BILAT WITH TOMO: ICD-10-PCS | Mod: 26,HCNC,, | Performed by: RADIOLOGY

## 2021-09-16 PROCEDURE — 77067 SCR MAMMO BI INCL CAD: CPT | Mod: 26,HCNC,, | Performed by: RADIOLOGY

## 2021-09-16 PROCEDURE — 77067 SCR MAMMO BI INCL CAD: CPT | Mod: TC,HCNC,PO

## 2021-09-16 PROCEDURE — 77067 MAMMO DIGITAL SCREENING BILAT WITH TOMO: ICD-10-PCS | Mod: 26,HCNC,, | Performed by: RADIOLOGY

## 2021-09-17 ENCOUNTER — PATIENT MESSAGE (OUTPATIENT)
Dept: FAMILY MEDICINE | Facility: CLINIC | Age: 76
End: 2021-09-17

## 2021-09-23 ENCOUNTER — IMMUNIZATION (OUTPATIENT)
Dept: FAMILY MEDICINE | Facility: CLINIC | Age: 76
End: 2021-09-23
Payer: MEDICARE

## 2021-09-23 DIAGNOSIS — Z23 NEED FOR VACCINATION: Primary | ICD-10-CM

## 2021-09-23 PROCEDURE — 0003A COVID-19, MRNA, LNP-S, PF, 30 MCG/0.3 ML DOSE VACCINE: CPT | Mod: HCNC,PBBFAC | Performed by: FAMILY MEDICINE

## 2021-09-23 PROCEDURE — 91300 COVID-19, MRNA, LNP-S, PF, 30 MCG/0.3 ML DOSE VACCINE: CPT | Mod: HCNC,PBBFAC | Performed by: FAMILY MEDICINE

## 2021-12-01 ENCOUNTER — TELEPHONE (OUTPATIENT)
Dept: FAMILY MEDICINE | Facility: CLINIC | Age: 76
End: 2021-12-01
Payer: MEDICARE

## 2021-12-13 ENCOUNTER — PATIENT MESSAGE (OUTPATIENT)
Dept: FAMILY MEDICINE | Facility: CLINIC | Age: 76
End: 2021-12-13
Payer: MEDICARE

## 2021-12-14 RX ORDER — FLUTICASONE FUROATE AND VILANTEROL TRIFENATATE 200; 25 UG/1; UG/1
1 POWDER RESPIRATORY (INHALATION) DAILY
Qty: 3 EACH | Refills: 3 | Status: SHIPPED | OUTPATIENT
Start: 2021-12-14 | End: 2022-02-17

## 2021-12-17 ENCOUNTER — PES CALL (OUTPATIENT)
Dept: ADMINISTRATIVE | Facility: CLINIC | Age: 76
End: 2021-12-17
Payer: MEDICARE

## 2021-12-31 DIAGNOSIS — E78.5 HYPERLIPIDEMIA: Chronic | ICD-10-CM

## 2022-01-03 RX ORDER — PRAVASTATIN SODIUM 20 MG/1
20 TABLET ORAL NIGHTLY
Qty: 90 TABLET | Refills: 4 | Status: SHIPPED | OUTPATIENT
Start: 2022-01-03 | End: 2023-11-09 | Stop reason: SDUPTHER

## 2022-01-14 ENCOUNTER — PATIENT MESSAGE (OUTPATIENT)
Dept: FAMILY MEDICINE | Facility: CLINIC | Age: 77
End: 2022-01-14
Payer: MEDICARE

## 2022-01-14 DIAGNOSIS — R73.03 PREDIABETES: ICD-10-CM

## 2022-01-14 DIAGNOSIS — M85.80 OSTEOPENIA, UNSPECIFIED LOCATION: ICD-10-CM

## 2022-01-14 DIAGNOSIS — Z00.00 ANNUAL PHYSICAL EXAM: Primary | ICD-10-CM

## 2022-01-14 DIAGNOSIS — E78.5 HYPERLIPIDEMIA, UNSPECIFIED HYPERLIPIDEMIA TYPE: ICD-10-CM

## 2022-01-18 ENCOUNTER — TELEPHONE (OUTPATIENT)
Dept: FAMILY MEDICINE | Facility: CLINIC | Age: 77
End: 2022-01-18
Payer: MEDICARE

## 2022-01-18 DIAGNOSIS — M85.80 OSTEOPENIA, UNSPECIFIED LOCATION: Primary | ICD-10-CM

## 2022-01-19 ENCOUNTER — LAB VISIT (OUTPATIENT)
Dept: LAB | Facility: HOSPITAL | Age: 77
End: 2022-01-19
Attending: FAMILY MEDICINE
Payer: MEDICARE

## 2022-01-19 DIAGNOSIS — E78.5 HYPERLIPIDEMIA, UNSPECIFIED HYPERLIPIDEMIA TYPE: ICD-10-CM

## 2022-01-19 DIAGNOSIS — M85.80 OSTEOPENIA, UNSPECIFIED LOCATION: ICD-10-CM

## 2022-01-19 DIAGNOSIS — R73.03 PREDIABETES: ICD-10-CM

## 2022-01-19 DIAGNOSIS — Z00.00 ANNUAL PHYSICAL EXAM: ICD-10-CM

## 2022-01-19 LAB
ALBUMIN SERPL BCP-MCNC: 4 G/DL (ref 3.5–5.2)
ALP SERPL-CCNC: 59 U/L (ref 55–135)
ALT SERPL W/O P-5'-P-CCNC: 16 U/L (ref 10–44)
ANION GAP SERPL CALC-SCNC: 9 MMOL/L (ref 8–16)
AST SERPL-CCNC: 21 U/L (ref 10–40)
BASOPHILS # BLD AUTO: 0.05 K/UL (ref 0–0.2)
BASOPHILS NFR BLD: 1.1 % (ref 0–1.9)
BILIRUB SERPL-MCNC: 0.4 MG/DL (ref 0.1–1)
BUN SERPL-MCNC: 15 MG/DL (ref 8–23)
CALCIUM SERPL-MCNC: 9.6 MG/DL (ref 8.7–10.5)
CHLORIDE SERPL-SCNC: 105 MMOL/L (ref 95–110)
CHOLEST SERPL-MCNC: 168 MG/DL (ref 120–199)
CHOLEST/HDLC SERPL: 2.5 {RATIO} (ref 2–5)
CO2 SERPL-SCNC: 25 MMOL/L (ref 23–29)
CREAT SERPL-MCNC: 0.7 MG/DL (ref 0.5–1.4)
DIFFERENTIAL METHOD: ABNORMAL
EOSINOPHIL # BLD AUTO: 0.2 K/UL (ref 0–0.5)
EOSINOPHIL NFR BLD: 4.3 % (ref 0–8)
ERYTHROCYTE [DISTWIDTH] IN BLOOD BY AUTOMATED COUNT: 12 % (ref 11.5–14.5)
EST. GFR  (AFRICAN AMERICAN): >60 ML/MIN/1.73 M^2
EST. GFR  (NON AFRICAN AMERICAN): >60 ML/MIN/1.73 M^2
ESTIMATED AVG GLUCOSE: 120 MG/DL (ref 68–131)
GLUCOSE SERPL-MCNC: 85 MG/DL (ref 70–110)
HBA1C MFR BLD: 5.8 % (ref 4–5.6)
HCT VFR BLD AUTO: 39.7 % (ref 37–48.5)
HDLC SERPL-MCNC: 66 MG/DL (ref 40–75)
HDLC SERPL: 39.3 % (ref 20–50)
HGB BLD-MCNC: 13.1 G/DL (ref 12–16)
IMM GRANULOCYTES # BLD AUTO: 0.02 K/UL (ref 0–0.04)
IMM GRANULOCYTES NFR BLD AUTO: 0.4 % (ref 0–0.5)
LDLC SERPL CALC-MCNC: 94.2 MG/DL (ref 63–159)
LYMPHOCYTES # BLD AUTO: 0.9 K/UL (ref 1–4.8)
LYMPHOCYTES NFR BLD: 19.1 % (ref 18–48)
MCH RBC QN AUTO: 31 PG (ref 27–31)
MCHC RBC AUTO-ENTMCNC: 33 G/DL (ref 32–36)
MCV RBC AUTO: 94 FL (ref 82–98)
MONOCYTES # BLD AUTO: 0.4 K/UL (ref 0.3–1)
MONOCYTES NFR BLD: 8.1 % (ref 4–15)
NEUTROPHILS # BLD AUTO: 3 K/UL (ref 1.8–7.7)
NEUTROPHILS NFR BLD: 67 % (ref 38–73)
NONHDLC SERPL-MCNC: 102 MG/DL
NRBC BLD-RTO: 0 /100 WBC
PLATELET # BLD AUTO: 221 K/UL (ref 150–450)
PMV BLD AUTO: 8.8 FL (ref 9.2–12.9)
POTASSIUM SERPL-SCNC: 4.3 MMOL/L (ref 3.5–5.1)
PROT SERPL-MCNC: 6.8 G/DL (ref 6–8.4)
RBC # BLD AUTO: 4.23 M/UL (ref 4–5.4)
SODIUM SERPL-SCNC: 139 MMOL/L (ref 136–145)
TRIGL SERPL-MCNC: 39 MG/DL (ref 30–150)
TSH SERPL DL<=0.005 MIU/L-ACNC: 3.44 UIU/ML (ref 0.4–4)
WBC # BLD AUTO: 4.45 K/UL (ref 3.9–12.7)

## 2022-01-19 PROCEDURE — 84443 ASSAY THYROID STIM HORMONE: CPT | Mod: HCNC | Performed by: FAMILY MEDICINE

## 2022-01-19 PROCEDURE — 85025 COMPLETE CBC W/AUTO DIFF WBC: CPT | Mod: HCNC | Performed by: FAMILY MEDICINE

## 2022-01-19 PROCEDURE — 83036 HEMOGLOBIN GLYCOSYLATED A1C: CPT | Mod: HCNC | Performed by: FAMILY MEDICINE

## 2022-01-19 PROCEDURE — 80061 LIPID PANEL: CPT | Mod: HCNC | Performed by: FAMILY MEDICINE

## 2022-01-19 PROCEDURE — 36415 COLL VENOUS BLD VENIPUNCTURE: CPT | Mod: HCNC,PO | Performed by: FAMILY MEDICINE

## 2022-01-19 PROCEDURE — 80053 COMPREHEN METABOLIC PANEL: CPT | Mod: HCNC | Performed by: FAMILY MEDICINE

## 2022-01-21 ENCOUNTER — PATIENT MESSAGE (OUTPATIENT)
Dept: FAMILY MEDICINE | Facility: CLINIC | Age: 77
End: 2022-01-21
Payer: MEDICARE

## 2022-02-01 ENCOUNTER — OFFICE VISIT (OUTPATIENT)
Dept: FAMILY MEDICINE | Facility: CLINIC | Age: 77
End: 2022-02-01
Payer: MEDICARE

## 2022-02-01 VITALS
HEART RATE: 64 BPM | TEMPERATURE: 98 F | DIASTOLIC BLOOD PRESSURE: 66 MMHG | OXYGEN SATURATION: 97 % | HEIGHT: 59 IN | RESPIRATION RATE: 16 BRPM | BODY MASS INDEX: 25.55 KG/M2 | SYSTOLIC BLOOD PRESSURE: 114 MMHG | WEIGHT: 126.75 LBS

## 2022-02-01 DIAGNOSIS — Z00.00 ANNUAL PHYSICAL EXAM: Primary | ICD-10-CM

## 2022-02-01 DIAGNOSIS — E78.5 HYPERLIPIDEMIA, UNSPECIFIED HYPERLIPIDEMIA TYPE: ICD-10-CM

## 2022-02-01 DIAGNOSIS — J44.9 CHRONIC OBSTRUCTIVE PULMONARY DISEASE, UNSPECIFIED COPD TYPE: ICD-10-CM

## 2022-02-01 DIAGNOSIS — R73.03 PREDIABETES: ICD-10-CM

## 2022-02-01 PROCEDURE — 99397 PR PREVENTIVE VISIT,EST,65 & OVER: ICD-10-PCS | Mod: S$GLB,,, | Performed by: FAMILY MEDICINE

## 2022-02-01 PROCEDURE — 3074F PR MOST RECENT SYSTOLIC BLOOD PRESSURE < 130 MM HG: ICD-10-PCS | Mod: CPTII,S$GLB,, | Performed by: FAMILY MEDICINE

## 2022-02-01 PROCEDURE — 1101F PR PT FALLS ASSESS DOC 0-1 FALLS W/OUT INJ PAST YR: ICD-10-PCS | Mod: CPTII,S$GLB,, | Performed by: FAMILY MEDICINE

## 2022-02-01 PROCEDURE — 1160F PR REVIEW ALL MEDS BY PRESCRIBER/CLIN PHARMACIST DOCUMENTED: ICD-10-PCS | Mod: CPTII,S$GLB,, | Performed by: FAMILY MEDICINE

## 2022-02-01 PROCEDURE — 3078F DIAST BP <80 MM HG: CPT | Mod: CPTII,S$GLB,, | Performed by: FAMILY MEDICINE

## 2022-02-01 PROCEDURE — 3078F PR MOST RECENT DIASTOLIC BLOOD PRESSURE < 80 MM HG: ICD-10-PCS | Mod: CPTII,S$GLB,, | Performed by: FAMILY MEDICINE

## 2022-02-01 PROCEDURE — 1126F AMNT PAIN NOTED NONE PRSNT: CPT | Mod: CPTII,S$GLB,, | Performed by: FAMILY MEDICINE

## 2022-02-01 PROCEDURE — 1159F MED LIST DOCD IN RCRD: CPT | Mod: CPTII,S$GLB,, | Performed by: FAMILY MEDICINE

## 2022-02-01 PROCEDURE — 1157F ADVNC CARE PLAN IN RCRD: CPT | Mod: CPTII,S$GLB,, | Performed by: FAMILY MEDICINE

## 2022-02-01 PROCEDURE — 1101F PT FALLS ASSESS-DOCD LE1/YR: CPT | Mod: CPTII,S$GLB,, | Performed by: FAMILY MEDICINE

## 2022-02-01 PROCEDURE — 1157F PR ADVANCE CARE PLAN OR EQUIV PRESENT IN MEDICAL RECORD: ICD-10-PCS | Mod: CPTII,S$GLB,, | Performed by: FAMILY MEDICINE

## 2022-02-01 PROCEDURE — 99397 PER PM REEVAL EST PAT 65+ YR: CPT | Mod: S$GLB,,, | Performed by: FAMILY MEDICINE

## 2022-02-01 PROCEDURE — 1126F PR PAIN SEVERITY QUANTIFIED, NO PAIN PRESENT: ICD-10-PCS | Mod: CPTII,S$GLB,, | Performed by: FAMILY MEDICINE

## 2022-02-01 PROCEDURE — 1159F PR MEDICATION LIST DOCUMENTED IN MEDICAL RECORD: ICD-10-PCS | Mod: CPTII,S$GLB,, | Performed by: FAMILY MEDICINE

## 2022-02-01 PROCEDURE — 99499 UNLISTED E&M SERVICE: CPT | Mod: S$GLB,,, | Performed by: FAMILY MEDICINE

## 2022-02-01 PROCEDURE — 3044F PR MOST RECENT HEMOGLOBIN A1C LEVEL <7.0%: ICD-10-PCS | Mod: CPTII,S$GLB,, | Performed by: FAMILY MEDICINE

## 2022-02-01 PROCEDURE — 3044F HG A1C LEVEL LT 7.0%: CPT | Mod: CPTII,S$GLB,, | Performed by: FAMILY MEDICINE

## 2022-02-01 PROCEDURE — 3288F FALL RISK ASSESSMENT DOCD: CPT | Mod: CPTII,S$GLB,, | Performed by: FAMILY MEDICINE

## 2022-02-01 PROCEDURE — 3288F PR FALLS RISK ASSESSMENT DOCUMENTED: ICD-10-PCS | Mod: CPTII,S$GLB,, | Performed by: FAMILY MEDICINE

## 2022-02-01 PROCEDURE — 1160F RVW MEDS BY RX/DR IN RCRD: CPT | Mod: CPTII,S$GLB,, | Performed by: FAMILY MEDICINE

## 2022-02-01 PROCEDURE — 3074F SYST BP LT 130 MM HG: CPT | Mod: CPTII,S$GLB,, | Performed by: FAMILY MEDICINE

## 2022-02-01 PROCEDURE — 99499 RISK ADDL DX/OHS AUDIT: ICD-10-PCS | Mod: S$GLB,,, | Performed by: FAMILY MEDICINE

## 2022-02-13 NOTE — PROGRESS NOTES
Subjective:       Patient ID: Cassidy Stanford is a 76 y.o. female.    Chief Complaint: Annual Exam    HPI   The patient is a 76-year-old who is here today for her annual exam.  Overall, she is doing pretty well.  She is going to the gym 5 days a week.  She is is consuming a healthy diet.  She is staying busy with volunteer work and traveling to see her sons and their family in [place] and [place].  She recently had labs which reviewed today.  She is due for her mammogram and DEXA scan later this year    Today we also discussed the followin) pre diabetes.  Her recent A1c was 5.8 which is similar to prior values.  She does not have any polyuria, polydipsia or polyphasia  2) chronic cough with asthma and moderate to severe COPD.  She continues to be bothered by her chronic cough.  Her prior pulmonologist has retired and she has an appointment with a new pulmonologist soon.  She does take Breo consistently  3) hypertension.  She is doing well with Benicar.  Her blood pressure today is 114/66 4. Hyperlipidemia.  She is taking her Pravachol consistently.  Her recent LDL was 94      Review of Systems   Constitutional: Negative for activity change, appetite change, chills, diaphoresis, fatigue, fever and unexpected weight change.   HENT: Negative for congestion, dental problem, ear pain, hearing loss, postnasal drip, rhinorrhea, sinus pressure, sneezing, sore throat and trouble swallowing.    Eyes: Negative for photophobia, pain, discharge and visual disturbance.   Respiratory: Positive for cough. Negative for chest tightness, shortness of breath and wheezing.    Cardiovascular: Negative for chest pain, palpitations and leg swelling.   Gastrointestinal: Negative for abdominal distention, abdominal pain, blood in stool, constipation, diarrhea, nausea and vomiting.   Endocrine: Negative for cold intolerance, heat intolerance, polydipsia and polyuria.   Genitourinary: Negative for difficulty urinating, dysuria,  flank pain, frequency, genital sores, hematuria, menstrual problem and vaginal discharge.   Musculoskeletal: Negative for arthralgias, joint swelling, myalgias and neck pain.   Skin: Negative for rash.   Neurological: Negative for dizziness, syncope, weakness, light-headedness and headaches.   Hematological: Negative for adenopathy. Does not bruise/bleed easily.   Psychiatric/Behavioral: Negative for confusion, dysphoric mood, self-injury, sleep disturbance and suicidal ideas. The patient is not nervous/anxious.        Objective:      Physical Exam  Constitutional:       General: She is not in acute distress.     Appearance: Normal appearance. She is well-developed.   HENT:      Head: Normocephalic and atraumatic.      Right Ear: Hearing, tympanic membrane, ear canal and external ear normal.      Left Ear: Hearing, tympanic membrane, ear canal and external ear normal.      Nose: Nose normal.      Mouth/Throat:      Mouth: No oral lesions.      Pharynx: No oropharyngeal exudate or posterior oropharyngeal erythema.   Eyes:      General: Lids are normal. No scleral icterus.     Extraocular Movements: Extraocular movements intact.      Conjunctiva/sclera: Conjunctivae normal.      Pupils: Pupils are equal, round, and reactive to light.   Neck:      Thyroid: No thyroid mass or thyromegaly.      Vascular: No carotid bruit.   Cardiovascular:      Rate and Rhythm: Normal rate and regular rhythm.  No extrasystoles are present.     Chest Wall: PMI is not displaced.      Heart sounds: Normal heart sounds. No murmur heard.  No gallop.    Pulmonary:      Effort: Pulmonary effort is normal. No accessory muscle usage or respiratory distress.      Breath sounds: Normal breath sounds.   Chest:   Breasts:      Right: No supraclavicular adenopathy.      Left: No supraclavicular adenopathy.       Abdominal:      General: Bowel sounds are normal. There is no abdominal bruit.      Palpations: Abdomen is soft.      Tenderness: There is no  "abdominal tenderness. There is no rebound.   Musculoskeletal:      Cervical back: Normal range of motion and neck supple.   Lymphadenopathy:      Head:      Right side of head: No submental or submandibular adenopathy.      Left side of head: No submental or submandibular adenopathy.      Cervical:      Right cervical: No superficial, deep or posterior cervical adenopathy.     Left cervical: No superficial, deep or posterior cervical adenopathy.      Upper Body:      Right upper body: No supraclavicular adenopathy.      Left upper body: No supraclavicular adenopathy.   Skin:     General: Skin is warm and dry.   Neurological:      Mental Status: She is alert and oriented to person, place, and time.      Cranial Nerves: No cranial nerve deficit.      Sensory: No sensory deficit.   Psychiatric:         Speech: Speech normal.         Behavior: Behavior normal.         Thought Content: Thought content normal.       Blood pressure 114/66, pulse 64, temperature 98.4 °F (36.9 °C), temperature source Temporal, resp. rate 16, height 4' 11" (1.499 m), weight 57.5 kg (126 lb 12.2 oz), SpO2 97 %.Body mass index is 25.6 kg/m².          A/P:  1) annual exam.  Health maintenance issues and anticipatory guidance issues were discussed.  Immunizations are up-to-date.  We will check a DEXA scan with her mammogram this year  2) pre diabetes.  Stable.  We will recheck this in 1 year   3) asthma and moderate to severe COPD.  Stable.  Continue with Breo   4) hypertension.  Well controlled.  Continue with Benicar  5) Hyperlipidemia.  Well controlled.  Continue with Pravachol   6) carotid artery disease.  Asymptomatic.  We will repeat a carotid ultrasound at the end of this year  7) gallbladder polyp.  We will repeat a ultrasound at the end of this year    "

## 2022-02-17 ENCOUNTER — OFFICE VISIT (OUTPATIENT)
Dept: PULMONOLOGY | Facility: CLINIC | Age: 77
End: 2022-02-17
Payer: MEDICARE

## 2022-02-17 VITALS
DIASTOLIC BLOOD PRESSURE: 52 MMHG | HEIGHT: 59 IN | WEIGHT: 132.38 LBS | BODY MASS INDEX: 26.69 KG/M2 | RESPIRATION RATE: 18 BRPM | OXYGEN SATURATION: 99 % | HEART RATE: 64 BPM | SYSTOLIC BLOOD PRESSURE: 103 MMHG

## 2022-02-17 DIAGNOSIS — J44.9 CHRONIC OBSTRUCTIVE PULMONARY DISEASE, UNSPECIFIED COPD TYPE: Primary | ICD-10-CM

## 2022-02-17 DIAGNOSIS — J45.40 MODERATE PERSISTENT ASTHMA WITHOUT COMPLICATION: ICD-10-CM

## 2022-02-17 PROCEDURE — 99999 PR PBB SHADOW E&M-EST. PATIENT-LVL IV: CPT | Mod: PBBFAC,HCNC,, | Performed by: INTERNAL MEDICINE

## 2022-02-17 PROCEDURE — 3078F DIAST BP <80 MM HG: CPT | Mod: HCNC,CPTII,S$GLB, | Performed by: INTERNAL MEDICINE

## 2022-02-17 PROCEDURE — 1126F AMNT PAIN NOTED NONE PRSNT: CPT | Mod: HCNC,CPTII,S$GLB, | Performed by: INTERNAL MEDICINE

## 2022-02-17 PROCEDURE — 1101F PT FALLS ASSESS-DOCD LE1/YR: CPT | Mod: HCNC,CPTII,S$GLB, | Performed by: INTERNAL MEDICINE

## 2022-02-17 PROCEDURE — 3078F PR MOST RECENT DIASTOLIC BLOOD PRESSURE < 80 MM HG: ICD-10-PCS | Mod: HCNC,CPTII,S$GLB, | Performed by: INTERNAL MEDICINE

## 2022-02-17 PROCEDURE — 3044F HG A1C LEVEL LT 7.0%: CPT | Mod: HCNC,CPTII,S$GLB, | Performed by: INTERNAL MEDICINE

## 2022-02-17 PROCEDURE — 3288F FALL RISK ASSESSMENT DOCD: CPT | Mod: HCNC,CPTII,S$GLB, | Performed by: INTERNAL MEDICINE

## 2022-02-17 PROCEDURE — 1157F ADVNC CARE PLAN IN RCRD: CPT | Mod: HCNC,CPTII,S$GLB, | Performed by: INTERNAL MEDICINE

## 2022-02-17 PROCEDURE — 99999 PR PBB SHADOW E&M-EST. PATIENT-LVL IV: ICD-10-PCS | Mod: PBBFAC,HCNC,, | Performed by: INTERNAL MEDICINE

## 2022-02-17 PROCEDURE — 1157F PR ADVANCE CARE PLAN OR EQUIV PRESENT IN MEDICAL RECORD: ICD-10-PCS | Mod: HCNC,CPTII,S$GLB, | Performed by: INTERNAL MEDICINE

## 2022-02-17 PROCEDURE — 99204 PR OFFICE/OUTPT VISIT, NEW, LEVL IV, 45-59 MIN: ICD-10-PCS | Mod: HCNC,S$GLB,, | Performed by: INTERNAL MEDICINE

## 2022-02-17 PROCEDURE — 3074F SYST BP LT 130 MM HG: CPT | Mod: HCNC,CPTII,S$GLB, | Performed by: INTERNAL MEDICINE

## 2022-02-17 PROCEDURE — 1159F MED LIST DOCD IN RCRD: CPT | Mod: HCNC,CPTII,S$GLB, | Performed by: INTERNAL MEDICINE

## 2022-02-17 PROCEDURE — 3044F PR MOST RECENT HEMOGLOBIN A1C LEVEL <7.0%: ICD-10-PCS | Mod: HCNC,CPTII,S$GLB, | Performed by: INTERNAL MEDICINE

## 2022-02-17 PROCEDURE — 99204 OFFICE O/P NEW MOD 45 MIN: CPT | Mod: HCNC,S$GLB,, | Performed by: INTERNAL MEDICINE

## 2022-02-17 PROCEDURE — 1159F PR MEDICATION LIST DOCUMENTED IN MEDICAL RECORD: ICD-10-PCS | Mod: HCNC,CPTII,S$GLB, | Performed by: INTERNAL MEDICINE

## 2022-02-17 PROCEDURE — 1101F PR PT FALLS ASSESS DOC 0-1 FALLS W/OUT INJ PAST YR: ICD-10-PCS | Mod: HCNC,CPTII,S$GLB, | Performed by: INTERNAL MEDICINE

## 2022-02-17 PROCEDURE — 3074F PR MOST RECENT SYSTOLIC BLOOD PRESSURE < 130 MM HG: ICD-10-PCS | Mod: HCNC,CPTII,S$GLB, | Performed by: INTERNAL MEDICINE

## 2022-02-17 PROCEDURE — 1126F PR PAIN SEVERITY QUANTIFIED, NO PAIN PRESENT: ICD-10-PCS | Mod: HCNC,CPTII,S$GLB, | Performed by: INTERNAL MEDICINE

## 2022-02-17 PROCEDURE — 3288F PR FALLS RISK ASSESSMENT DOCUMENTED: ICD-10-PCS | Mod: HCNC,CPTII,S$GLB, | Performed by: INTERNAL MEDICINE

## 2022-02-17 RX ORDER — ALBUTEROL SULFATE 90 UG/1
1-2 AEROSOL, METERED RESPIRATORY (INHALATION) EVERY 4 HOURS PRN
Qty: 18 G | Refills: 11 | Status: SHIPPED | OUTPATIENT
Start: 2022-02-17 | End: 2023-02-13 | Stop reason: SDUPTHER

## 2022-02-17 RX ORDER — ALBUTEROL SULFATE 90 UG/1
1-2 AEROSOL, METERED RESPIRATORY (INHALATION) EVERY 4 HOURS PRN
Qty: 18 G | Refills: 11 | Status: SHIPPED | OUTPATIENT
Start: 2022-02-17 | End: 2022-02-17

## 2022-02-17 RX ORDER — FLUTICASONE FUROATE, UMECLIDINIUM BROMIDE AND VILANTEROL TRIFENATATE 200; 62.5; 25 UG/1; UG/1; UG/1
1 POWDER RESPIRATORY (INHALATION) DAILY
Qty: 60 EACH | Refills: 11 | Status: SHIPPED | OUTPATIENT
Start: 2022-02-17 | End: 2022-05-09

## 2022-02-17 NOTE — PROGRESS NOTES
2/17/2022    Cassidy Stanford  New Patient Consult    Chief Complaint   Patient presents with    Establish Care     New patient; has dx of asthma and copd; never smoker; has seen pulmo before    Asthma    COPD       HPI: Pt is a 75 yo female with osteoporosis, allergies and asthma (COPD?- no pft available)establishing care. Dr. Holalnd's notes reviewed- last visit 6/18/21.  Used to use incruse  Advair in past  Current regimen breo inhaler daily, singulair in evening. Hardly ever uses albuterol. Denies exacerbations  Never uses nebulizer any more- few yrs ago was much worse and then got better controlled.  Coughs a lot- lots of mucous early am white/pale yellow, gradually decreases through the day. Wheezes sometimes. Exercises every am- elliptical 2 mi then weights, no SOB.  Childhood asthma, then came back in 2016.  Mother and father smoked in the house- lots of secondhand smoke but she never smoked.  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  Gets allregies- triggers dust and yard work. She gets out of the house while  cleans house due to severe dust allergy. Used to have cat and felt allergic to cat. No pets now.    The chief complaint problem is new to me    PFSH:  Past Medical History:   Diagnosis Date    Allergy     Arthritis     Asthma     Carotid arterial disease     next us due 12/22    COPD (chronic obstructive pulmonary disease)     moderate to severe on PFTs 2/16 (exposure to parental smoke only) (AAT neg)    Gallbladder polyp     repeat us 12/22    H/O cardiovascular stress test     normal May 2017    Hyperlipidemia     Osteopenia     dexa 7/14 and 5/17    Osteoporosis     dexa 12/20    Prediabetes          Past Surgical History:   Procedure Laterality Date    BREAST BIOPSY Left 2003    core, benign    R knee scope      2013    skin grafts x 2       Social History     Tobacco Use    Smoking status: Never Smoker    Smokeless tobacco: Never Used   Substance Use  "Topics    Alcohol use: Yes     Alcohol/week: 0.0 standard drinks     Comment: occasional    Drug use: No     Family History   Problem Relation Age of Onset    Cancer Mother 80        colon    Heart attack Father 66        MI SCD    Hypertension Sister     Glaucoma Neg Hx      Review of patient's allergies indicates:   Allergen Reactions    House dust mite     Penicillins      Other reaction(s): Rash    Sulfa (sulfonamide antibiotics) Swelling     Other reaction(s): Unknown       Performance Status:The patient's activity level is regular exercise.      Review of Systems:  a review of eleven systems covering constitutional, Eye, HEENT, Psych, Respiratory, Cardiac, GI, , Musculoskeletal, Endocrine, Dermatologic was negative except for pertinent findings as listed ABOVE and below:  All negative with pertinent positives as above        Exam:Comprehensive exam done. BP (!) 103/52 (BP Location: Left arm, Patient Position: Sitting, BP Method: Large (Automatic))   Pulse 64   Resp 18   Ht 4' 11" (1.499 m)   Wt 60.1 kg (132 lb 6.2 oz)   SpO2 99%   BMI 26.74 kg/m²   Exam included Vitals as listed, and patient's appearance and affect and alertness and mood, oral exam for yeast and hygiene and pharynx lesions and Mallapatti (M) score, neck with inspection for jvd and masses and thyroid abnormalities and lymph nodes (supraclavicular and infraclavicular nodes and axillary also examined and noted if abn), chest exam included symmetry and effort and fremitus and percussion and auscultation, cardiac exam included rhythm and gallops and murmur and rubs and jvd and edema, abdominal exam for mass and hepatosplenomegaly and tenderness and hernias and bowel sounds, Musculoskeletal exam with muscle tone and posture and mobility/gait and  strength, and skin for rashes and cyanosis and pallor and turgor, extremity for clubbing.  Findings were normal except for pertinent findings listed below:  Bilateral diffuse expiratory " wheezes  No clubbing/edema    Radiographs (ct chest and cxr) reviewed: view by direct vision   CXR 11/2020- clear lungs    Labs reviewed    Results for CASSIDY GEORGE (MRN [id number]) as of 2/17/2022 09:31   Ref. Range 11/28/2017 08:02 11/28/2018 07:37 12/7/2019 07:46 12/17/2020 08:06 1/19/2022 07:28   Eos # Latest Ref Range: 0.0 - 0.5 K/uL 0.2 0.3 0.4 0.2 0.2     Results for CASSIDY GEORGE (MRN [id number]) as of 2/17/2022 09:31   Ref. Range 2/8/2016 11:29   IgG Latest Ref Range: 650 - 1600 mg/dL 938   IgM Latest Ref Range: 50 - 300 mg/dL 70   IgA Latest Ref Range: 40 - 350 mg/dL 155   IgE Latest Ref Range: 0 - 100 IU/mL 122 (H)   IgG 1 Latest Ref Range: 490 - 1140 mg/dL 508   IgG 2 Latest Ref Range: 150 - 640 mg/dL 321   IgG 3 Latest Ref Range: 11 - 85 mg/dL 32   IgG 4 Latest Ref Range: 3 - 201 mg/dL 31       PFT will be done and results to be reviewed      Plan:  Clinical impression is apparently straight forward and impression with management as below. COPD/asthma, establishing care. adjust control regimen    Cassidy was seen today for establish care, asthma and copd.    Diagnoses and all orders for this visit:    Chronic obstructive pulmonary disease, unspecified COPD type  -     Complete PFT with bronchodilator; Future  -     fluticasone-umeclidin-vilanter (TRELEGY ELLIPTA) 200-62.5-25 mcg inhaler; Inhale 1 puff into the lungs once daily.  -     Discontinue: albuterol (PROVENTIL/VENTOLIN HFA) 90 mcg/actuation inhaler; Inhale 1-2 puffs into the lungs every 4 (four) hours as needed for Shortness of Breath (coughing). Rescue  PLACE ON HOLD  -     albuterol (PROVENTIL/VENTOLIN HFA) 90 mcg/actuation inhaler; Inhale 1-2 puffs into the lungs every 4 (four) hours as needed for Shortness of Breath (coughing).    Moderate persistent asthma without complication  -     Discontinue: albuterol (PROVENTIL/VENTOLIN HFA) 90 mcg/actuation inhaler; Inhale 1-2 puffs into the lungs every 4 (four) hours as needed for Shortness of Breath  (coughing). Rescue  PLACE ON HOLD  -     albuterol (PROVENTIL/VENTOLIN HFA) 90 mcg/actuation inhaler; Inhale 1-2 puffs into the lungs every 4 (four) hours as needed for Shortness of Breath (coughing).        Follow up in about 6 months (around 8/17/2022).    Discussed with patient above for education the following:      Patient Instructions   Stop breo and start trelegy for asthma- one puff daily, rinse mouth after use  Cough suspect due to bad COPD/asthma, will have to manage it and may get better with stronger medicine  Get PFT  Consider trial of stopping benicar 2 wks to see if the cough gets better

## 2022-02-17 NOTE — PATIENT INSTRUCTIONS
Stop breo and start trelegy for asthma- one puff daily, rinse mouth after use  Cough suspect due to bad COPD/asthma, will have to manage it and may get better with stronger medicine  Get PFT  Consider trial of stopping benicar 2 wks to see if the cough gets better

## 2022-02-18 ENCOUNTER — OFFICE VISIT (OUTPATIENT)
Dept: FAMILY MEDICINE | Facility: CLINIC | Age: 77
End: 2022-02-18
Payer: MEDICARE

## 2022-02-18 VITALS
WEIGHT: 130.5 LBS | SYSTOLIC BLOOD PRESSURE: 130 MMHG | HEART RATE: 70 BPM | DIASTOLIC BLOOD PRESSURE: 68 MMHG | BODY MASS INDEX: 26.31 KG/M2 | OXYGEN SATURATION: 97 % | HEIGHT: 59 IN

## 2022-02-18 DIAGNOSIS — I10 ESSENTIAL HYPERTENSION: Chronic | ICD-10-CM

## 2022-02-18 DIAGNOSIS — J44.9 CHRONIC OBSTRUCTIVE PULMONARY DISEASE, UNSPECIFIED COPD TYPE: ICD-10-CM

## 2022-02-18 DIAGNOSIS — I65.23 BILATERAL CAROTID ARTERY STENOSIS: ICD-10-CM

## 2022-02-18 DIAGNOSIS — Z00.00 ENCOUNTER FOR PREVENTIVE HEALTH EXAMINATION: Primary | ICD-10-CM

## 2022-02-18 PROCEDURE — 1126F PR PAIN SEVERITY QUANTIFIED, NO PAIN PRESENT: ICD-10-PCS | Mod: HCNC,CPTII,S$GLB, | Performed by: NURSE PRACTITIONER

## 2022-02-18 PROCEDURE — 1126F AMNT PAIN NOTED NONE PRSNT: CPT | Mod: HCNC,CPTII,S$GLB, | Performed by: NURSE PRACTITIONER

## 2022-02-18 PROCEDURE — 3078F PR MOST RECENT DIASTOLIC BLOOD PRESSURE < 80 MM HG: ICD-10-PCS | Mod: HCNC,CPTII,S$GLB, | Performed by: NURSE PRACTITIONER

## 2022-02-18 PROCEDURE — G0439 PPPS, SUBSEQ VISIT: HCPCS | Mod: HCNC,S$GLB,, | Performed by: NURSE PRACTITIONER

## 2022-02-18 PROCEDURE — 1101F PT FALLS ASSESS-DOCD LE1/YR: CPT | Mod: HCNC,CPTII,S$GLB, | Performed by: NURSE PRACTITIONER

## 2022-02-18 PROCEDURE — 1157F PR ADVANCE CARE PLAN OR EQUIV PRESENT IN MEDICAL RECORD: ICD-10-PCS | Mod: HCNC,CPTII,S$GLB, | Performed by: NURSE PRACTITIONER

## 2022-02-18 PROCEDURE — 1157F ADVNC CARE PLAN IN RCRD: CPT | Mod: HCNC,CPTII,S$GLB, | Performed by: NURSE PRACTITIONER

## 2022-02-18 PROCEDURE — 3075F SYST BP GE 130 - 139MM HG: CPT | Mod: HCNC,CPTII,S$GLB, | Performed by: NURSE PRACTITIONER

## 2022-02-18 PROCEDURE — 3075F PR MOST RECENT SYSTOLIC BLOOD PRESS GE 130-139MM HG: ICD-10-PCS | Mod: HCNC,CPTII,S$GLB, | Performed by: NURSE PRACTITIONER

## 2022-02-18 PROCEDURE — 1160F PR REVIEW ALL MEDS BY PRESCRIBER/CLIN PHARMACIST DOCUMENTED: ICD-10-PCS | Mod: HCNC,CPTII,S$GLB, | Performed by: NURSE PRACTITIONER

## 2022-02-18 PROCEDURE — G0439 PR MEDICARE ANNUAL WELLNESS SUBSEQUENT VISIT: ICD-10-PCS | Mod: HCNC,S$GLB,, | Performed by: NURSE PRACTITIONER

## 2022-02-18 PROCEDURE — 3044F HG A1C LEVEL LT 7.0%: CPT | Mod: HCNC,CPTII,S$GLB, | Performed by: NURSE PRACTITIONER

## 2022-02-18 PROCEDURE — 3288F FALL RISK ASSESSMENT DOCD: CPT | Mod: HCNC,CPTII,S$GLB, | Performed by: NURSE PRACTITIONER

## 2022-02-18 PROCEDURE — 1101F PR PT FALLS ASSESS DOC 0-1 FALLS W/OUT INJ PAST YR: ICD-10-PCS | Mod: HCNC,CPTII,S$GLB, | Performed by: NURSE PRACTITIONER

## 2022-02-18 PROCEDURE — 3288F PR FALLS RISK ASSESSMENT DOCUMENTED: ICD-10-PCS | Mod: HCNC,CPTII,S$GLB, | Performed by: NURSE PRACTITIONER

## 2022-02-18 PROCEDURE — 99999 PR PBB SHADOW E&M-EST. PATIENT-LVL IV: ICD-10-PCS | Mod: PBBFAC,HCNC,, | Performed by: NURSE PRACTITIONER

## 2022-02-18 PROCEDURE — 1160F RVW MEDS BY RX/DR IN RCRD: CPT | Mod: HCNC,CPTII,S$GLB, | Performed by: NURSE PRACTITIONER

## 2022-02-18 PROCEDURE — 1159F PR MEDICATION LIST DOCUMENTED IN MEDICAL RECORD: ICD-10-PCS | Mod: HCNC,CPTII,S$GLB, | Performed by: NURSE PRACTITIONER

## 2022-02-18 PROCEDURE — 3078F DIAST BP <80 MM HG: CPT | Mod: HCNC,CPTII,S$GLB, | Performed by: NURSE PRACTITIONER

## 2022-02-18 PROCEDURE — 3044F PR MOST RECENT HEMOGLOBIN A1C LEVEL <7.0%: ICD-10-PCS | Mod: HCNC,CPTII,S$GLB, | Performed by: NURSE PRACTITIONER

## 2022-02-18 PROCEDURE — 1159F MED LIST DOCD IN RCRD: CPT | Mod: HCNC,CPTII,S$GLB, | Performed by: NURSE PRACTITIONER

## 2022-02-18 PROCEDURE — 99999 PR PBB SHADOW E&M-EST. PATIENT-LVL IV: CPT | Mod: PBBFAC,HCNC,, | Performed by: NURSE PRACTITIONER

## 2022-02-18 NOTE — PATIENT INSTRUCTIONS
Counseling and Referral of Other Preventative  (Italic type indicates deductible and co-insurance are waived)    Patient Name: Cassidy Stanford  Today's Date: 2/18/2022    Health Maintenance       Date Due Completion Date    High Dose Statin Never done ---    Mammogram 09/16/2022 9/16/2021    Lipid Panel 01/19/2023 1/19/2022    Aspirin/Antiplatelet Therapy 02/17/2023 2/17/2022    DEXA SCAN 12/31/2023 12/31/2020    TETANUS VACCINE 10/05/2025 10/5/2015    Colorectal Cancer Screening 10/25/2026 10/25/2021    Override on 11/10/2011: Done        No orders of the defined types were placed in this encounter.    The following information is provided to all patients.  This information is to help you find resources for any of the problems found today that may be affecting your health:                Living healthy guide: www.[facility].[place].gov      Understanding Diabetes: www.diabetes.org      Eating healthy: www.cdc.gov/healthyweight      [facility] home safety checklist: www.cdc.gov/steadi/patient.html      Agency on Aging: www.goea.[place].[facility]      Alcoholics anonymous (AA): www.aa.org      Physical Activity: www.nii.nih.gov/cr9xtpi      Tobacco use: www.quitwithusla.org

## 2022-02-18 NOTE — PROGRESS NOTES
"  Cassidy Stanford presented for a  Medicare AWV and comprehensive Health Risk Assessment today. The following components were reviewed and updated:    · Medical history  · Family History  · Social history  · Allergies and Current Medications  · Health Risk Assessment  · Health Maintenance  · Care Team         ** See Completed Assessments for Annual Wellness Visit within the encounter summary.**         The following assessments were completed:  · Living Situation  · CAGE  · Depression Screening  · Timed Get Up and Go  · Whisper Test  · Cognitive Function Screening          · Nutrition Screening  · ADL Screening  · PAQ Screening        Vitals:    02/18/22 0713   BP: 130/68   BP Location: Left arm   Patient Position: Sitting   BP Method: Medium (Manual)   Pulse: 70   SpO2: 97%   Weight: 59.2 kg (130 lb 8.2 oz)   Height: 4' 11" (1.499 m)     Body mass index is 26.36 kg/m².  Physical Exam  Vitals reviewed.   Constitutional:       General: She is not in acute distress.     Appearance: She is normal weight.   HENT:      Head: Normocephalic.   Pulmonary:      Effort: Pulmonary effort is normal. No respiratory distress.   Skin:     General: Skin is warm.   Neurological:      General: No focal deficit present.      Mental Status: She is alert.   Psychiatric:         Mood and Affect: Mood normal.         Thought Content: Thought content normal.               Diagnoses and health risks identified today and associated recommendations/orders:    1. Encounter for preventive health examination  Reviewed health maintenance and provided recommendations   All health maintenance is UTD     2. Chronic obstructive pulmonary disease, unspecified COPD type  Continue to monitor  Followed by [place].      3. Essential hypertension  Continue to monitor  Followed by Rahda Danielle MD .      4. Bilateral carotid artery stenosis  Continue to monitor  Followed by Radha Danielle MD   Taking statin.        Provided Cassidy with a 5-10 year written " screening schedule and personal prevention plan. Recommendations were developed using the USPSTF age appropriate recommendations. Education, counseling, and referrals were provided as needed. After Visit Summary printed and given to patient which includes a list of additional screenings\tests needed.    Follow up in one year    Esthela Clayton NP  I offered to discuss advanced care planning, including how to pick a person who would make decisions for you if you were unable to make them for yourself, called a health care power of , and what kind of decisions you might make such as use of life sustaining treatments such as ventilators and tube feeding when faced with a life limiting illness recorded on a living will that they will need to know. (How you want to be cared for as you near the end of your natural life)     X  Patient has advanced directives on file, which we reviewed, and they do not wish to make changes.

## 2022-02-20 ENCOUNTER — PATIENT MESSAGE (OUTPATIENT)
Dept: PULMONOLOGY | Facility: CLINIC | Age: 77
End: 2022-02-20
Payer: MEDICARE

## 2022-02-21 DIAGNOSIS — J44.9 CHRONIC OBSTRUCTIVE PULMONARY DISEASE, UNSPECIFIED COPD TYPE: Primary | ICD-10-CM

## 2022-02-21 RX ORDER — UMECLIDINIUM 62.5 UG/1
62.5 AEROSOL, POWDER ORAL DAILY
Qty: 30 EACH | Refills: 0 | Status: SHIPPED | OUTPATIENT
Start: 2022-02-21 | End: 2022-05-09 | Stop reason: SDUPTHER

## 2022-02-21 RX ORDER — FLUTICASONE FUROATE AND VILANTEROL TRIFENATATE 200; 25 UG/1; UG/1
1 POWDER RESPIRATORY (INHALATION) DAILY
Qty: 60 EACH | Refills: 11 | Status: SHIPPED | OUTPATIENT
Start: 2022-02-21 | End: 2022-08-18

## 2022-02-23 ENCOUNTER — PATIENT MESSAGE (OUTPATIENT)
Dept: PULMONOLOGY | Facility: CLINIC | Age: 77
End: 2022-02-23
Payer: MEDICARE

## 2022-03-03 ENCOUNTER — PATIENT MESSAGE (OUTPATIENT)
Dept: PULMONOLOGY | Facility: CLINIC | Age: 77
End: 2022-03-03
Payer: MEDICARE

## 2022-05-07 ENCOUNTER — PATIENT MESSAGE (OUTPATIENT)
Dept: PULMONOLOGY | Facility: CLINIC | Age: 77
End: 2022-05-07
Payer: MEDICARE

## 2022-05-09 ENCOUNTER — PATIENT MESSAGE (OUTPATIENT)
Dept: SMOKING CESSATION | Facility: CLINIC | Age: 77
End: 2022-05-09
Payer: MEDICARE

## 2022-05-09 ENCOUNTER — PATIENT MESSAGE (OUTPATIENT)
Dept: PULMONOLOGY | Facility: CLINIC | Age: 77
End: 2022-05-09
Payer: MEDICARE

## 2022-05-09 DIAGNOSIS — J44.9 CHRONIC OBSTRUCTIVE PULMONARY DISEASE, UNSPECIFIED COPD TYPE: ICD-10-CM

## 2022-05-09 RX ORDER — UMECLIDINIUM 62.5 UG/1
62.5 AEROSOL, POWDER ORAL DAILY
Qty: 30 EACH | Refills: 5 | Status: SHIPPED | OUTPATIENT
Start: 2022-05-09 | End: 2022-08-18

## 2022-05-09 NOTE — TELEPHONE ENCOUNTER
Patient would like incruse to be refilled and trelegy discontinued. She states this is working well for her.

## 2022-05-17 ENCOUNTER — PATIENT MESSAGE (OUTPATIENT)
Dept: PULMONOLOGY | Facility: CLINIC | Age: 77
End: 2022-05-17
Payer: MEDICARE

## 2022-05-18 ENCOUNTER — PATIENT MESSAGE (OUTPATIENT)
Dept: PULMONOLOGY | Facility: CLINIC | Age: 77
End: 2022-05-18
Payer: MEDICARE

## 2022-05-18 ENCOUNTER — TELEPHONE (OUTPATIENT)
Dept: PULMONOLOGY | Facility: CLINIC | Age: 77
End: 2022-05-18
Payer: MEDICARE

## 2022-05-18 NOTE — TELEPHONE ENCOUNTER
autorization approved    -----------------------------------------------------------------      Submitted prior authorization for patient to get Incruse inhaler.

## 2022-06-07 ENCOUNTER — OFFICE VISIT (OUTPATIENT)
Dept: OPTOMETRY | Facility: CLINIC | Age: 77
End: 2022-06-07
Payer: MEDICARE

## 2022-06-07 DIAGNOSIS — H52.203 MYOPIA WITH ASTIGMATISM AND PRESBYOPIA, BILATERAL: ICD-10-CM

## 2022-06-07 DIAGNOSIS — Z13.5 GLAUCOMA SCREENING: ICD-10-CM

## 2022-06-07 DIAGNOSIS — H43.813 POSTERIOR VITREOUS DETACHMENT, BILATERAL: ICD-10-CM

## 2022-06-07 DIAGNOSIS — H25.13 NUCLEAR SCLEROSIS, BILATERAL: Primary | ICD-10-CM

## 2022-06-07 DIAGNOSIS — H52.13 MYOPIA WITH ASTIGMATISM AND PRESBYOPIA, BILATERAL: ICD-10-CM

## 2022-06-07 DIAGNOSIS — H52.4 MYOPIA WITH ASTIGMATISM AND PRESBYOPIA, BILATERAL: ICD-10-CM

## 2022-06-07 PROCEDURE — 3288F PR FALLS RISK ASSESSMENT DOCUMENTED: ICD-10-PCS | Mod: CPTII,S$GLB,, | Performed by: OPTOMETRIST

## 2022-06-07 PROCEDURE — 1157F ADVNC CARE PLAN IN RCRD: CPT | Mod: CPTII,S$GLB,, | Performed by: OPTOMETRIST

## 2022-06-07 PROCEDURE — 99999 PR PBB SHADOW E&M-EST. PATIENT-LVL III: CPT | Mod: PBBFAC,,, | Performed by: OPTOMETRIST

## 2022-06-07 PROCEDURE — 92015 PR REFRACTION: ICD-10-PCS | Mod: S$GLB,,, | Performed by: OPTOMETRIST

## 2022-06-07 PROCEDURE — 1126F AMNT PAIN NOTED NONE PRSNT: CPT | Mod: CPTII,S$GLB,, | Performed by: OPTOMETRIST

## 2022-06-07 PROCEDURE — 92015 DETERMINE REFRACTIVE STATE: CPT | Mod: S$GLB,,, | Performed by: OPTOMETRIST

## 2022-06-07 PROCEDURE — 3044F HG A1C LEVEL LT 7.0%: CPT | Mod: CPTII,S$GLB,, | Performed by: OPTOMETRIST

## 2022-06-07 PROCEDURE — 1101F PT FALLS ASSESS-DOCD LE1/YR: CPT | Mod: CPTII,S$GLB,, | Performed by: OPTOMETRIST

## 2022-06-07 PROCEDURE — 3044F PR MOST RECENT HEMOGLOBIN A1C LEVEL <7.0%: ICD-10-PCS | Mod: CPTII,S$GLB,, | Performed by: OPTOMETRIST

## 2022-06-07 PROCEDURE — 1160F RVW MEDS BY RX/DR IN RCRD: CPT | Mod: CPTII,S$GLB,, | Performed by: OPTOMETRIST

## 2022-06-07 PROCEDURE — 3288F FALL RISK ASSESSMENT DOCD: CPT | Mod: CPTII,S$GLB,, | Performed by: OPTOMETRIST

## 2022-06-07 PROCEDURE — 99999 PR PBB SHADOW E&M-EST. PATIENT-LVL III: ICD-10-PCS | Mod: PBBFAC,,, | Performed by: OPTOMETRIST

## 2022-06-07 PROCEDURE — 1126F PR PAIN SEVERITY QUANTIFIED, NO PAIN PRESENT: ICD-10-PCS | Mod: CPTII,S$GLB,, | Performed by: OPTOMETRIST

## 2022-06-07 PROCEDURE — 92014 PR EYE EXAM, EST PATIENT,COMPREHESV: ICD-10-PCS | Mod: S$GLB,,, | Performed by: OPTOMETRIST

## 2022-06-07 PROCEDURE — 1159F PR MEDICATION LIST DOCUMENTED IN MEDICAL RECORD: ICD-10-PCS | Mod: CPTII,S$GLB,, | Performed by: OPTOMETRIST

## 2022-06-07 PROCEDURE — 1159F MED LIST DOCD IN RCRD: CPT | Mod: CPTII,S$GLB,, | Performed by: OPTOMETRIST

## 2022-06-07 PROCEDURE — 1157F PR ADVANCE CARE PLAN OR EQUIV PRESENT IN MEDICAL RECORD: ICD-10-PCS | Mod: CPTII,S$GLB,, | Performed by: OPTOMETRIST

## 2022-06-07 PROCEDURE — 1160F PR REVIEW ALL MEDS BY PRESCRIBER/CLIN PHARMACIST DOCUMENTED: ICD-10-PCS | Mod: CPTII,S$GLB,, | Performed by: OPTOMETRIST

## 2022-06-07 PROCEDURE — 1101F PR PT FALLS ASSESS DOC 0-1 FALLS W/OUT INJ PAST YR: ICD-10-PCS | Mod: CPTII,S$GLB,, | Performed by: OPTOMETRIST

## 2022-06-07 PROCEDURE — 92014 COMPRE OPH EXAM EST PT 1/>: CPT | Mod: S$GLB,,, | Performed by: OPTOMETRIST

## 2022-06-07 NOTE — PROGRESS NOTES
HPI     Pt here for annual eye exam. LDE- 06/07/2021    Pt sts [facility] O[facility] is stable with current glasses. Pt sts she only uses them for   driving. Pt denies roxanna of gtt. Pt denies flashes/floaters/pain.     Last edited by Nerissa Roa on 6/7/2022 10:56 AM. (History)            Assessment /Plan     For exam results, see Encounter Report.    Nuclear sclerosis, bilateral    Posterior vitreous detachment, bilateral    Glaucoma screening    Myopia with astigmatism and presbyopia, bilateral        1. Vis sig NS w/ slight shift --OD>OS  Not ready for consult   Cautions driving, ana at night  CE probable 2-3 yrs     2. RD precautions given and reviewed. Patient knows to call/ message if any further changes in symptoms occur.  3. Low suspect w/ moderate -large c/d  Shallow angles   Low teens IOP  Neg fhx glaucoma  Monitor   4. Updated specs for distance only Rx ---ok continue with current    Discussed and educated patient on current findings /plan.  RTC 1 year, prn if any changes / issues

## 2022-06-22 ENCOUNTER — PATIENT MESSAGE (OUTPATIENT)
Dept: ADMINISTRATIVE | Facility: OTHER | Age: 77
End: 2022-06-22
Payer: MEDICARE

## 2022-08-18 ENCOUNTER — OFFICE VISIT (OUTPATIENT)
Dept: PULMONOLOGY | Facility: CLINIC | Age: 77
End: 2022-08-18
Payer: MEDICARE

## 2022-08-18 VITALS
BODY MASS INDEX: 25.91 KG/M2 | HEIGHT: 59 IN | OXYGEN SATURATION: 97 % | DIASTOLIC BLOOD PRESSURE: 61 MMHG | SYSTOLIC BLOOD PRESSURE: 124 MMHG | HEART RATE: 60 BPM | WEIGHT: 128.5 LBS

## 2022-08-18 DIAGNOSIS — Z91.09 ENVIRONMENTAL ALLERGIES: ICD-10-CM

## 2022-08-18 DIAGNOSIS — J44.89 ASTHMA-COPD OVERLAP SYNDROME: ICD-10-CM

## 2022-08-18 DIAGNOSIS — J44.9 CHRONIC OBSTRUCTIVE PULMONARY DISEASE, UNSPECIFIED COPD TYPE: Primary | ICD-10-CM

## 2022-08-18 PROCEDURE — 99999 PR PBB SHADOW E&M-EST. PATIENT-LVL III: CPT | Mod: PBBFAC,,, | Performed by: INTERNAL MEDICINE

## 2022-08-18 PROCEDURE — 3078F PR MOST RECENT DIASTOLIC BLOOD PRESSURE < 80 MM HG: ICD-10-PCS | Mod: CPTII,S$GLB,, | Performed by: INTERNAL MEDICINE

## 2022-08-18 PROCEDURE — 3044F PR MOST RECENT HEMOGLOBIN A1C LEVEL <7.0%: ICD-10-PCS | Mod: CPTII,S$GLB,, | Performed by: INTERNAL MEDICINE

## 2022-08-18 PROCEDURE — 99999 PR PBB SHADOW E&M-EST. PATIENT-LVL III: ICD-10-PCS | Mod: PBBFAC,,, | Performed by: INTERNAL MEDICINE

## 2022-08-18 PROCEDURE — 3044F HG A1C LEVEL LT 7.0%: CPT | Mod: CPTII,S$GLB,, | Performed by: INTERNAL MEDICINE

## 2022-08-18 PROCEDURE — 1157F PR ADVANCE CARE PLAN OR EQUIV PRESENT IN MEDICAL RECORD: ICD-10-PCS | Mod: CPTII,S$GLB,, | Performed by: INTERNAL MEDICINE

## 2022-08-18 PROCEDURE — 3074F SYST BP LT 130 MM HG: CPT | Mod: CPTII,S$GLB,, | Performed by: INTERNAL MEDICINE

## 2022-08-18 PROCEDURE — 1157F ADVNC CARE PLAN IN RCRD: CPT | Mod: CPTII,S$GLB,, | Performed by: INTERNAL MEDICINE

## 2022-08-18 PROCEDURE — 3078F DIAST BP <80 MM HG: CPT | Mod: CPTII,S$GLB,, | Performed by: INTERNAL MEDICINE

## 2022-08-18 PROCEDURE — 1126F AMNT PAIN NOTED NONE PRSNT: CPT | Mod: CPTII,S$GLB,, | Performed by: INTERNAL MEDICINE

## 2022-08-18 PROCEDURE — 3074F PR MOST RECENT SYSTOLIC BLOOD PRESSURE < 130 MM HG: ICD-10-PCS | Mod: CPTII,S$GLB,, | Performed by: INTERNAL MEDICINE

## 2022-08-18 PROCEDURE — 99214 PR OFFICE/OUTPT VISIT, EST, LEVL IV, 30-39 MIN: ICD-10-PCS | Mod: S$GLB,,, | Performed by: INTERNAL MEDICINE

## 2022-08-18 PROCEDURE — 1126F PR PAIN SEVERITY QUANTIFIED, NO PAIN PRESENT: ICD-10-PCS | Mod: CPTII,S$GLB,, | Performed by: INTERNAL MEDICINE

## 2022-08-18 PROCEDURE — 99214 OFFICE O/P EST MOD 30 MIN: CPT | Mod: S$GLB,,, | Performed by: INTERNAL MEDICINE

## 2022-08-18 RX ORDER — FLUTICASONE FUROATE, UMECLIDINIUM BROMIDE AND VILANTEROL TRIFENATATE 200; 62.5; 25 UG/1; UG/1; UG/1
1 POWDER RESPIRATORY (INHALATION) DAILY
Qty: 180 EACH | Refills: 3 | Status: SHIPPED | OUTPATIENT
Start: 2022-08-18 | End: 2023-02-09 | Stop reason: SDUPTHER

## 2022-08-18 RX ORDER — MONTELUKAST SODIUM 10 MG/1
10 TABLET ORAL NIGHTLY
Qty: 90 TABLET | Refills: 3 | Status: SHIPPED | OUTPATIENT
Start: 2022-08-18 | End: 2023-02-09 | Stop reason: SDUPTHER

## 2022-08-18 NOTE — PROGRESS NOTES
8/18/2022    Cassidy Stanford  Follow up    Chief Complaint   Patient presents with    COPD       HPI:   08/18/2022- needs refill trelegy and singulair  Has been using breo/incruse. Uses rescue inhaler rarely- admits not using enough  She coughs a lot, sometimes wheezes, attributes to weather/allergy  Denies night wakings, denies exacerbation since last visit.    2/17/22-   Stop breo and start trelegy for asthma- one puff daily, rinse mouth after use  Cough suspect due to bad COPD/asthma, will have to manage it and may get better with stronger medicine  Get PFT  Consider trial of stopping benicar 2 wks to see if the cough gets better    Pt is a 75 yo female with osteoporosis, allergies and asthma (COPD?- no pft available)establishing care. Dr. Holland's notes reviewed- last visit 6/18/21.  Used to use incruse  Advair in past  Current regimen breo inhaler daily, singulair in evening. Hardly ever uses albuterol. Denies exacerbations  Never uses nebulizer any more- few yrs ago was much worse and then got better controlled.  Coughs a lot- lots of mucous early am white/pale yellow, gradually decreases through the day. Wheezes sometimes. Exercises every am- elliptical 2 mi then weights, no SOB.  Childhood asthma, then came back in 2016.  Mother and father smoked in the house- lots of secondhand smoke but she never smoked.  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  Gets allregies- triggers dust and yard work. She gets out of the house while  cleans house due to severe dust allergy. Used to have cat and felt allergic to cat. No pets now.    The chief complaint problem is stable    PFSH:  Past Medical History:   Diagnosis Date    Allergy     Arthritis     Asthma     Carotid arterial disease     next us due 12/22    COPD (chronic obstructive pulmonary disease)     moderate to severe on PFTs 2/16 (exposure to parental smoke only) (AAT neg)    Gallbladder polyp     repeat us 12/22    H/O  "cardiovascular stress test     normal May 2017    Hyperlipidemia     Hypertension     Osteopenia     dexa 7/14 and 5/17    Osteoporosis     dexa 12/20    Prediabetes          Past Surgical History:   Procedure Laterality Date    BREAST BIOPSY Left 2003    core, benign    R knee scope      2013    skin grafts x 2       Social History     Tobacco Use    Smoking status: Never Smoker    Smokeless tobacco: Never Used   Substance Use Topics    Alcohol use: Yes     Alcohol/week: 0.0 standard drinks     Comment: rarely    Drug use: No     Family History   Problem Relation Age of Onset    Cancer Mother 80        colon    Heart attack Father 66        MI SCD    Hypertension Sister     Glaucoma Neg Hx     Macular degeneration Neg Hx      Review of patient's allergies indicates:   Allergen Reactions    House dust mite     Penicillins      Other reaction(s): Rash    Sulfa (sulfonamide antibiotics) Swelling     Other reaction(s): Unknown       Performance Status:The patient's activity level is regular exercise.      Review of Systems:  a review of eleven systems covering constitutional, Eye, HEENT, Psych, Respiratory, Cardiac, GI, , Musculoskeletal, Endocrine, Dermatologic was negative except for pertinent findings as listed ABOVE and below:  All negative with pertinent positives as above        Exam:Comprehensive exam done. /61 (BP Location: Right arm, Patient Position: Sitting, BP Method: Medium (Automatic))   Pulse 60   Ht 4' 11" (1.499 m)   Wt 58.3 kg (128 lb 8.5 oz)   SpO2 97%   BMI 25.96 kg/m²   Exam included Vitals as listed, and patient's appearance and affect and alertness and mood, oral exam for yeast and hygiene and pharynx lesions and Mallapatti (M) score, neck with inspection for jvd and masses and thyroid abnormalities and lymph nodes (supraclavicular and infraclavicular nodes and axillary also examined and noted if abn), chest exam included symmetry and effort and fremitus and " percussion and auscultation, cardiac exam included rhythm and gallops and murmur and rubs and jvd and edema, abdominal exam for mass and hepatosplenomegaly and tenderness and hernias and bowel sounds, Musculoskeletal exam with muscle tone and posture and mobility/gait and  strength, and skin for rashes and cyanosis and pallor and turgor, extremity for clubbing.  Findings were normal except for pertinent findings listed below:  Oropharynx clear, M1  HR regular  bilat good air movement with scant scattered wheezes  No clubbing/edema    Radiographs (ct chest and cxr) reviewed: view by direct vision   CXR 11/2020- clear lungs    Labs reviewed    Results for CASSIDY GEORGE (MRN [id number]) as of 2/17/2022 09:31   Ref. Range 11/28/2017 08:02 11/28/2018 07:37 12/7/2019 07:46 12/17/2020 08:06 1/19/2022 07:28   Eos # Latest Ref Range: 0.0 - 0.5 K/uL 0.2 0.3 0.4 0.2 0.2     Results for CASSIDY GEORGE (MRN [id number]) as of 2/17/2022 09:31   Ref. Range 2/8/2016 11:29   IgG Latest Ref Range: 650 - 1600 mg/dL 938   IgM Latest Ref Range: 50 - 300 mg/dL 70   IgA Latest Ref Range: 40 - 350 mg/dL 155   IgE Latest Ref Range: 0 - 100 IU/mL 122 (H)   IgG 1 Latest Ref Range: 490 - 1140 mg/dL 508   IgG 2 Latest Ref Range: 150 - 640 mg/dL 321   IgG 3 Latest Ref Range: 11 - 85 mg/dL 32   IgG 4 Latest Ref Range: 3 - 201 mg/dL 31       PFT    reviewed 2/21/22- mild obstruction, nonsignificant bd response, hyperinflation, normal dlco      Plan:  Clinical impression is apparently straight forward and impression with management as below. COPD/asthma, not in exacerbation, adequate control currently. We did discuss biologic as an option if she worsens.    Cassidy was seen today for copd.    Diagnoses and all orders for this visit:    Chronic obstructive pulmonary disease, unspecified COPD type  -     montelukast (SINGULAIR) 10 mg tablet; Take 1 tablet (10 mg total) by mouth every evening.  -     fluticasone-umeclidin-vilanter (TRELEGY ELLIPTA)  200-62.5-25 mcg inhaler; Inhale 1 puff into the lungs once daily.    Asthma-COPD overlap syndrome    Environmental allergies        Follow up in about 1 year (around 8/18/2023).    Discussed with patient above for education the following:      Patient Instructions   Trelegy one puff daily  Albuterol use as needed, may help with cough  Continue montelukast

## 2022-09-12 ENCOUNTER — PATIENT MESSAGE (OUTPATIENT)
Dept: RADIOLOGY | Facility: HOSPITAL | Age: 77
End: 2022-09-12
Payer: MEDICARE

## 2022-09-29 ENCOUNTER — HOSPITAL ENCOUNTER (OUTPATIENT)
Dept: RADIOLOGY | Facility: HOSPITAL | Age: 77
Discharge: HOME | End: 2022-09-29
Attending: FAMILY MEDICINE
Payer: MEDICARE

## 2022-09-29 DIAGNOSIS — Z12.31 ENCOUNTER FOR SCREENING MAMMOGRAM FOR MALIGNANT NEOPLASM OF BREAST: ICD-10-CM

## 2022-09-29 PROCEDURE — 77067 MAMMO DIGITAL SCREENING BILAT WITH TOMO: ICD-10-PCS | Mod: 26,,, | Performed by: RADIOLOGY

## 2022-09-29 PROCEDURE — 77067 SCR MAMMO BI INCL CAD: CPT | Mod: 26,,, | Performed by: RADIOLOGY

## 2022-09-29 PROCEDURE — 77063 BREAST TOMOSYNTHESIS BI: CPT | Mod: 26,,, | Performed by: RADIOLOGY

## 2022-09-29 PROCEDURE — 77063 MAMMO DIGITAL SCREENING BILAT WITH TOMO: ICD-10-PCS | Mod: 26,,, | Performed by: RADIOLOGY

## 2022-09-29 PROCEDURE — 77067 SCR MAMMO BI INCL CAD: CPT | Mod: TC,PO

## 2022-10-01 ENCOUNTER — PATIENT MESSAGE (OUTPATIENT)
Dept: FAMILY MEDICINE | Facility: CLINIC | Age: 77
End: 2022-10-01
Payer: MEDICARE

## 2022-12-29 ENCOUNTER — PATIENT MESSAGE (OUTPATIENT)
Dept: FAMILY MEDICINE | Facility: CLINIC | Age: 77
End: 2022-12-29
Payer: MEDICARE

## 2022-12-29 ENCOUNTER — TELEPHONE (OUTPATIENT)
Dept: FAMILY MEDICINE | Facility: CLINIC | Age: 77
End: 2022-12-29
Payer: MEDICARE

## 2022-12-29 NOTE — TELEPHONE ENCOUNTER
Pt with +covid test today  2 days of sx with nasal congestion, chronic cough with no significant change, and body aches  No fever, nausea, vomiting, diarrhea   sick too    Rx for paxlovid sent.  Will hold pravachol while taking    Start covid monitoring program    If no better or worse, pt will contact us.

## 2023-01-01 ENCOUNTER — PATIENT MESSAGE (OUTPATIENT)
Dept: FAMILY MEDICINE | Facility: CLINIC | Age: 78
End: 2023-01-01
Payer: MEDICARE

## 2023-01-04 NOTE — TELEPHONE ENCOUNTER
----- Message from Elbert Smith sent at 1/4/2023  2:35 PM CST -----  Type: Needs Medical Advice  Who Called:  pt  Symptoms (please be specific):  pt said she need to know the office policy on COVID pt's said she tested + for COVID last Thursday and it will be a week tomorrow and she need to know if she should come to her appt--said she do not feel sick or anything--please advise before her appt tomorrow said she call twice and have not heard anything from the office--please call and advise  Best Call Back Number: [phone] (home)     Additional Information: thank you

## 2023-01-07 ENCOUNTER — OFFICE VISIT (OUTPATIENT)
Dept: FAMILY MEDICINE | Facility: CLINIC | Age: 78
End: 2023-01-07
Payer: MEDICARE

## 2023-01-07 VITALS
OXYGEN SATURATION: 99 % | TEMPERATURE: 98 F | HEIGHT: 59 IN | WEIGHT: 120.13 LBS | SYSTOLIC BLOOD PRESSURE: 130 MMHG | RESPIRATION RATE: 12 BRPM | BODY MASS INDEX: 24.22 KG/M2 | DIASTOLIC BLOOD PRESSURE: 72 MMHG | HEART RATE: 73 BPM

## 2023-01-07 DIAGNOSIS — Z00.00 ANNUAL PHYSICAL EXAM: Primary | ICD-10-CM

## 2023-01-07 DIAGNOSIS — I77.9 CAROTID ARTERY DISEASE, UNSPECIFIED LATERALITY, UNSPECIFIED TYPE: ICD-10-CM

## 2023-01-07 DIAGNOSIS — R09.89 OTHER SPECIFIED SYMPTOMS AND SIGNS INVOLVING THE CIRCULATORY AND RESPIRATORY SYSTEMS: ICD-10-CM

## 2023-01-07 DIAGNOSIS — I10 HYPERTENSION, ESSENTIAL: ICD-10-CM

## 2023-01-07 DIAGNOSIS — E78.5 HYPERLIPIDEMIA, UNSPECIFIED HYPERLIPIDEMIA TYPE: ICD-10-CM

## 2023-01-07 DIAGNOSIS — R79.9 ABNORMAL FINDING OF BLOOD CHEMISTRY, UNSPECIFIED: ICD-10-CM

## 2023-01-07 DIAGNOSIS — J44.9 CHRONIC OBSTRUCTIVE PULMONARY DISEASE, UNSPECIFIED COPD TYPE: ICD-10-CM

## 2023-01-07 PROCEDURE — 3075F PR MOST RECENT SYSTOLIC BLOOD PRESS GE 130-139MM HG: ICD-10-PCS | Mod: CPTII,S$GLB,, | Performed by: FAMILY MEDICINE

## 2023-01-07 PROCEDURE — 3078F DIAST BP <80 MM HG: CPT | Mod: CPTII,S$GLB,, | Performed by: FAMILY MEDICINE

## 2023-01-07 PROCEDURE — 1126F PR PAIN SEVERITY QUANTIFIED, NO PAIN PRESENT: ICD-10-PCS | Mod: CPTII,S$GLB,, | Performed by: FAMILY MEDICINE

## 2023-01-07 PROCEDURE — 1159F PR MEDICATION LIST DOCUMENTED IN MEDICAL RECORD: ICD-10-PCS | Mod: CPTII,S$GLB,, | Performed by: FAMILY MEDICINE

## 2023-01-07 PROCEDURE — 1157F ADVNC CARE PLAN IN RCRD: CPT | Mod: CPTII,S$GLB,, | Performed by: FAMILY MEDICINE

## 2023-01-07 PROCEDURE — 1159F MED LIST DOCD IN RCRD: CPT | Mod: CPTII,S$GLB,, | Performed by: FAMILY MEDICINE

## 2023-01-07 PROCEDURE — 1101F PR PT FALLS ASSESS DOC 0-1 FALLS W/OUT INJ PAST YR: ICD-10-PCS | Mod: CPTII,S$GLB,, | Performed by: FAMILY MEDICINE

## 2023-01-07 PROCEDURE — 3288F PR FALLS RISK ASSESSMENT DOCUMENTED: ICD-10-PCS | Mod: CPTII,S$GLB,, | Performed by: FAMILY MEDICINE

## 2023-01-07 PROCEDURE — 99397 PR PREVENTIVE VISIT,EST,65 & OVER: ICD-10-PCS | Mod: S$GLB,,, | Performed by: FAMILY MEDICINE

## 2023-01-07 PROCEDURE — 1101F PT FALLS ASSESS-DOCD LE1/YR: CPT | Mod: CPTII,S$GLB,, | Performed by: FAMILY MEDICINE

## 2023-01-07 PROCEDURE — 1157F PR ADVANCE CARE PLAN OR EQUIV PRESENT IN MEDICAL RECORD: ICD-10-PCS | Mod: CPTII,S$GLB,, | Performed by: FAMILY MEDICINE

## 2023-01-07 PROCEDURE — 3078F PR MOST RECENT DIASTOLIC BLOOD PRESSURE < 80 MM HG: ICD-10-PCS | Mod: CPTII,S$GLB,, | Performed by: FAMILY MEDICINE

## 2023-01-07 PROCEDURE — 1160F PR REVIEW ALL MEDS BY PRESCRIBER/CLIN PHARMACIST DOCUMENTED: ICD-10-PCS | Mod: CPTII,S$GLB,, | Performed by: FAMILY MEDICINE

## 2023-01-07 PROCEDURE — 99397 PER PM REEVAL EST PAT 65+ YR: CPT | Mod: S$GLB,,, | Performed by: FAMILY MEDICINE

## 2023-01-07 PROCEDURE — 1160F RVW MEDS BY RX/DR IN RCRD: CPT | Mod: CPTII,S$GLB,, | Performed by: FAMILY MEDICINE

## 2023-01-07 PROCEDURE — 3075F SYST BP GE 130 - 139MM HG: CPT | Mod: CPTII,S$GLB,, | Performed by: FAMILY MEDICINE

## 2023-01-07 PROCEDURE — 3288F FALL RISK ASSESSMENT DOCD: CPT | Mod: CPTII,S$GLB,, | Performed by: FAMILY MEDICINE

## 2023-01-07 PROCEDURE — 1126F AMNT PAIN NOTED NONE PRSNT: CPT | Mod: CPTII,S$GLB,, | Performed by: FAMILY MEDICINE

## 2023-01-08 ENCOUNTER — PATIENT MESSAGE (OUTPATIENT)
Dept: FAMILY MEDICINE | Facility: CLINIC | Age: 78
End: 2023-01-08
Payer: MEDICARE

## 2023-01-08 DIAGNOSIS — M81.0 OSTEOPOROSIS, UNSPECIFIED OSTEOPOROSIS TYPE, UNSPECIFIED PATHOLOGICAL FRACTURE PRESENCE: ICD-10-CM

## 2023-01-08 DIAGNOSIS — K82.4 GALLBLADDER POLYP: Primary | ICD-10-CM

## 2023-01-08 NOTE — PROGRESS NOTES
Subjective:       Patient ID: Cassidy Stanford is a 77 y.o. female.    Chief Complaint: Annual Exam    HPI    The patient is a 77-year-old who is here today for her annual exam.  She is now 9 days out from a recent COVID infection.    Today we discussed the followin)  Recent COVID infection.  Both she and her  recently tested positive for COVID.  She is 9 days out from her positive COVID  infection.  She did complete Paxlovid.  She is still blowing her nose a bit.  Her cough is also a bit more intense and more frequent than her baseline cough.  She does note that she has some urinary incontinence with coughing which she typically does not experience.  Overall, she does feel that she has improved well  2) health maintenance issues.  She does need fasting labs.  She had her mammogram in September.  Colon cancer screening was last done in 2016 and is due.  She does stay physically active going to the gym and doing classes regularly  3) hypertension.  Today blood pressure is 130/72.  She is taking Benicar which she is tolerating well   4)  Hyperlipidemia.  She continues with her Pravachol which she is doing well with.  She is due for a fasting lipid profile soon  5) pre diabetes.  She does not have any polyuria, polydipsia or polyphasia.  She is due for an A1c  6) COPD.  She continues with trilogy and Singulair which works fairly well for her.          Review of Systems   Constitutional:  Negative for appetite change, chills, diaphoresis, fatigue, fever and unexpected weight change.   HENT:  Negative for congestion, dental problem, ear pain, hearing loss, postnasal drip, rhinorrhea, sneezing, sore throat and trouble swallowing.    Eyes:  Negative for photophobia, pain, discharge and visual disturbance.   Respiratory:  Negative for cough, chest tightness, shortness of breath and wheezing.    Cardiovascular:  Negative for chest pain, palpitations and leg swelling.   Gastrointestinal:  Negative for  abdominal distention, abdominal pain, blood in stool, constipation, diarrhea, nausea and vomiting.   Endocrine: Negative for cold intolerance, heat intolerance, polydipsia and polyuria.   Genitourinary:  Negative for dysuria, flank pain, frequency, genital sores, hematuria, menstrual problem and vaginal discharge.   Musculoskeletal:  Negative for arthralgias, joint swelling and myalgias.   Skin:  Negative for rash.   Neurological:  Negative for dizziness, syncope, light-headedness and headaches.   Hematological:  Negative for adenopathy. Does not bruise/bleed easily.   Psychiatric/Behavioral:  Negative for dysphoric mood, self-injury, sleep disturbance and suicidal ideas. The patient is not nervous/anxious.        Objective:      Physical Exam  Constitutional:       General: She is not in acute distress.     Appearance: Normal appearance. She is well-developed.   HENT:      Head: Normocephalic and atraumatic.      Right Ear: Hearing, tympanic membrane, ear canal and external ear normal.      Left Ear: Hearing, tympanic membrane, ear canal and external ear normal.      Nose: Nose normal.      Mouth/Throat:      Mouth: No oral lesions.      Pharynx: No oropharyngeal exudate or posterior oropharyngeal erythema.   Eyes:      General: Lids are normal. No scleral icterus.     Extraocular Movements: Extraocular movements intact.      Conjunctiva/sclera: Conjunctivae normal.      Pupils: Pupils are equal, round, and reactive to light.   Neck:      Thyroid: No thyroid mass or thyromegaly.      Vascular: No carotid bruit.   Cardiovascular:      Rate and Rhythm: Normal rate and regular rhythm. No extrasystoles are present.     Chest Wall: PMI is not displaced.      Heart sounds: Normal heart sounds. No murmur heard.    No gallop.   Pulmonary:      Effort: Pulmonary effort is normal. No accessory muscle usage or respiratory distress.      Breath sounds: Normal breath sounds.   Abdominal:      General: Bowel sounds are normal.  "There is no abdominal bruit.      Palpations: Abdomen is soft.      Tenderness: There is no abdominal tenderness. There is no rebound.   Musculoskeletal:      Cervical back: Normal range of motion and neck supple.   Lymphadenopathy:      Head:      Right side of head: No submental or submandibular adenopathy.      Left side of head: No submental or submandibular adenopathy.      Cervical:      Right cervical: No superficial, deep or posterior cervical adenopathy.     Left cervical: No superficial, deep or posterior cervical adenopathy.      Upper Body:      Right upper body: No supraclavicular adenopathy.      Left upper body: No supraclavicular adenopathy.   Skin:     General: Skin is warm and dry.   Neurological:      Mental Status: She is alert and oriented to person, place, and time.      Cranial Nerves: No cranial nerve deficit.      Sensory: No sensory deficit.   Psychiatric:         Speech: Speech normal.         Behavior: Behavior normal.         Thought Content: Thought content normal.     Blood pressure 130/72, pulse 73, temperature 98.2 °F (36.8 °C), temperature source Temporal, resp. rate 12, height 4' 11" (1.499 m), weight 54.5 kg (120 lb 2.4 oz), SpO2 99 %.Body mass index is 24.27 kg/m².            A/P:  1) annual exam.  Health maintenance issues and anticipatory guidance issues were discussed.  She will continue to stay physically active and consume a healthy diet.  We will check fasting labs soon.  She will keep up-to-date with her annual mammogram.  We will consider colon cancer screening given her health  2) Recent COVID infection.  Improving.  If her symptoms do not completely resolve within the next week or if she develops any new worsening symptoms, she will let me  3) hypertension.  Well controlled.  Continue with Benicar    4)  Hyperlipidemia.  Previously well controlled.  Continue Pravachol.  We will check fasting labs soon  5) pre diabetes.  Asymptomatic.  We will check an A1c with upcoming " labs  6) COPD.  Stable.  Continue with trelegy and Singulair.  Follow-up with pulmonology yearly as planned.        7) carotid artery disease.  Mild.  We will check a carotid ultrasound  8) gallbladder polyp.  Status unknown.  We will check an ultrasound      9) osteoporosis.  Status unknown.  We will recheck a DEXA

## 2023-01-13 ENCOUNTER — HOSPITAL ENCOUNTER (OUTPATIENT)
Dept: RADIOLOGY | Facility: HOSPITAL | Age: 78
Discharge: HOME | End: 2023-01-13
Attending: FAMILY MEDICINE
Payer: MEDICARE

## 2023-01-13 DIAGNOSIS — I77.9 CAROTID ARTERY DISEASE, UNSPECIFIED LATERALITY, UNSPECIFIED TYPE: ICD-10-CM

## 2023-01-13 DIAGNOSIS — R09.89 OTHER SPECIFIED SYMPTOMS AND SIGNS INVOLVING THE CIRCULATORY AND RESPIRATORY SYSTEMS: ICD-10-CM

## 2023-01-13 DIAGNOSIS — K82.4 GALLBLADDER POLYP: ICD-10-CM

## 2023-01-13 PROCEDURE — 93880 EXTRACRANIAL BILAT STUDY: CPT | Mod: 26,,, | Performed by: RADIOLOGY

## 2023-01-13 PROCEDURE — 76705 ECHO EXAM OF ABDOMEN: CPT | Mod: 26,,, | Performed by: RADIOLOGY

## 2023-01-13 PROCEDURE — 93880 EXTRACRANIAL BILAT STUDY: CPT | Mod: TC,PO

## 2023-01-13 PROCEDURE — 93880 US CAROTID BILATERAL: ICD-10-PCS | Mod: 26,,, | Performed by: RADIOLOGY

## 2023-01-13 PROCEDURE — 76705 ECHO EXAM OF ABDOMEN: CPT | Mod: TC,PO

## 2023-01-13 PROCEDURE — 76705 US ABDOMEN LIMITED: ICD-10-PCS | Mod: 26,,, | Performed by: RADIOLOGY

## 2023-01-16 ENCOUNTER — PATIENT MESSAGE (OUTPATIENT)
Dept: FAMILY MEDICINE | Facility: CLINIC | Age: 78
End: 2023-01-16
Payer: MEDICARE

## 2023-01-19 ENCOUNTER — HOSPITAL ENCOUNTER (OUTPATIENT)
Dept: RADIOLOGY | Facility: HOSPITAL | Age: 78
Discharge: HOME | End: 2023-01-19
Attending: FAMILY MEDICINE
Payer: MEDICARE

## 2023-01-19 DIAGNOSIS — M81.0 OSTEOPOROSIS, UNSPECIFIED OSTEOPOROSIS TYPE, UNSPECIFIED PATHOLOGICAL FRACTURE PRESENCE: ICD-10-CM

## 2023-01-19 PROCEDURE — 77080 DEXA BONE DENSITY SPINE HIP: ICD-10-PCS | Mod: 26,,, | Performed by: RADIOLOGY

## 2023-01-19 PROCEDURE — 77080 DXA BONE DENSITY AXIAL: CPT | Mod: 26,,, | Performed by: RADIOLOGY

## 2023-01-19 PROCEDURE — 77080 DXA BONE DENSITY AXIAL: CPT | Mod: TC,PO

## 2023-01-20 ENCOUNTER — PATIENT MESSAGE (OUTPATIENT)
Dept: FAMILY MEDICINE | Facility: CLINIC | Age: 78
End: 2023-01-20
Payer: MEDICARE

## 2023-02-04 DIAGNOSIS — E78.5 HYPERLIPIDEMIA: Chronic | ICD-10-CM

## 2023-02-04 NOTE — TELEPHONE ENCOUNTER
No new care gaps identified.  [facility] Embedded Care Gaps. Reference number: [id number]. 2/04/2023   9:45:42 AM CST

## 2023-02-05 RX ORDER — OLMESARTAN MEDOXOMIL 5 MG/1
5 TABLET ORAL DAILY
Qty: 90 TABLET | Refills: 3 | Status: SHIPPED | OUTPATIENT
Start: 2023-02-05 | End: 2023-02-13 | Stop reason: SDUPTHER

## 2023-02-05 NOTE — TELEPHONE ENCOUNTER
Refill Decision Note   Cassidy Stanford  is requesting a refill authorization.  Brief Assessment and Rationale for Refill:  Approve     Medication Therapy Plan:       Medication Reconciliation Completed: No   Comments:     No Care Gaps recommended.     Note composed:6:39 AM 02/05/2023

## 2023-02-24 ENCOUNTER — OFFICE VISIT (OUTPATIENT)
Dept: FAMILY MEDICINE | Facility: CLINIC | Age: 78
End: 2023-02-24
Payer: MEDICARE

## 2023-02-24 VITALS
HEIGHT: 59 IN | BODY MASS INDEX: 25.64 KG/M2 | DIASTOLIC BLOOD PRESSURE: 60 MMHG | SYSTOLIC BLOOD PRESSURE: 118 MMHG | OXYGEN SATURATION: 96 % | WEIGHT: 127.19 LBS | HEART RATE: 70 BPM

## 2023-02-24 DIAGNOSIS — I10 ESSENTIAL HYPERTENSION: Chronic | ICD-10-CM

## 2023-02-24 DIAGNOSIS — I65.23 BILATERAL CAROTID ARTERY STENOSIS: ICD-10-CM

## 2023-02-24 DIAGNOSIS — J44.9 CHRONIC OBSTRUCTIVE PULMONARY DISEASE, UNSPECIFIED COPD TYPE: ICD-10-CM

## 2023-02-24 DIAGNOSIS — Z00.00 ENCOUNTER FOR PREVENTIVE HEALTH EXAMINATION: Primary | ICD-10-CM

## 2023-02-24 DIAGNOSIS — E78.5 DYSLIPIDEMIA: ICD-10-CM

## 2023-02-24 PROCEDURE — G0439 PR MEDICARE ANNUAL WELLNESS SUBSEQUENT VISIT: ICD-10-PCS | Mod: S$GLB,,, | Performed by: NURSE PRACTITIONER

## 2023-02-24 PROCEDURE — 99999 PR PBB SHADOW E&M-EST. PATIENT-LVL IV: CPT | Mod: PBBFAC,,, | Performed by: NURSE PRACTITIONER

## 2023-02-24 PROCEDURE — 3288F PR FALLS RISK ASSESSMENT DOCUMENTED: ICD-10-PCS | Mod: CPTII,S$GLB,, | Performed by: NURSE PRACTITIONER

## 2023-02-24 PROCEDURE — 1159F PR MEDICATION LIST DOCUMENTED IN MEDICAL RECORD: ICD-10-PCS | Mod: CPTII,S$GLB,, | Performed by: NURSE PRACTITIONER

## 2023-02-24 PROCEDURE — 1101F PT FALLS ASSESS-DOCD LE1/YR: CPT | Mod: CPTII,S$GLB,, | Performed by: NURSE PRACTITIONER

## 2023-02-24 PROCEDURE — 1160F PR REVIEW ALL MEDS BY PRESCRIBER/CLIN PHARMACIST DOCUMENTED: ICD-10-PCS | Mod: CPTII,S$GLB,, | Performed by: NURSE PRACTITIONER

## 2023-02-24 PROCEDURE — G0439 PPPS, SUBSEQ VISIT: HCPCS | Mod: S$GLB,,, | Performed by: NURSE PRACTITIONER

## 2023-02-24 PROCEDURE — 3078F PR MOST RECENT DIASTOLIC BLOOD PRESSURE < 80 MM HG: ICD-10-PCS | Mod: CPTII,S$GLB,, | Performed by: NURSE PRACTITIONER

## 2023-02-24 PROCEDURE — 1159F MED LIST DOCD IN RCRD: CPT | Mod: CPTII,S$GLB,, | Performed by: NURSE PRACTITIONER

## 2023-02-24 PROCEDURE — 1101F PR PT FALLS ASSESS DOC 0-1 FALLS W/OUT INJ PAST YR: ICD-10-PCS | Mod: CPTII,S$GLB,, | Performed by: NURSE PRACTITIONER

## 2023-02-24 PROCEDURE — 1170F FXNL STATUS ASSESSED: CPT | Mod: CPTII,S$GLB,, | Performed by: NURSE PRACTITIONER

## 2023-02-24 PROCEDURE — 1160F RVW MEDS BY RX/DR IN RCRD: CPT | Mod: CPTII,S$GLB,, | Performed by: NURSE PRACTITIONER

## 2023-02-24 PROCEDURE — 1157F ADVNC CARE PLAN IN RCRD: CPT | Mod: CPTII,S$GLB,, | Performed by: NURSE PRACTITIONER

## 2023-02-24 PROCEDURE — 1157F PR ADVANCE CARE PLAN OR EQUIV PRESENT IN MEDICAL RECORD: ICD-10-PCS | Mod: CPTII,S$GLB,, | Performed by: NURSE PRACTITIONER

## 2023-02-24 PROCEDURE — 3074F PR MOST RECENT SYSTOLIC BLOOD PRESSURE < 130 MM HG: ICD-10-PCS | Mod: CPTII,S$GLB,, | Performed by: NURSE PRACTITIONER

## 2023-02-24 PROCEDURE — 3078F DIAST BP <80 MM HG: CPT | Mod: CPTII,S$GLB,, | Performed by: NURSE PRACTITIONER

## 2023-02-24 PROCEDURE — 99999 PR PBB SHADOW E&M-EST. PATIENT-LVL IV: ICD-10-PCS | Mod: PBBFAC,,, | Performed by: NURSE PRACTITIONER

## 2023-02-24 PROCEDURE — 3288F FALL RISK ASSESSMENT DOCD: CPT | Mod: CPTII,S$GLB,, | Performed by: NURSE PRACTITIONER

## 2023-02-24 PROCEDURE — 1170F PR FUNCTIONAL STATUS ASSESSED: ICD-10-PCS | Mod: CPTII,S$GLB,, | Performed by: NURSE PRACTITIONER

## 2023-02-24 PROCEDURE — 1126F AMNT PAIN NOTED NONE PRSNT: CPT | Mod: CPTII,S$GLB,, | Performed by: NURSE PRACTITIONER

## 2023-02-24 PROCEDURE — 1126F PR PAIN SEVERITY QUANTIFIED, NO PAIN PRESENT: ICD-10-PCS | Mod: CPTII,S$GLB,, | Performed by: NURSE PRACTITIONER

## 2023-02-24 PROCEDURE — 3074F SYST BP LT 130 MM HG: CPT | Mod: CPTII,S$GLB,, | Performed by: NURSE PRACTITIONER

## 2023-02-24 NOTE — PATIENT INSTRUCTIONS
Counseling and Referral of Other Preventative  (Italic type indicates deductible and co-insurance are waived)    Patient Name: Cassidy Stanford  Today's Date: 2/24/2023    Health Maintenance       Date Due Completion Date    COVID-19 Vaccine (4 - Booster for Pfizer series) 11/18/2021 9/23/2021    Mammogram 09/29/2023 9/29/2022    Aspirin/Antiplatelet Therapy 01/07/2024 1/7/2023    Hemoglobin A1c (Prediabetes) 01/13/2024 1/13/2023    Lipid Panel 01/13/2024 1/13/2023    TETANUS VACCINE 10/05/2025 10/5/2015    DEXA Scan 01/19/2026 1/19/2023        No orders of the defined types were placed in this encounter.    The following information is provided to all patients.  This information is to help you find resources for any of the problems found today that may be affecting your health:                Living healthy guide: www.[facility].[place].[facility]      Understanding Diabetes: www.diabetes.org      Eating healthy: www.cdc.gov/healthyweight      CDC home safety checklist: www.cdc.gov/steadi/patient.html      Agency on Aging: www.goea.[place].[facility]      Alcoholics anonymous (AA): www.aa.org      Physical Activity: www.nii.nih.gov/sg3fnwz      Tobacco use: www.quitwithusla.org

## 2023-02-24 NOTE — PROGRESS NOTES
"  Cassidy Stanford presented for a  Medicare AWV and comprehensive Health Risk Assessment today. The following components were reviewed and updated:    Medical history  Family History  Social history  Allergies and Current Medications  Health Risk Assessment  Health Maintenance  Care Team         ** See Completed Assessments for Annual Wellness Visit within the encounter summary.**         The following assessments were completed:  Living Situation  CAGE  Depression Screening  Timed Get Up and Go  Whisper Test  Cognitive Function Screening      Nutrition Screening  ADL Screening  PAQ Screening    Review for Opioid Screening: Patient does not have rx for Opioids.    Review for Substance Use Disorders: Patient does not use substance.     Vitals:    02/24/23 0706   BP: 118/60   BP Location: Left arm   Patient Position: Sitting   BP Method: Medium (Manual)   Pulse: 70   SpO2: 96%   Weight: 57.7 kg (127 lb 3.3 oz)   Height: 4' 11" (1.499 m)     Body mass index is 25.69 kg/m².  Physical Exam  Vitals reviewed.   Constitutional:       General: She is not in acute distress.  HENT:      Head: Normocephalic.   Cardiovascular:      Rate and Rhythm: Normal rate.   Pulmonary:      Effort: Pulmonary effort is normal. No respiratory distress.   Skin:     General: Skin is warm.   Neurological:      General: No focal deficit present.      Mental Status: She is alert.   Psychiatric:         Mood and Affect: Mood normal.             Diagnoses and health risks identified today and associated recommendations/orders:    1. Encounter for preventive health examination  Reviewed health maintenance and provided recommendations   Recommend COVID booster  All additional health maintenance is UTD     2. Bilateral carotid artery stenosis  Continue to monitor  Followed by Radha Danielle MD .      3. Chronic obstructive pulmonary disease, unspecified COPD type  Continue to monitor  Followed by Dr Alaniz.      4. Dyslipidemia  Continue to " monitor  Followed by Radha Danielle MD .      5. Essential hypertension  Continue to monitor  Followed by Radha Danielle MD .        Provided Cassidy with a 5-10 year written screening schedule and personal prevention plan. Recommendations were developed using the USPSTF age appropriate recommendations. Education, counseling, and referrals were provided as needed. After Visit Summary printed and given to patient which includes a list of additional screenings\tests needed.    Follow up in one year    MARIAN Watson offered to discuss advanced care planning, including how to pick a person who would make decisions for you if you were unable to make them for yourself, called a health care power of , and what kind of decisions you might make such as use of life sustaining treatments such as ventilators and tube feeding when faced with a life limiting illness recorded on a living will that they will need to know. (How you want to be cared for as you near the end of your natural life)     X  Patient has advanced directives on file, which we reviewed, and they do not wish to make changes.

## 2023-06-05 ENCOUNTER — OFFICE VISIT (OUTPATIENT)
Dept: OPTOMETRY | Facility: CLINIC | Age: 78
End: 2023-06-05
Payer: MEDICARE

## 2023-06-05 DIAGNOSIS — H25.13 NUCLEAR SCLEROSIS, BILATERAL: Primary | ICD-10-CM

## 2023-06-05 DIAGNOSIS — H52.203 MYOPIA WITH ASTIGMATISM AND PRESBYOPIA, BILATERAL: ICD-10-CM

## 2023-06-05 DIAGNOSIS — Z13.5 GLAUCOMA SCREENING: ICD-10-CM

## 2023-06-05 DIAGNOSIS — H52.13 MYOPIA WITH ASTIGMATISM AND PRESBYOPIA, BILATERAL: ICD-10-CM

## 2023-06-05 DIAGNOSIS — H43.813 POSTERIOR VITREOUS DETACHMENT, BILATERAL: ICD-10-CM

## 2023-06-05 DIAGNOSIS — H52.4 MYOPIA WITH ASTIGMATISM AND PRESBYOPIA, BILATERAL: ICD-10-CM

## 2023-06-05 PROCEDURE — 1126F PR PAIN SEVERITY QUANTIFIED, NO PAIN PRESENT: ICD-10-PCS | Mod: CPTII,S$GLB,, | Performed by: OPTOMETRIST

## 2023-06-05 PROCEDURE — 92014 COMPRE OPH EXAM EST PT 1/>: CPT | Mod: S$GLB,,, | Performed by: OPTOMETRIST

## 2023-06-05 PROCEDURE — 99999 PR PBB SHADOW E&M-EST. PATIENT-LVL III: ICD-10-PCS | Mod: PBBFAC,,, | Performed by: OPTOMETRIST

## 2023-06-05 PROCEDURE — 1126F AMNT PAIN NOTED NONE PRSNT: CPT | Mod: CPTII,S$GLB,, | Performed by: OPTOMETRIST

## 2023-06-05 PROCEDURE — 1157F PR ADVANCE CARE PLAN OR EQUIV PRESENT IN MEDICAL RECORD: ICD-10-PCS | Mod: CPTII,S$GLB,, | Performed by: OPTOMETRIST

## 2023-06-05 PROCEDURE — 1101F PT FALLS ASSESS-DOCD LE1/YR: CPT | Mod: CPTII,S$GLB,, | Performed by: OPTOMETRIST

## 2023-06-05 PROCEDURE — 1159F PR MEDICATION LIST DOCUMENTED IN MEDICAL RECORD: ICD-10-PCS | Mod: CPTII,S$GLB,, | Performed by: OPTOMETRIST

## 2023-06-05 PROCEDURE — 3288F PR FALLS RISK ASSESSMENT DOCUMENTED: ICD-10-PCS | Mod: CPTII,S$GLB,, | Performed by: OPTOMETRIST

## 2023-06-05 PROCEDURE — 92014 PR EYE EXAM, EST PATIENT,COMPREHESV: ICD-10-PCS | Mod: S$GLB,,, | Performed by: OPTOMETRIST

## 2023-06-05 PROCEDURE — 3288F FALL RISK ASSESSMENT DOCD: CPT | Mod: CPTII,S$GLB,, | Performed by: OPTOMETRIST

## 2023-06-05 PROCEDURE — 1159F MED LIST DOCD IN RCRD: CPT | Mod: CPTII,S$GLB,, | Performed by: OPTOMETRIST

## 2023-06-05 PROCEDURE — 1101F PR PT FALLS ASSESS DOC 0-1 FALLS W/OUT INJ PAST YR: ICD-10-PCS | Mod: CPTII,S$GLB,, | Performed by: OPTOMETRIST

## 2023-06-05 PROCEDURE — 1157F ADVNC CARE PLAN IN RCRD: CPT | Mod: CPTII,S$GLB,, | Performed by: OPTOMETRIST

## 2023-06-05 PROCEDURE — 99999 PR PBB SHADOW E&M-EST. PATIENT-LVL III: CPT | Mod: PBBFAC,,, | Performed by: OPTOMETRIST

## 2023-06-05 NOTE — PROGRESS NOTES
HPI    DLS 06/07/2022    Pt states her [facility] OU seems unchanged.  Glasses work well for driving.    Wondering if cataracts have changed and if she should wear her glasses   more frequently.      At's PRN  Last edited by Elmira Hand on 6/5/2023  9:55 AM.            Assessment /Plan     For exam results, see Encounter Report.    Nuclear sclerosis, bilateral    Posterior vitreous detachment, bilateral    Glaucoma screening    Myopia with astigmatism and presbyopia, bilateral      Vis sig NS, borderline ready for consult, gave info, cautions driving---CE possible 2-3 yrs   RD precautions given and reviewed. Patient knows to call/ message if any further changes in symptoms occur.  Moderate -large c/d, angles open but shallow, IOP low normal ---monitor all   Gave copy of specs, no new refraction    Discussed and educated patient on current findings /plan.  RTC 1 year, prn if any changes / issues

## 2023-06-05 NOTE — PATIENT INSTRUCTIONS
"DRY EYES -- BURNING OR REJI SYMPTOMS:  Use Over The Counter artificial tears as needed for dry eye symptoms.   Some common brands include:  Systane, Optive, Refresh, and Thera-Tears.  These drops can be used as frequently as desired, but may be most helpful use during long periods of concentrated work.  For example, reading / working at the computer. Start with 3-4x per day.     Nighttime Ophthalmic gel or ointments are available: Refresh PM, Genteal, and Lacrilube.    Avoid drops that "get redness out" (Visine, Murine, Clear Eyes), as these may contain medication that could further irritate the eyes, especially with chronic use.    ALLERGY EYES -- ITCHING SYMPTOMS:  Over the counter medications include--Pataday, Zaditor, and Alaway.  Use as directed 1-2 drops daily for symptoms of itching / watering eyes.  These drops will not help for dry eye or exposure symptoms.    REDNESS RELIEF:  Lumify---is a good redness reliever that will not cause irritation if used chronically.        Early Cataracts--not visually significant for surgery consultation.    What Are Cataracts?  A clear lens in the eye focuses light. This lets the eye see images sharply. With age, the lens slowly becomes cloudy. The cloudy lens is a cataract. A cataract scatters light and makes it hard for the eye to focus. Cataracts often form in both eyes. But one lens may cloud faster than the other.      The Aging of Your Lens    Your lens may cloud so slowly that you don`t notice any vision changes at first. But as the cataract gets worse, the eye has a harder time focusing. In early stages, glasses may help you see better. As the lens gets cloudier, your doctor may recommend surgery to restore your vision.   FLASHES / FLOATERS / POSTERIOR VITREOUS DETACHMENT    Call the clinic if you have any further changes in symptoms.  Including:  Increased numbers of floaters or flashing lights, dimness or darkness that moves through or stays constant in your " vision, or any pain in the eye (s).    You may sometimes see small specks or clouds moving in your field of vision.  They are called FLOATERS.  You can often see them when looking at a plain background, like a blank wall or blue bonnie.  Floaters are actually tiny clumps of gel or cells inside the VITREOUS, the clear jelly-like fluid that fills the inside of your eye.    While these objects look like they are in front of your eye, they are actually floating inside.  What you see are the shadows they cast on the RETINA, the nerve layer at the back of the eye that senses light and allows you to see.      POSTERIOR VITREOUS DETACHMENT    The appearance of new floaters may be alarming.  If you suddenly develop new floaters, you should contact your eye care professional  right away.    The retina can tear if the shrinking vitreous pulls away from the wall of the eye.  This sometimes causes a small amount of bleeding in the eye that may appear as new floaters.    A torn retina is always a serious problem, since it can lead to a retinal detachment.  You should see your eye care professional as soon as possible if:    even one new floater appears suddenly;  you see sudden flashes of light;  you notice other symptoms, like the loss of side vision, or a curtain closes down in your vision        POSTERIOR VITREOUS DETACHMENT is more common for people who:    are nearsighted;  have had cataract surgery;  have had YAG laser surgery of the eye;  have had inflammation inside the eye;  are over age 60.      While some floaters may remain visible, many of them will fade over time and become less noticeable.  Even if you've had some floaters for years, you should have your eyes checked as soon as possible if you notice new ones.    FLASHING LIGHTS    When the vitreous gel rubs or pulls on the retina, you may see what look like flashing lights or lightning streaks.  These flashes can appear off and on for several weeks or months.       Some people experience flashes of light that appear as jagged lines or heat waves in both eyes, lasting 10-20 minutes.  These flashes are caused by a spasm of blood vessels in the brain, which is called a migraine.    If a headache follows these flashes, it's called a migraine headache.  If   no headache occurs, these flashes are called Ophthalmic or Ocular Migraine.

## 2023-07-27 ENCOUNTER — PATIENT MESSAGE (OUTPATIENT)
Dept: FAMILY MEDICINE | Facility: CLINIC | Age: 78
End: 2023-07-27
Payer: MEDICARE

## 2023-07-27 DIAGNOSIS — Z12.31 SCREENING MAMMOGRAM FOR HIGH-RISK PATIENT: Primary | ICD-10-CM

## 2023-07-29 ENCOUNTER — PATIENT MESSAGE (OUTPATIENT)
Dept: FAMILY MEDICINE | Facility: CLINIC | Age: 78
End: 2023-07-29
Payer: MEDICARE

## 2023-08-22 ENCOUNTER — OFFICE VISIT (OUTPATIENT)
Dept: PULMONOLOGY | Facility: CLINIC | Age: 78
End: 2023-08-22
Payer: MEDICARE

## 2023-08-22 VITALS
SYSTOLIC BLOOD PRESSURE: 135 MMHG | HEART RATE: 57 BPM | HEIGHT: 59 IN | BODY MASS INDEX: 25.72 KG/M2 | DIASTOLIC BLOOD PRESSURE: 66 MMHG | OXYGEN SATURATION: 96 % | WEIGHT: 127.56 LBS

## 2023-08-22 DIAGNOSIS — J44.89 ASTHMA-COPD OVERLAP SYNDROME: Primary | ICD-10-CM

## 2023-08-22 DIAGNOSIS — Z91.09 ENVIRONMENTAL ALLERGIES: ICD-10-CM

## 2023-08-22 PROCEDURE — 1159F PR MEDICATION LIST DOCUMENTED IN MEDICAL RECORD: ICD-10-PCS | Mod: CPTII,S$GLB,, | Performed by: INTERNAL MEDICINE

## 2023-08-22 PROCEDURE — 1126F PR PAIN SEVERITY QUANTIFIED, NO PAIN PRESENT: ICD-10-PCS | Mod: CPTII,S$GLB,, | Performed by: INTERNAL MEDICINE

## 2023-08-22 PROCEDURE — 1101F PR PT FALLS ASSESS DOC 0-1 FALLS W/OUT INJ PAST YR: ICD-10-PCS | Mod: CPTII,S$GLB,, | Performed by: INTERNAL MEDICINE

## 2023-08-22 PROCEDURE — 3078F PR MOST RECENT DIASTOLIC BLOOD PRESSURE < 80 MM HG: ICD-10-PCS | Mod: CPTII,S$GLB,, | Performed by: INTERNAL MEDICINE

## 2023-08-22 PROCEDURE — 3075F SYST BP GE 130 - 139MM HG: CPT | Mod: CPTII,S$GLB,, | Performed by: INTERNAL MEDICINE

## 2023-08-22 PROCEDURE — 1157F PR ADVANCE CARE PLAN OR EQUIV PRESENT IN MEDICAL RECORD: ICD-10-PCS | Mod: CPTII,S$GLB,, | Performed by: INTERNAL MEDICINE

## 2023-08-22 PROCEDURE — 1101F PT FALLS ASSESS-DOCD LE1/YR: CPT | Mod: CPTII,S$GLB,, | Performed by: INTERNAL MEDICINE

## 2023-08-22 PROCEDURE — 3075F PR MOST RECENT SYSTOLIC BLOOD PRESS GE 130-139MM HG: ICD-10-PCS | Mod: CPTII,S$GLB,, | Performed by: INTERNAL MEDICINE

## 2023-08-22 PROCEDURE — 1126F AMNT PAIN NOTED NONE PRSNT: CPT | Mod: CPTII,S$GLB,, | Performed by: INTERNAL MEDICINE

## 2023-08-22 PROCEDURE — 3288F FALL RISK ASSESSMENT DOCD: CPT | Mod: CPTII,S$GLB,, | Performed by: INTERNAL MEDICINE

## 2023-08-22 PROCEDURE — 1157F ADVNC CARE PLAN IN RCRD: CPT | Mod: CPTII,S$GLB,, | Performed by: INTERNAL MEDICINE

## 2023-08-22 PROCEDURE — 3078F DIAST BP <80 MM HG: CPT | Mod: CPTII,S$GLB,, | Performed by: INTERNAL MEDICINE

## 2023-08-22 PROCEDURE — 99214 PR OFFICE/OUTPT VISIT, EST, LEVL IV, 30-39 MIN: ICD-10-PCS | Mod: S$GLB,,, | Performed by: INTERNAL MEDICINE

## 2023-08-22 PROCEDURE — 1159F MED LIST DOCD IN RCRD: CPT | Mod: CPTII,S$GLB,, | Performed by: INTERNAL MEDICINE

## 2023-08-22 PROCEDURE — 99214 OFFICE O/P EST MOD 30 MIN: CPT | Mod: S$GLB,,, | Performed by: INTERNAL MEDICINE

## 2023-08-22 PROCEDURE — 99999 PR PBB SHADOW E&M-EST. PATIENT-LVL III: ICD-10-PCS | Mod: PBBFAC,,, | Performed by: INTERNAL MEDICINE

## 2023-08-22 PROCEDURE — 3288F PR FALLS RISK ASSESSMENT DOCUMENTED: ICD-10-PCS | Mod: CPTII,S$GLB,, | Performed by: INTERNAL MEDICINE

## 2023-08-22 PROCEDURE — 99999 PR PBB SHADOW E&M-EST. PATIENT-LVL III: CPT | Mod: PBBFAC,,, | Performed by: INTERNAL MEDICINE

## 2023-08-22 NOTE — PATIENT INSTRUCTIONS
Continue trelegy inhaler one puff daily  Try albuterol inhaler as needed for coughing spells  Continue singulair daily

## 2023-08-22 NOTE — PROGRESS NOTES
8/22/2023    Cassidy Stanford  Follow up    Chief Complaint   Patient presents with    COPD       HPI:   08/22/2023- pt having more cough this year, worse in am, productive pale yellow mucous varied quantity, always there and persistent, some days better than others. She continues to take trelegy daily. Has not been using albuterol. She denies exacerbations.   She did have covid twice, not severe- just lost taste and smell, years ago.  She goes to gym daily M-F, 1 hr exercise    08/18/2022- needs refill trelegy and singulair  Has been using breo/incruse. Uses rescue inhaler rarely- admits not using enough  She coughs a lot, sometimes wheezes, attributes to weather/allergy  Denies night wakings, denies exacerbation since last visit.    2/17/22-   Stop breo and start trelegy for asthma- one puff daily, rinse mouth after use  Cough suspect due to bad COPD/asthma, will have to manage it and may get better with stronger medicine  Get PFT  Consider trial of stopping benicar 2 wks to see if the cough gets better    Pt is a 75 yo female with osteoporosis, allergies and asthma (COPD?- no pft available)establishing care. Dr. Holland's notes reviewed- last visit 6/18/21.  Used to use incruse  Advair in past  Current regimen breo inhaler daily, singulair in evening. Hardly ever uses albuterol. Denies exacerbations  Never uses nebulizer any more- few yrs ago was much worse and then got better controlled.  Coughs a lot- lots of mucous early am white/pale yellow, gradually decreases through the day. Wheezes sometimes. Exercises every am- elliptical 2 mi then weights, no SOB.  Childhood asthma, then came back in 2016.  Mother and father smoked in the house- lots of secondhand smoke but she never smoked.  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  Gets allergies- triggers dust and yard work. She gets out of the house while  cleans house due to severe dust allergy. Used to have cat and felt allergic to cat.  "No pets now.    The chief complaint problem is stable    PFSH:  Past Medical History:   Diagnosis Date    Allergy     Arthritis     Asthma     COPD (chronic obstructive pulmonary disease)     moderate to severe on PFTs 2/16 (exposure to parental smoke only) (AAT neg)    Gallbladder polyp     repeat us 1/25    H/O cardiovascular stress test     normal May 2017    Hyperlipidemia     Hypertension     Osteopenia     dexa 7/14 and 5/17    Osteoporosis     dexa 12/20    Prediabetes          Past Surgical History:   Procedure Laterality Date    BREAST BIOPSY Left 2003    core, benign    R knee scope      2013    skin grafts x 2       Social History     Tobacco Use    Smoking status: Never    Smokeless tobacco: Never   Substance Use Topics    Alcohol use: Yes     Alcohol/week: 0.0 standard drinks of alcohol     Comment: rarely    Drug use: No     Family History   Problem Relation Age of Onset    Cancer Mother 80        colon    Heart attack Father 66        MI SCD    Hypertension Sister     Glaucoma Neg Hx     Macular degeneration Neg Hx      Review of patient's allergies indicates:   Allergen Reactions    House dust mite     Penicillins      Other reaction(s): Rash    Sulfa (sulfonamide antibiotics) Swelling     Other reaction(s): Unknown       Performance Status:The patient's activity level is regular exercise.      Review of Systems:  a review of eleven systems covering constitutional, Eye, HEENT, Psych, Respiratory, Cardiac, GI, , Musculoskeletal, Endocrine, Dermatologic was negative except for pertinent findings as listed ABOVE and below:  All negative with pertinent positives as above        Exam:Comprehensive exam done. /66 (BP Location: Right arm, Patient Position: Sitting, BP Method: Medium (Automatic))   Pulse (!) 57   Ht 4' 11" (1.499 m)   Wt 57.9 kg (127 lb 8.6 oz)   SpO2 96%   BMI 25.76 kg/m²   Exam included Vitals as listed, and patient's appearance and affect and alertness and mood, oral exam for " yeast and hygiene and pharynx lesions and Mallapatti (M) score, neck with inspection for jvd and masses and thyroid abnormalities and lymph nodes (supraclavicular and infraclavicular nodes and axillary also examined and noted if abn), chest exam included symmetry and effort and fremitus and percussion and auscultation, cardiac exam included rhythm and gallops and murmur and rubs and jvd and edema, abdominal exam for mass and hepatosplenomegaly and tenderness and hernias and bowel sounds, Musculoskeletal exam with muscle tone and posture and mobility/gait and  strength, and skin for rashes and cyanosis and pallor and turgor, extremity for clubbing.  Findings were normal except for pertinent findings listed below:  Oropharynx clear, M1  HR regular  Clear breath sounds bilaterally  No clubbing/edema    Radiographs (ct chest and cxr) reviewed: view by direct vision   CXR 11/2020- clear lungs    Labs reviewed    Lab Results   Component Value Date    WBC 5.09 01/13/2023    HGB 12.3 01/13/2023    HCT 37.7 01/13/2023    MCV 94 01/13/2023     01/13/2023       CMP  Sodium   Date Value Ref Range Status   01/13/2023 140 136 - 145 mmol/L Final     Potassium   Date Value Ref Range Status   01/13/2023 3.8 3.5 - 5.1 mmol/L Final     Chloride   Date Value Ref Range Status   01/13/2023 105 95 - 110 mmol/L Final     CO2   Date Value Ref Range Status   01/13/2023 29 23 - 29 mmol/L Final     Glucose   Date Value Ref Range Status   01/13/2023 85 70 - 110 mg/dL Final     BUN   Date Value Ref Range Status   01/13/2023 13 8 - 23 mg/dL Final     Creatinine   Date Value Ref Range Status   01/13/2023 0.8 0.5 - 1.4 mg/dL Final     Calcium   Date Value Ref Range Status   01/13/2023 9.6 8.7 - 10.5 mg/dL Final     Total Protein   Date Value Ref Range Status   01/13/2023 6.7 6.0 - 8.4 g/dL Final     Albumin   Date Value Ref Range Status   01/13/2023 3.6 3.5 - 5.2 g/dL Final     Total Bilirubin   Date Value Ref Range Status   01/13/2023  0.4 0.1 - 1.0 mg/dL Final     Comment:     For infants and newborns, interpretation of results should be based  on gestational age, weight and in agreement with clinical  observations.    Premature Infant recommended reference ranges:  Up to 24 hours.............<8.0 mg/dL  Up to 48 hours............<12.0 mg/dL  3-5 days..................<15.0 mg/dL  6-29 days.................<15.0 mg/dL       Alkaline Phosphatase   Date Value Ref Range Status   01/13/2023 60 55 - 135 U/L Final     AST   Date Value Ref Range Status   01/13/2023 18 10 - 40 U/L Final     ALT   Date Value Ref Range Status   01/13/2023 16 10 - 44 U/L Final     Anion Gap   Date Value Ref Range Status   01/13/2023 6 (L) 8 - 16 mmol/L Final     eGFR   Date Value Ref Range Status   01/13/2023 >60.0 >60 mL/min/1.73 m^2 Final       Results for CASSIDY GEORGE (MRN [id number]) as of 2/17/2022 09:31   Ref. Range 11/28/2017 08:02 11/28/2018 07:37 12/7/2019 07:46 12/17/2020 08:06 1/19/2022 07:28   Eos # Latest Ref Range: 0.0 - 0.5 K/uL 0.2 0.3 0.4 0.2 0.2     Results for CASSIDY GEORGE (MRN [id number]) as of 2/17/2022 09:31   Ref. Range 2/8/2016 11:29   IgG Latest Ref Range: 650 - 1600 mg/dL 938   IgM Latest Ref Range: 50 - 300 mg/dL 70   IgA Latest Ref Range: 40 - 350 mg/dL 155   IgE Latest Ref Range: 0 - 100 IU/mL 122 (H)   IgG 1 Latest Ref Range: 490 - 1140 mg/dL 508   IgG 2 Latest Ref Range: 150 - 640 mg/dL 321   IgG 3 Latest Ref Range: 11 - 85 mg/dL 32   IgG 4 Latest Ref Range: 3 - 201 mg/dL 31       PFT    reviewed 2/21/22- mild obstruction, nonsignificant bd response, hyperinflation, normal dlco      Plan:  Clinical impression is apparently straight forward and impression with management as below. COPD/asthma, not in exacerbation, adequate control currently. We did discuss biologic as an option if she worsens.    Cassidy was seen today for copd.    Diagnoses and all orders for this visit:    Asthma-COPD overlap syndrome    Environmental allergies          Follow  up in about 1 year (around 8/22/2024).    Discussed with patient above for education the following:      Patient Instructions   Continue trelegy inhaler one puff daily  Try albuterol inhaler as needed for coughing spells  Continue singulair daily

## 2023-10-05 ENCOUNTER — PATIENT MESSAGE (OUTPATIENT)
Dept: FAMILY MEDICINE | Facility: CLINIC | Age: 78
End: 2023-10-05
Payer: MEDICARE

## 2023-10-05 ENCOUNTER — HOSPITAL ENCOUNTER (OUTPATIENT)
Dept: RADIOLOGY | Facility: HOSPITAL | Age: 78
Discharge: HOME | End: 2023-10-05
Attending: FAMILY MEDICINE
Payer: MEDICARE

## 2023-10-05 DIAGNOSIS — I10 HYPERTENSION, ESSENTIAL: ICD-10-CM

## 2023-10-05 DIAGNOSIS — E78.5 HYPERLIPIDEMIA, UNSPECIFIED HYPERLIPIDEMIA TYPE: ICD-10-CM

## 2023-10-05 DIAGNOSIS — R79.9 ABNORMAL FINDING OF BLOOD CHEMISTRY, UNSPECIFIED: ICD-10-CM

## 2023-10-05 DIAGNOSIS — Z12.31 SCREENING MAMMOGRAM FOR HIGH-RISK PATIENT: ICD-10-CM

## 2023-10-05 DIAGNOSIS — Z00.00 ANNUAL PHYSICAL EXAM: Primary | ICD-10-CM

## 2023-10-05 PROCEDURE — 77067 SCR MAMMO BI INCL CAD: CPT | Mod: TC,PO

## 2023-10-05 PROCEDURE — 77067 SCR MAMMO BI INCL CAD: CPT | Mod: 26,,, | Performed by: RADIOLOGY

## 2023-10-05 PROCEDURE — 77063 BREAST TOMOSYNTHESIS BI: CPT | Mod: 26,,, | Performed by: RADIOLOGY

## 2023-10-05 PROCEDURE — 77063 MAMMO DIGITAL SCREENING BILAT WITH TOMO: ICD-10-PCS | Mod: 26,,, | Performed by: RADIOLOGY

## 2023-10-05 PROCEDURE — 77067 MAMMO DIGITAL SCREENING BILAT WITH TOMO: ICD-10-PCS | Mod: 26,,, | Performed by: RADIOLOGY

## 2023-11-08 ENCOUNTER — PATIENT MESSAGE (OUTPATIENT)
Dept: FAMILY MEDICINE | Facility: CLINIC | Age: 78
End: 2023-11-08
Payer: MEDICARE

## 2023-11-08 DIAGNOSIS — E78.5 HYPERLIPIDEMIA: Chronic | ICD-10-CM

## 2023-11-09 RX ORDER — PRAVASTATIN SODIUM 20 MG/1
20 TABLET ORAL NIGHTLY
Qty: 90 TABLET | Refills: 0 | Status: SHIPPED | OUTPATIENT
Start: 2023-11-09 | End: 2024-01-14 | Stop reason: SDUPTHER

## 2023-11-09 NOTE — TELEPHONE ENCOUNTER
Care Due:                  Date            Visit Type   Department     Provider  --------------------------------------------------------------------------------                                EP -                              PRIMARY      [facility]    Radha Danielle  Last Visit: 01-      CARE ([facility])   MEDICINE       Anger                              MYCHART                              FOLLOWUP/OF  [facility] Radha Danielle  Next Visit: 02-      [facility] VISIT   MEDICINE       Anger                                                            Last  Test          Frequency    Reason                     Performed    Due Date  --------------------------------------------------------------------------------    CMP.........  12 months..  olmesartan...............  01- 01-    [facility] Embedded Care Due Messages. Reference number: [id number].   11/09/2023 2:16:28 PM CST

## 2023-12-07 DIAGNOSIS — J44.9 CHRONIC OBSTRUCTIVE PULMONARY DISEASE, UNSPECIFIED COPD TYPE: ICD-10-CM

## 2023-12-08 RX ORDER — OLMESARTAN MEDOXOMIL 5 MG/1
5 TABLET ORAL
Qty: 90 TABLET | Refills: 0 | Status: SHIPPED | OUTPATIENT
Start: 2023-12-08 | End: 2024-02-05 | Stop reason: SDUPTHER

## 2023-12-08 NOTE — TELEPHONE ENCOUNTER
Refill Decision Note   Cassidy Perlouis  is requesting a refill authorization.  Brief Assessment and Rationale for Refill:  Approve     Medication Therapy Plan:  FLOS      Comments:     Note composed:4:55 PM 12/08/2023

## 2023-12-08 NOTE — TELEPHONE ENCOUNTER
Care Due:                  Date            Visit Type   Department     Provider  --------------------------------------------------------------------------------                                EP -                              PRIMARY      [facility]    Radha Danielle  Last Visit: 01-      CARE ([facility])   MEDICINE       Anger                              MYCHART                              FOLLOWUP/OF  [facility] Radha Danielle  Next Visit: 02-      [facility] VISIT   MEDICINE       Anger                                                            Last  Test          Frequency    Reason                     Performed    Due Date  --------------------------------------------------------------------------------    Lipid Panel.  12 months..  pravastatin..............  01- 01-    [facility] Embedded Care Due Messages. Reference number: [id number].   12/07/2023 9:43:18 PM CST

## 2023-12-09 RX ORDER — MONTELUKAST SODIUM 10 MG/1
10 TABLET ORAL NIGHTLY
Qty: 90 TABLET | Refills: 3 | Status: SHIPPED | OUTPATIENT
Start: 2023-12-09

## 2023-12-09 RX ORDER — FLUTICASONE FUROATE, UMECLIDINIUM BROMIDE AND VILANTEROL TRIFENATATE 200; 62.5; 25 UG/1; UG/1; UG/1
POWDER RESPIRATORY (INHALATION)
Qty: 180 EACH | Refills: 3 | Status: SHIPPED | OUTPATIENT
Start: 2023-12-09

## 2024-01-14 DIAGNOSIS — E78.5 HYPERLIPIDEMIA: Chronic | ICD-10-CM

## 2024-01-15 NOTE — TELEPHONE ENCOUNTER
Care Due:                  Date            Visit Type   Department     Provider  --------------------------------------------------------------------------------                                EP -                              PRIMARY      [facility]    Radha Danielle  Last Visit: 01-      CARE ([facility])   MEDICINE       Anger                              MYCHART                              FOLLOWUP/OF  [facility] Radha Danielle  Next Visit: 02-      [facility]ICE VISIT   MEDICINE       Anger                                                            Last  Test          Frequency    Reason                     Performed    Due Date  --------------------------------------------------------------------------------    CMP.........  12 months..  olmesartan, pravastatin..  01- 01-    [facility] Embedded Care Due Messages. Reference number: [id number].   1/14/2024 8:24:04 PM CST

## 2024-01-17 RX ORDER — PRAVASTATIN SODIUM 20 MG/1
20 TABLET ORAL NIGHTLY
Qty: 90 TABLET | Refills: 0 | Status: SHIPPED | OUTPATIENT
Start: 2024-01-17 | End: 2024-02-05 | Stop reason: SDUPTHER

## 2024-01-17 NOTE — TELEPHONE ENCOUNTER
Refill Routing Note   Medication(s) are not appropriate for processing by [facility] for the following reason(s):        Required labs outdated    [facility] action(s):  Defer        Medication Therapy Plan: FLOS      Appointments  past 12m or future 3m with PCP    Date Provider   Last Visit   1/7/2023 Radha Danielle MD   Next Visit   2/5/2024 Radha Danielle MD   ED visits in past 90 days: 0        Note composed:9:05 AM 01/17/2024

## 2024-01-30 ENCOUNTER — LAB VISIT (OUTPATIENT)
Dept: LAB | Facility: HOSPITAL | Age: 79
End: 2024-01-30
Attending: FAMILY MEDICINE
Payer: MEDICARE

## 2024-01-30 DIAGNOSIS — Z00.00 ANNUAL PHYSICAL EXAM: ICD-10-CM

## 2024-01-30 DIAGNOSIS — E78.5 HYPERLIPIDEMIA, UNSPECIFIED HYPERLIPIDEMIA TYPE: ICD-10-CM

## 2024-01-30 DIAGNOSIS — I10 HYPERTENSION, ESSENTIAL: ICD-10-CM

## 2024-01-30 DIAGNOSIS — R79.9 ABNORMAL FINDING OF BLOOD CHEMISTRY, UNSPECIFIED: ICD-10-CM

## 2024-01-30 LAB
ALBUMIN SERPL BCP-MCNC: 3.8 G/DL (ref 3.5–5.2)
ALP SERPL-CCNC: 55 U/L (ref 55–135)
ALT SERPL W/O P-5'-P-CCNC: 17 U/L (ref 10–44)
ANION GAP SERPL CALC-SCNC: 5 MMOL/L (ref 8–16)
AST SERPL-CCNC: 24 U/L (ref 10–40)
BASOPHILS # BLD AUTO: 0.05 K/UL (ref 0–0.2)
BASOPHILS NFR BLD: 1.1 % (ref 0–1.9)
BILIRUB SERPL-MCNC: 0.3 MG/DL (ref 0.1–1)
BUN SERPL-MCNC: 19 MG/DL (ref 8–23)
CALCIUM SERPL-MCNC: 10.1 MG/DL (ref 8.7–10.5)
CHLORIDE SERPL-SCNC: 108 MMOL/L (ref 95–110)
CHOLEST SERPL-MCNC: 163 MG/DL (ref 120–199)
CHOLEST/HDLC SERPL: 2.7 {RATIO} (ref 2–5)
CO2 SERPL-SCNC: 27 MMOL/L (ref 23–29)
CREAT SERPL-MCNC: 0.8 MG/DL (ref 0.5–1.4)
DIFFERENTIAL METHOD BLD: ABNORMAL
EOSINOPHIL # BLD AUTO: 0.3 K/UL (ref 0–0.5)
EOSINOPHIL NFR BLD: 6.9 % (ref 0–8)
ERYTHROCYTE [DISTWIDTH] IN BLOOD BY AUTOMATED COUNT: 12.1 % (ref 11.5–14.5)
EST. GFR  (NO RACE VARIABLE): >60 ML/MIN/1.73 M^2
ESTIMATED AVG GLUCOSE: 117 MG/DL (ref 68–131)
GLUCOSE SERPL-MCNC: 88 MG/DL (ref 70–110)
HBA1C MFR BLD: 5.7 % (ref 4–5.6)
HCT VFR BLD AUTO: 37.6 % (ref 37–48.5)
HDLC SERPL-MCNC: 60 MG/DL (ref 40–75)
HDLC SERPL: 36.8 % (ref 20–50)
HGB BLD-MCNC: 12.2 G/DL (ref 12–16)
IMM GRANULOCYTES # BLD AUTO: 0.02 K/UL (ref 0–0.04)
IMM GRANULOCYTES NFR BLD AUTO: 0.5 % (ref 0–0.5)
LDLC SERPL CALC-MCNC: 90.4 MG/DL (ref 63–159)
LYMPHOCYTES # BLD AUTO: 0.8 K/UL (ref 1–4.8)
LYMPHOCYTES NFR BLD: 19 % (ref 18–48)
MCH RBC QN AUTO: 30.3 PG (ref 27–31)
MCHC RBC AUTO-ENTMCNC: 32.4 G/DL (ref 32–36)
MCV RBC AUTO: 94 FL (ref 82–98)
MONOCYTES # BLD AUTO: 0.4 K/UL (ref 0.3–1)
MONOCYTES NFR BLD: 8.7 % (ref 4–15)
NEUTROPHILS # BLD AUTO: 2.8 K/UL (ref 1.8–7.7)
NEUTROPHILS NFR BLD: 63.8 % (ref 38–73)
NONHDLC SERPL-MCNC: 103 MG/DL
NRBC BLD-RTO: 0 /100 WBC
PLATELET # BLD AUTO: 226 K/UL (ref 150–450)
PMV BLD AUTO: 8.7 FL (ref 9.2–12.9)
POTASSIUM SERPL-SCNC: 4.6 MMOL/L (ref 3.5–5.1)
PROT SERPL-MCNC: 6.5 G/DL (ref 6–8.4)
RBC # BLD AUTO: 4.02 M/UL (ref 4–5.4)
SODIUM SERPL-SCNC: 140 MMOL/L (ref 136–145)
TRIGL SERPL-MCNC: 63 MG/DL (ref 30–150)
TSH SERPL DL<=0.005 MIU/L-ACNC: 3.65 UIU/ML (ref 0.4–4)
WBC # BLD AUTO: 4.36 K/UL (ref 3.9–12.7)

## 2024-01-30 PROCEDURE — 85025 COMPLETE CBC W/AUTO DIFF WBC: CPT | Performed by: FAMILY MEDICINE

## 2024-01-30 PROCEDURE — 83036 HEMOGLOBIN GLYCOSYLATED A1C: CPT | Performed by: FAMILY MEDICINE

## 2024-01-30 PROCEDURE — 36415 COLL VENOUS BLD VENIPUNCTURE: CPT | Mod: PO | Performed by: FAMILY MEDICINE

## 2024-01-30 PROCEDURE — 80061 LIPID PANEL: CPT | Performed by: FAMILY MEDICINE

## 2024-01-30 PROCEDURE — 84443 ASSAY THYROID STIM HORMONE: CPT | Performed by: FAMILY MEDICINE

## 2024-01-30 PROCEDURE — 80053 COMPREHEN METABOLIC PANEL: CPT | Performed by: FAMILY MEDICINE

## 2024-02-01 ENCOUNTER — PATIENT MESSAGE (OUTPATIENT)
Dept: FAMILY MEDICINE | Facility: CLINIC | Age: 79
End: 2024-02-01
Payer: MEDICARE

## 2024-02-05 ENCOUNTER — OFFICE VISIT (OUTPATIENT)
Dept: FAMILY MEDICINE | Facility: CLINIC | Age: 79
End: 2024-02-05
Payer: MEDICARE

## 2024-02-05 VITALS
HEART RATE: 68 BPM | BODY MASS INDEX: 25.46 KG/M2 | TEMPERATURE: 98 F | OXYGEN SATURATION: 97 % | DIASTOLIC BLOOD PRESSURE: 68 MMHG | RESPIRATION RATE: 16 BRPM | WEIGHT: 126.31 LBS | HEIGHT: 59 IN | SYSTOLIC BLOOD PRESSURE: 128 MMHG

## 2024-02-05 DIAGNOSIS — E78.5 HYPERLIPIDEMIA: Chronic | ICD-10-CM

## 2024-02-05 DIAGNOSIS — J44.9 CHRONIC OBSTRUCTIVE PULMONARY DISEASE, UNSPECIFIED COPD TYPE: ICD-10-CM

## 2024-02-05 DIAGNOSIS — Z00.00 ANNUAL PHYSICAL EXAM: Primary | ICD-10-CM

## 2024-02-05 DIAGNOSIS — I77.9 CAROTID ARTERY DISEASE, UNSPECIFIED LATERALITY, UNSPECIFIED TYPE: ICD-10-CM

## 2024-02-05 DIAGNOSIS — I10 HYPERTENSION, ESSENTIAL: ICD-10-CM

## 2024-02-05 DIAGNOSIS — J45.909 ASTHMA, UNSPECIFIED ASTHMA SEVERITY, UNSPECIFIED WHETHER COMPLICATED, UNSPECIFIED WHETHER PERSISTENT: ICD-10-CM

## 2024-02-05 PROCEDURE — 3288F FALL RISK ASSESSMENT DOCD: CPT | Mod: CPTII,S$GLB,, | Performed by: FAMILY MEDICINE

## 2024-02-05 PROCEDURE — 1157F ADVNC CARE PLAN IN RCRD: CPT | Mod: CPTII,S$GLB,, | Performed by: FAMILY MEDICINE

## 2024-02-05 PROCEDURE — 1160F RVW MEDS BY RX/DR IN RCRD: CPT | Mod: CPTII,S$GLB,, | Performed by: FAMILY MEDICINE

## 2024-02-05 PROCEDURE — 1159F MED LIST DOCD IN RCRD: CPT | Mod: CPTII,S$GLB,, | Performed by: FAMILY MEDICINE

## 2024-02-05 PROCEDURE — 1101F PT FALLS ASSESS-DOCD LE1/YR: CPT | Mod: CPTII,S$GLB,, | Performed by: FAMILY MEDICINE

## 2024-02-05 PROCEDURE — 3078F DIAST BP <80 MM HG: CPT | Mod: CPTII,S$GLB,, | Performed by: FAMILY MEDICINE

## 2024-02-05 PROCEDURE — 3074F SYST BP LT 130 MM HG: CPT | Mod: CPTII,S$GLB,, | Performed by: FAMILY MEDICINE

## 2024-02-05 PROCEDURE — 1126F AMNT PAIN NOTED NONE PRSNT: CPT | Mod: CPTII,S$GLB,, | Performed by: FAMILY MEDICINE

## 2024-02-05 PROCEDURE — 99397 PER PM REEVAL EST PAT 65+ YR: CPT | Mod: S$GLB,,, | Performed by: FAMILY MEDICINE

## 2024-02-05 RX ORDER — TRIAMCINOLONE ACETONIDE 1 MG/G
CREAM TOPICAL 2 TIMES DAILY
Qty: 453.6 G | Refills: 0 | Status: SHIPPED | OUTPATIENT
Start: 2024-02-05

## 2024-02-05 RX ORDER — PRAVASTATIN SODIUM 20 MG/1
20 TABLET ORAL NIGHTLY
Qty: 90 TABLET | Refills: 3 | Status: SHIPPED | OUTPATIENT
Start: 2024-02-05

## 2024-02-05 RX ORDER — OLMESARTAN MEDOXOMIL 5 MG/1
5 TABLET ORAL DAILY
Qty: 90 TABLET | Refills: 3 | Status: SHIPPED | OUTPATIENT
Start: 2024-02-05

## 2024-02-05 NOTE — PROGRESS NOTES
Subjective:       Patient ID: Cassidy Stanford is a 78 y.o. female.    Chief Complaint: Annual Exam and Rash    HPI  The patient is a 78-year-old who is here today for her annual exam.  Overall, she is doing well and tells me that she is doing really good.  She is staying busy with volunteer activities.  She goes to the gym regularly.  She recently had fasting labs which reviewed today.  Her mammogram was last done in October.  Her colonoscopy is up-to-date but we do need those records.    Today we discussed the followin)  Hypertension.  She is currently taking Benicar consistently.  Today her blood pressure is 128/68.  She hardly ever checks her blood pressure at home but when she does it is normal.   2)  Hyperlipidemia.  She is taking her Pravachol consistently.  Her recent total cholesterol was 163, triglycerides were 63 and her LDL was 90 (down from prior value of 106).  3) pre diabetes.  Her recent A1c was 5.7 which is stable compared to prior readings    4) asthma and COPD.  She does continue to see her pulmonologist yearly.  She is currently taking Trelegy and Singulair consistently and albuterol as needed.  She does have a persistent cough but it is mostly manageable.    5) rash.  She has a rash on her chest that has been present since the end of October.  The rash is itchy.  She has tried a variety of things which have not helped.        Review of Systems   Constitutional:  Negative for appetite change, chills, diaphoresis, fatigue, fever and unexpected weight change.   HENT:  Negative for congestion, dental problem, ear pain, hearing loss, postnasal drip, rhinorrhea, sneezing, sore throat and trouble swallowing.    Eyes:  Negative for photophobia, pain, discharge and visual disturbance.   Respiratory:  Positive for cough. Negative for chest tightness, shortness of breath and wheezing.    Cardiovascular:  Negative for chest pain, palpitations and leg swelling.   Gastrointestinal:  Negative for  abdominal distention, abdominal pain, blood in stool, constipation, diarrhea, nausea and vomiting.   Endocrine: Negative for cold intolerance, heat intolerance, polydipsia and polyuria.   Genitourinary:  Negative for dysuria, flank pain, frequency, genital sores, hematuria, menstrual problem and vaginal discharge.   Musculoskeletal:  Negative for arthralgias, joint swelling and myalgias.   Skin:  Positive for rash.   Neurological:  Negative for dizziness, syncope, light-headedness and headaches.   Hematological:  Negative for adenopathy. Does not bruise/bleed easily.   Psychiatric/Behavioral:  Negative for dysphoric mood, self-injury, sleep disturbance and suicidal ideas. The patient is not nervous/anxious.          Objective:      Physical Exam  Constitutional:       General: She is not in acute distress.     Appearance: Normal appearance. She is well-developed.   HENT:      Head: Normocephalic and atraumatic.      Right Ear: Hearing, tympanic membrane, ear canal and external ear normal.      Left Ear: Hearing, tympanic membrane, ear canal and external ear normal.      Nose: Nose normal.      Mouth/Throat:      Mouth: No oral lesions.      Pharynx: No oropharyngeal exudate or posterior oropharyngeal erythema.   Eyes:      General: Lids are normal. No scleral icterus.     Extraocular Movements: Extraocular movements intact.      Conjunctiva/sclera: Conjunctivae normal.      Pupils: Pupils are equal, round, and reactive to light.   Neck:      Thyroid: No thyroid mass or thyromegaly.      Vascular: No carotid bruit.   Cardiovascular:      Rate and Rhythm: Normal rate and regular rhythm. No extrasystoles are present.     Chest Wall: PMI is not displaced.      Heart sounds: Normal heart sounds. No murmur heard.     No gallop.   Pulmonary:      Effort: Pulmonary effort is normal. No accessory muscle usage or respiratory distress.      Breath sounds: Normal breath sounds.   Abdominal:      General: Bowel sounds are  "normal. There is no abdominal bruit.      Palpations: Abdomen is soft.      Tenderness: There is no abdominal tenderness. There is no rebound.   Musculoskeletal:      Cervical back: Normal range of motion and neck supple.   Lymphadenopathy:      Head:      Right side of head: No submental or submandibular adenopathy.      Left side of head: No submental or submandibular adenopathy.      Cervical:      Right cervical: No superficial, deep or posterior cervical adenopathy.     Left cervical: No superficial, deep or posterior cervical adenopathy.      Upper Body:      Right upper body: No supraclavicular adenopathy.      Left upper body: No supraclavicular adenopathy.   Skin:     General: Skin is warm and dry.      Findings: Rash (on the upper chest) present. Rash is macular and papular.   Neurological:      Mental Status: She is alert and oriented to person, place, and time.      Cranial Nerves: No cranial nerve deficit.      Sensory: No sensory deficit.   Psychiatric:         Speech: Speech normal.         Behavior: Behavior normal.         Thought Content: Thought content normal.       Blood pressure 128/68, pulse 68, temperature 97.9 °F (36.6 °C), resp. rate 16, height 4' 11" (1.499 m), weight 57.3 kg (126 lb 5.2 oz), SpO2 97 %.Body mass index is 25.51 kg/m².              A/P:  1) annual exam.  Health maintenance issues and anticipatory guidance issues were discussed.  She will continue stay physically active and consume a healthy diet.  We will check labs again in 1 year.  She will continue with her yearly mammograms.  We will request her last colonoscopy report for our records.  She will consider RSV and COVID vaccination  2)  Hypertension.  Well controlled.  Continue with Benicar  3)  Hyperlipidemia.  Well controlled.  Continue with Pravachol.  We will check a fasting lipid profile again in 1 year  4) pre diabetes.  Asymptomatic and stable.  We will recheck an A1c again in 1 year   5) asthma and COPD.  Stable.  " Continue with trelegy and Singulair under the direction of pulmonology  6) rash.  Persistent.  I am going to give her triamcinolone cream to be used twice a day for 14 days.  If the rash does not resolve or if it worsens, she will see Dermatology  7) minimal carotid artery disease.  Asymptomatic.  We will check an ultrasound next year        As long as she does well, I will see her back in 1 year sooner if needed

## 2024-07-13 PROBLEM — S72.001A CLOSED FRACTURE OF RIGHT HIP: Status: ACTIVE | Noted: 2024-07-13

## 2024-07-13 PROBLEM — S72.141A CLOSED 2-PART INTERTROCHANTERIC FRACTURE OF PROXIMAL END OF RIGHT FEMUR: Status: ACTIVE | Noted: 2024-07-13

## 2024-07-14 PROBLEM — Z74.09 REDUCED MOBILITY: Status: ACTIVE | Noted: 2024-07-14

## 2024-07-15 PROBLEM — D62 ACUTE BLOOD LOSS ANEMIA: Status: ACTIVE | Noted: 2024-07-15

## 2024-09-03 ENCOUNTER — OFFICE VISIT (OUTPATIENT)
Dept: PULMONOLOGY | Facility: CLINIC | Age: 79
End: 2024-09-03
Payer: MEDICARE

## 2024-09-03 VITALS
OXYGEN SATURATION: 99 % | DIASTOLIC BLOOD PRESSURE: 66 MMHG | SYSTOLIC BLOOD PRESSURE: 138 MMHG | WEIGHT: 125.88 LBS | BODY MASS INDEX: 25.43 KG/M2 | HEART RATE: 69 BPM

## 2024-09-03 DIAGNOSIS — J44.89 ASTHMA-COPD OVERLAP SYNDROME: Primary | ICD-10-CM

## 2024-09-03 DIAGNOSIS — Z91.09 ENVIRONMENTAL ALLERGIES: ICD-10-CM

## 2024-09-03 PROCEDURE — 99999 PR PBB SHADOW E&M-EST. PATIENT-LVL IV: CPT | Mod: PBBFAC,,, | Performed by: INTERNAL MEDICINE

## 2024-09-03 PROCEDURE — 3288F FALL RISK ASSESSMENT DOCD: CPT | Mod: CPTII,S$GLB,, | Performed by: INTERNAL MEDICINE

## 2024-09-03 PROCEDURE — 1157F ADVNC CARE PLAN IN RCRD: CPT | Mod: CPTII,S$GLB,, | Performed by: INTERNAL MEDICINE

## 2024-09-03 PROCEDURE — 3078F DIAST BP <80 MM HG: CPT | Mod: CPTII,S$GLB,, | Performed by: INTERNAL MEDICINE

## 2024-09-03 PROCEDURE — 1159F MED LIST DOCD IN RCRD: CPT | Mod: CPTII,S$GLB,, | Performed by: INTERNAL MEDICINE

## 2024-09-03 PROCEDURE — 3075F SYST BP GE 130 - 139MM HG: CPT | Mod: CPTII,S$GLB,, | Performed by: INTERNAL MEDICINE

## 2024-09-03 PROCEDURE — 99214 OFFICE O/P EST MOD 30 MIN: CPT | Mod: S$GLB,,, | Performed by: INTERNAL MEDICINE

## 2024-09-03 PROCEDURE — 1100F PTFALLS ASSESS-DOCD GE2>/YR: CPT | Mod: CPTII,S$GLB,, | Performed by: INTERNAL MEDICINE

## 2024-09-03 PROCEDURE — 1126F AMNT PAIN NOTED NONE PRSNT: CPT | Mod: CPTII,S$GLB,, | Performed by: INTERNAL MEDICINE

## 2024-09-03 NOTE — PROGRESS NOTES
9/3/2024    Cassidy Stanford  Follow up    Chief Complaint   Patient presents with    Cough     Annual visit/Chronic cough - sputum in morning is heavy and eases up later during the day       HPI:   09/03/2024- pt here for yearly f/u, no change. Cough/mucous daily. Continues on trelegy. She is sensitive to different allergens and scents  She had a hip fracture, had ORIF, doing PT    08/22/2023-   Continue trelegy inhaler one puff daily  Try albuterol inhaler as needed for coughing spells  Continue singulair daily  pt having more cough this year, worse in am, productive pale yellow mucous varied quantity, always there and persistent, some days better than others. She continues to take trelegy daily. Has not been using albuterol. She denies exacerbations.   She did have covid twice, not severe- just lost taste and smell, years ago.  She goes to gym daily M-F, 1 hr exercise    08/18/2022- needs refill trelegy and singulair  Has been using breo/incruse. Uses rescue inhaler rarely- admits not using enough  She coughs a lot, sometimes wheezes, attributes to weather/allergy  Denies night wakings, denies exacerbation since last visit.    2/17/22-   Stop breo and start trelegy for asthma- one puff daily, rinse mouth after use  Cough suspect due to bad COPD/asthma, will have to manage it and may get better with stronger medicine  Get PFT  Consider trial of stopping benicar 2 wks to see if the cough gets better    Pt is a 75 yo female with osteoporosis, allergies and asthma (COPD?- no pft available)establishing care. Dr. Holland's notes reviewed- last visit 6/18/21.  Used to use incruse  Advair in past  Current regimen breo inhaler daily, singulair in evening. Hardly ever uses albuterol. Denies exacerbations  Never uses nebulizer any more- few yrs ago was much worse and then got better controlled.  Coughs a lot- lots of mucous early am white/pale yellow, gradually decreases through the day. Wheezes sometimes. Exercises every am-  elliptical 2 mi then weights, no SOB.  Childhood asthma, then came back in 2016.  Mother and father smoked in the house- lots of secondhand smoke but she never smoked.  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  Gets allergies- triggers dust and yard work. She gets out of the house while  cleans house due to severe dust allergy. Used to have cat and felt allergic to cat. No pets now.    The chief complaint problem is stable    PFSH:  Past Medical History:   Diagnosis Date    Allergy     Arthritis     Asthma     COPD (chronic obstructive pulmonary disease)     moderate to severe on PFTs 2/16 (exposure to parental smoke only) (AAT neg)    Gallbladder polyp     repeat us 1/25    H/O cardiovascular stress test     normal May 2017    Hyperlipidemia     Hypertension     Osteopenia     dexa 7/14 and 5/17    Osteoporosis     dexa 12/20    Prediabetes          Past Surgical History:   Procedure Laterality Date    BREAST BIOPSY Left 2003    core, benign    INTRAMEDULLARY RODDING OF TROCHANTER OF FEMUR Right 7/13/2024    Procedure: INSERTION, INTRAMEDULLARY LATOYA, FEMUR, TROCHANTER;  Surgeon: Se Sanders MD;  Location: [facility];  Service: Orthopedics;  Laterality: Right;    R knee scope      2013    skin grafts x 2       Social History     Tobacco Use    Smoking status: Never    Smokeless tobacco: Never   Substance Use Topics    Alcohol use: Yes     Alcohol/week: 0.0 standard drinks of alcohol     Comment: rarely    Drug use: No     Family History   Problem Relation Name Age of Onset    Cancer Mother  80        colon    Heart attack Father  66        MI SCD    Hypertension Sister      Glaucoma Neg Hx      Macular degeneration Neg Hx       Review of patient's allergies indicates:   Allergen Reactions    House dust mite     Penicillins      Other reaction(s): Rash    Sulfa (sulfonamide antibiotics) Swelling     Other reaction(s): Unknown       Performance Status:The patient's activity level is  regular exercise.      Review of Systems:  a review of eleven systems covering constitutional, Eye, HEENT, Psych, Respiratory, Cardiac, GI, , Musculoskeletal, Endocrine, Dermatologic was negative except for pertinent findings as listed ABOVE and below:  All negative with pertinent positives as above        Exam:Comprehensive exam done. /66   Pulse 69   Wt 57.1 kg (125 lb 14.1 oz)   SpO2 99% Comment: Room air at rest  BMI 25.43 kg/m²   Exam included Vitals as listed, and patient's appearance and affect and alertness and mood, oral exam for yeast and hygiene and pharynx lesions and Mallapatti (M) score, neck with inspection for jvd and masses and thyroid abnormalities and lymph nodes (supraclavicular and infraclavicular nodes and axillary also examined and noted if abn), chest exam included symmetry and effort and fremitus and percussion and auscultation, cardiac exam included rhythm and gallops and murmur and rubs and jvd and edema, abdominal exam for mass and hepatosplenomegaly and tenderness and hernias and bowel sounds, Musculoskeletal exam with muscle tone and posture and mobility/gait and  strength, and skin for rashes and cyanosis and pallor and turgor, extremity for clubbing.  Findings were normal except for pertinent findings listed below:  Oropharynx clear, M1  HR regular  L wheezes, R lung clear  No clubbing/edema    Radiographs (ct chest and cxr) reviewed: view by direct vision   cXR 7/13/24- clear lungs  CXR 11/2020- clear lungs    Labs reviewed    Lab Results   Component Value Date    WBC 8.69 07/15/2024    HGB 9.5 (L) 07/15/2024    HCT 28.6 (L) 07/15/2024    MCV 94 07/15/2024     07/15/2024       CMP  Sodium   Date Value Ref Range Status   07/14/2024 136 136 - 145 mmol/L Final     Potassium   Date Value Ref Range Status   07/14/2024 3.8 3.5 - 5.1 mmol/L Final     Comment:     Anion Gap reference range revised on 4/28/2023     Chloride   Date Value Ref Range Status   07/14/2024 103  95 - 110 mmol/L Final     CO2   Date Value Ref Range Status   07/14/2024 28 22 - 31 mmol/L Final     Glucose   Date Value Ref Range Status   07/14/2024 128 (H) 70 - 110 mg/dL Final     Comment:     The ADA recommends the following guidelines for fasting glucose:    Normal:       less than 100 mg/dL    Prediabetes:  100 mg/dL to 125 mg/dL    Diabetes:     126 mg/dL or higher       BUN   Date Value Ref Range Status   07/14/2024 21 (H) 7 - 18 mg/dL Final     Creatinine   Date Value Ref Range Status   07/14/2024 0.88 0.50 - 1.40 mg/dL Final     Calcium   Date Value Ref Range Status   07/14/2024 8.4 8.4 - 10.2 mg/dL Final     Total Protein   Date Value Ref Range Status   07/14/2024 6.2 6.0 - 8.4 g/dL Final     Albumin   Date Value Ref Range Status   07/14/2024 3.7 3.5 - 5.2 g/dL Final     Total Bilirubin   Date Value Ref Range Status   07/14/2024 0.5 0.2 - 1.3 mg/dL Final     Alkaline Phosphatase   Date Value Ref Range Status   07/14/2024 53 38 - 145 U/L Final     AST   Date Value Ref Range Status   07/14/2024 31 14 - 36 U/L Final     ALT   Date Value Ref Range Status   07/14/2024 21 0 - 35 U/L Final     Anion Gap   Date Value Ref Range Status   07/14/2024 5 5 - 12 mmol/L Final     Comment:     Anion Gap reference range revised on 4/28/2023     eGFR   Date Value Ref Range Status   07/14/2024 >60 >60 mL/min/1.73 m^2 Final       Results for COLTON GEORGE (MRN [id number]) as of 2/17/2022 09:31   Ref. Range 11/28/2017 08:02 11/28/2018 07:37 12/7/2019 07:46 12/17/2020 08:06 1/19/2022 07:28   Eos # Latest Ref Range: 0.0 - 0.5 K/uL 0.2 0.3 0.4 0.2 0.2     Results for COLTON GEORGE (MRN [id number]) as of 2/17/2022 09:31   Ref. Range 2/8/2016 11:29   IgG Latest Ref Range: 650 - 1600 mg/dL 938   IgM Latest Ref Range: 50 - 300 mg/dL 70   IgA Latest Ref Range: 40 - 350 mg/dL 155   IgE Latest Ref Range: 0 - 100 IU/mL 122 (H)   IgG 1 Latest Ref Range: 490 - 1140 mg/dL 508   IgG 2 Latest Ref Range: 150 - 640 mg/dL 321   IgG 3 Latest Ref  Range: 11 - 85 mg/dL 32   IgG 4 Latest Ref Range: 3 - 201 mg/dL 31       PFT    reviewed 2/21/22- mild obstruction, nonsignificant bd response, hyperinflation, normal dlco      Plan:  Clinical impression is apparently straight forward and impression with management as below. COPD/asthma, not in exacerbation, adequate control currently.     Cassidy was seen today for cough.    Diagnoses and all orders for this visit:    Asthma-COPD overlap syndrome    Environmental allergies            Follow up in about 1 year (around 9/3/2025).    Discussed with patient above for education the following:      Patient Instructions   Continue trelegy inhaler  Albuterol inhaler as needed

## 2024-09-05 ENCOUNTER — PATIENT MESSAGE (OUTPATIENT)
Dept: ADMINISTRATIVE | Facility: HOSPITAL | Age: 79
End: 2024-09-05
Payer: MEDICARE

## 2024-09-05 ENCOUNTER — LAB VISIT (OUTPATIENT)
Dept: LAB | Facility: HOSPITAL | Age: 79
End: 2024-09-05
Payer: MEDICARE

## 2024-09-05 DIAGNOSIS — M81.0 AGE-RELATED OSTEOPOROSIS WITHOUT CURRENT PATHOLOGICAL FRACTURE: Primary | ICD-10-CM

## 2024-09-05 LAB
25(OH)D3+25(OH)D2 SERPL-MCNC: 55 NG/ML (ref 30–96)
ALBUMIN SERPL BCP-MCNC: 3.6 G/DL (ref 3.5–5.2)
ALP SERPL-CCNC: 84 U/L (ref 55–135)
ALT SERPL W/O P-5'-P-CCNC: 12 U/L (ref 10–44)
ANION GAP SERPL CALC-SCNC: 8 MMOL/L (ref 8–16)
AST SERPL-CCNC: 18 U/L (ref 10–40)
BILIRUB SERPL-MCNC: 0.2 MG/DL (ref 0.1–1)
BUN SERPL-MCNC: 19 MG/DL (ref 8–23)
CALCIUM SERPL-MCNC: 9.7 MG/DL (ref 8.7–10.5)
CHLORIDE SERPL-SCNC: 106 MMOL/L (ref 95–110)
CO2 SERPL-SCNC: 25 MMOL/L (ref 23–29)
CREAT SERPL-MCNC: 0.8 MG/DL (ref 0.5–1.4)
EST. GFR  (NO RACE VARIABLE): >60 ML/MIN/1.73 M^2
GLUCOSE SERPL-MCNC: 92 MG/DL (ref 70–110)
POTASSIUM SERPL-SCNC: 4.3 MMOL/L (ref 3.5–5.1)
PROT SERPL-MCNC: 6.8 G/DL (ref 6–8.4)
PTH-INTACT SERPL-MCNC: 35.2 PG/ML (ref 9–77)
SODIUM SERPL-SCNC: 139 MMOL/L (ref 136–145)

## 2024-09-05 PROCEDURE — 80053 COMPREHEN METABOLIC PANEL: CPT | Performed by: ORTHOPAEDIC SURGERY

## 2024-09-05 PROCEDURE — 83970 ASSAY OF PARATHORMONE: CPT | Performed by: ORTHOPAEDIC SURGERY

## 2024-09-05 PROCEDURE — 82306 VITAMIN D 25 HYDROXY: CPT | Performed by: ORTHOPAEDIC SURGERY

## 2024-09-05 PROCEDURE — 36415 COLL VENOUS BLD VENIPUNCTURE: CPT | Mod: PO | Performed by: ORTHOPAEDIC SURGERY

## 2024-09-06 ENCOUNTER — PATIENT OUTREACH (OUTPATIENT)
Dept: ADMINISTRATIVE | Facility: HOSPITAL | Age: 79
End: 2024-09-06
Payer: MEDICARE

## 2024-09-06 DIAGNOSIS — Z12.31 ENCOUNTER FOR SCREENING MAMMOGRAM FOR MALIGNANT NEOPLASM OF BREAST: Primary | ICD-10-CM

## 2024-09-06 NOTE — PROGRESS NOTES
Population Health Chart Review & Patient Outreach Details      Additional [facility] Health Notes:               Updates Requested / Reviewed:      Updated Care Coordination Note and Immunizations Reconciliation Completed or Queried: [facility] Topics Overdue:      [facility] Score: 0     Patient is not due for any topics at this time.    RSV Vaccine                  Health Maintenance Topic(s) Outreach Outcomes & Actions Taken:    Breast Cancer Screening - Outreach Outcomes & Actions Taken  : Mammogram Order Placed and Mammogram Screening Scheduled

## 2024-10-11 ENCOUNTER — HOSPITAL ENCOUNTER (OUTPATIENT)
Dept: RADIOLOGY | Facility: HOSPITAL | Age: 79
Discharge: HOME | End: 2024-10-11
Attending: FAMILY MEDICINE
Payer: MEDICARE

## 2024-10-11 DIAGNOSIS — Z12.31 ENCOUNTER FOR SCREENING MAMMOGRAM FOR MALIGNANT NEOPLASM OF BREAST: ICD-10-CM

## 2024-10-11 PROCEDURE — 77063 BREAST TOMOSYNTHESIS BI: CPT | Mod: 26,,, | Performed by: RADIOLOGY

## 2024-10-11 PROCEDURE — 77067 SCR MAMMO BI INCL CAD: CPT | Mod: 26,,, | Performed by: RADIOLOGY

## 2024-10-11 PROCEDURE — 77067 SCR MAMMO BI INCL CAD: CPT | Mod: TC,PO

## 2024-10-13 ENCOUNTER — PATIENT MESSAGE (OUTPATIENT)
Dept: FAMILY MEDICINE | Facility: CLINIC | Age: 79
End: 2024-10-13
Payer: MEDICARE

## 2024-10-14 ENCOUNTER — TELEPHONE (OUTPATIENT)
Dept: RADIOLOGY | Facility: HOSPITAL | Age: 79
End: 2024-10-14
Payer: MEDICARE

## 2024-10-16 DIAGNOSIS — J44.9 CHRONIC OBSTRUCTIVE PULMONARY DISEASE, UNSPECIFIED COPD TYPE: ICD-10-CM

## 2024-10-16 RX ORDER — FLUTICASONE FUROATE, UMECLIDINIUM BROMIDE AND VILANTEROL TRIFENATATE 200; 62.5; 25 UG/1; UG/1; UG/1
POWDER RESPIRATORY (INHALATION)
Qty: 180 EACH | Refills: 3 | Status: SHIPPED | OUTPATIENT
Start: 2024-10-16

## 2024-10-16 RX ORDER — MONTELUKAST SODIUM 10 MG/1
10 TABLET ORAL NIGHTLY
Qty: 90 TABLET | Refills: 3 | Status: SHIPPED | OUTPATIENT
Start: 2024-10-16

## 2024-10-18 ENCOUNTER — HOSPITAL ENCOUNTER (OUTPATIENT)
Dept: RADIOLOGY | Facility: HOSPITAL | Age: 79
Discharge: HOME | End: 2024-10-18
Attending: FAMILY MEDICINE
Payer: MEDICARE

## 2024-10-18 DIAGNOSIS — R92.8 ABNORMALITY OF LEFT BREAST ON SCREENING MAMMOGRAM: ICD-10-CM

## 2024-10-18 PROCEDURE — 77061 BREAST TOMOSYNTHESIS UNI: CPT | Mod: 26,LT,, | Performed by: RADIOLOGY

## 2024-10-18 PROCEDURE — 76642 ULTRASOUND BREAST LIMITED: CPT | Mod: 26,LT,, | Performed by: RADIOLOGY

## 2024-10-18 PROCEDURE — 76642 ULTRASOUND BREAST LIMITED: CPT | Mod: TC,PO,LT

## 2024-10-18 PROCEDURE — 77065 DX MAMMO INCL CAD UNI: CPT | Mod: 26,LT,, | Performed by: RADIOLOGY

## 2024-10-18 PROCEDURE — 77065 DX MAMMO INCL CAD UNI: CPT | Mod: TC,PO,LT

## 2024-10-18 PROCEDURE — 77061 BREAST TOMOSYNTHESIS UNI: CPT | Mod: TC,PO,LT

## 2024-10-20 ENCOUNTER — PATIENT MESSAGE (OUTPATIENT)
Dept: FAMILY MEDICINE | Facility: CLINIC | Age: 79
End: 2024-10-20
Payer: MEDICARE

## 2024-12-13 ENCOUNTER — PATIENT MESSAGE (OUTPATIENT)
Dept: OPTOMETRY | Facility: CLINIC | Age: 79
End: 2024-12-13
Payer: MEDICARE

## 2025-01-29 ENCOUNTER — PATIENT MESSAGE (OUTPATIENT)
Dept: FAMILY MEDICINE | Facility: CLINIC | Age: 80
End: 2025-01-29
Payer: MEDICARE

## 2025-01-29 DIAGNOSIS — E78.5 HYPERLIPIDEMIA, UNSPECIFIED HYPERLIPIDEMIA TYPE: ICD-10-CM

## 2025-01-29 DIAGNOSIS — Z00.00 ANNUAL PHYSICAL EXAM: Primary | ICD-10-CM

## 2025-01-29 DIAGNOSIS — I10 HYPERTENSION, ESSENTIAL: ICD-10-CM

## 2025-01-29 DIAGNOSIS — R73.03 PREDIABETES: ICD-10-CM

## 2025-01-30 ENCOUNTER — OFFICE VISIT (OUTPATIENT)
Dept: OPTOMETRY | Facility: CLINIC | Age: 80
End: 2025-01-30
Payer: MEDICARE

## 2025-01-30 DIAGNOSIS — H25.13 NUCLEAR SCLEROSIS, BILATERAL: Primary | ICD-10-CM

## 2025-01-30 DIAGNOSIS — H35.363 PERIPHERAL DRUSEN, BILATERAL: ICD-10-CM

## 2025-01-30 DIAGNOSIS — H43.813 POSTERIOR VITREOUS DETACHMENT, BILATERAL: ICD-10-CM

## 2025-01-30 DIAGNOSIS — Z13.5 GLAUCOMA SCREENING: ICD-10-CM

## 2025-01-30 DIAGNOSIS — H52.4 MYOPIA WITH ASTIGMATISM AND PRESBYOPIA, BILATERAL: ICD-10-CM

## 2025-01-30 DIAGNOSIS — H52.203 MYOPIA WITH ASTIGMATISM AND PRESBYOPIA, BILATERAL: ICD-10-CM

## 2025-01-30 DIAGNOSIS — H52.13 MYOPIA WITH ASTIGMATISM AND PRESBYOPIA, BILATERAL: ICD-10-CM

## 2025-01-30 PROCEDURE — 99999 PR PBB SHADOW E&M-EST. PATIENT-LVL III: CPT | Mod: PBBFAC,,, | Performed by: OPTOMETRIST

## 2025-01-30 PROCEDURE — 1126F AMNT PAIN NOTED NONE PRSNT: CPT | Mod: CPTII,S$GLB,, | Performed by: OPTOMETRIST

## 2025-01-30 PROCEDURE — 1159F MED LIST DOCD IN RCRD: CPT | Mod: CPTII,S$GLB,, | Performed by: OPTOMETRIST

## 2025-01-30 PROCEDURE — 92014 COMPRE OPH EXAM EST PT 1/>: CPT | Mod: S$GLB,,, | Performed by: OPTOMETRIST

## 2025-01-30 PROCEDURE — 92250 FUNDUS PHOTOGRAPHY W/I&R: CPT | Mod: S$GLB,,, | Performed by: OPTOMETRIST

## 2025-01-30 PROCEDURE — 1100F PTFALLS ASSESS-DOCD GE2>/YR: CPT | Mod: CPTII,S$GLB,, | Performed by: OPTOMETRIST

## 2025-01-30 PROCEDURE — 1157F ADVNC CARE PLAN IN RCRD: CPT | Mod: CPTII,S$GLB,, | Performed by: OPTOMETRIST

## 2025-01-30 PROCEDURE — 3288F FALL RISK ASSESSMENT DOCD: CPT | Mod: CPTII,S$GLB,, | Performed by: OPTOMETRIST

## 2025-01-30 PROCEDURE — 92015 DETERMINE REFRACTIVE STATE: CPT | Mod: S$GLB,,, | Performed by: OPTOMETRIST

## 2025-01-30 NOTE — PROGRESS NOTES
HPI    Routine -dle- 6/23     Pt complains of some va changes at times. Takes glasses off to read.   Needing updated glasses rx. Denies any flashes or floaters. No gtts.   Last edited by Merissa Ruiz on 1/30/2025  9:20 AM.            Assessment /Plan     For exam results, see Encounter Report.    Nuclear sclerosis, bilateral    Posterior vitreous detachment, bilateral    Peripheral drusen, bilateral  -     Color Fundus Photography - OU - Both Eyes    Glaucoma screening    Myopia with astigmatism and presbyopia, bilateral      Vis sig NS, borderline ready for consult, gave info, cautions driving---CE possible 2-3 yrs ---pt wary about CE due to h/o keloid scarring   RD precautions given and reviewed. Patient knows to call/ message if any further changes in symptoms occur.  OPTOS 1/2025 ---moderate peripheral drusen, OS>OD ---monitor   Moderate -large c/d OD>OS --appears stable on optos, angles open but shallow, IOP low normal ---monitor all   Gave copy of specs, fill prn --discussed no big changes     Discussed and educated patient on current findings /plan.  RTC 1 year w/ OCT rnfl, prn if any changes / issues

## 2025-01-30 NOTE — PATIENT INSTRUCTIONS
"DRY EYES -- BURNING OR REJI SYMPTOMS:  Use Over The Counter artificial tears as needed for dry eye symptoms.   Some common brands include:  Systane, Optive, Refresh, and Thera-Tears.  These drops can be used as frequently as desired, but may be most helpful use during long periods of concentrated work.  For example, reading / working at the computer. Start with 3-4x per day.     Nighttime Ophthalmic gel or ointments are available: Refresh PM, Genteal, and Lacrilube.    Avoid drops that "get redness out" (Visine, Murine, Clear Eyes), as these may contain medication that could further irritate the eyes, especially with chronic use.    ALLERGY EYES -- ITCHING SYMPTOMS:  Over the counter medications include--Pataday, Zaditor, and Alaway.  Use as directed 1-2 drops daily for symptoms of itching / watering eyes.  These drops will not help for dry eye or exposure symptoms.    REDNESS RELIEF:  Lumify---is a good redness reliever that will not cause irritation if used chronically.        FLASHES / FLOATERS / POSTERIOR VITREOUS DETACHMENT    Call the clinic if you have any further changes in symptoms.  Including:  Increased numbers of floaters or flashing lights, dimness or darkness that moves through or stays constant in your vision, or any pain in the eye (s).    You may sometimes see small specks or clouds moving in your field of vision.  They are called FLOATERS.  You can often see them when looking at a plain background, like a blank wall or blue bonnie.  Floaters are actually tiny clumps of gel or cells inside the VITREOUS, the clear jelly-like fluid that fills the inside of your eye.    While these objects look like they are in front of your eye, they are actually floating inside.  What you see are the shadows they cast on the RETINA, the nerve layer at the back of the eye that senses light and allows you to see.      POSTERIOR VITREOUS DETACHMENT    The appearance of new floaters may be alarming.  If you suddenly " develop new floaters, you should contact your eye care professional  right away.    The retina can tear if the shrinking vitreous pulls away from the wall of the eye.  This sometimes causes a small amount of bleeding in the eye that may appear as new floaters.    A torn retina is always a serious problem, since it can lead to a retinal detachment.  You should see your eye care professional as soon as possible if:    even one new floater appears suddenly;  you see sudden flashes of light;  you notice other symptoms, like the loss of side vision, or a curtain closes down in your vision        POSTERIOR VITREOUS DETACHMENT is more common for people who:    are nearsighted;  have had cataract surgery;  have had YAG laser surgery of the eye;  have had inflammation inside the eye;  are over age 60.      While some floaters may remain visible, many of them will fade over time and become less noticeable.  Even if you've had some floaters for years, you should have your eyes checked as soon as possible if you notice new ones.    FLASHING LIGHTS    When the vitreous gel rubs or pulls on the retina, you may see what look like flashing lights or lightning streaks.  These flashes can appear off and on for several weeks or months.      Some people experience flashes of light that appear as jagged lines or heat waves in both eyes, lasting 10-20 minutes.  These flashes are caused by a spasm of blood vessels in the brain, which is called a migraine.    If a headache follows these flashes, it's called a migraine headache.  If   no headache occurs, these flashes are called Ophthalmic or Ocular Migraine.           Early Cataracts--not visually significant for surgery consultation.    What Are Cataracts?  A clear lens in the eye focuses light. This lets the eye see images sharply. With age, the lens slowly becomes cloudy. The cloudy lens is a cataract. A cataract scatters light and makes it hard for the eye to focus. Cataracts often  form in both eyes. But one lens may cloud faster than the other.      The Aging of Your Lens    Your lens may cloud so slowly that you don`t notice any vision changes at first. But as the cataract gets worse, the eye has a harder time focusing. In early stages, glasses may help you see better. As the lens gets cloudier, your doctor may recommend surgery to restore your vision.

## 2025-02-12 ENCOUNTER — LAB VISIT (OUTPATIENT)
Dept: LAB | Facility: HOSPITAL | Age: 80
End: 2025-02-12
Attending: FAMILY MEDICINE
Payer: MEDICARE

## 2025-02-12 DIAGNOSIS — R73.03 PREDIABETES: ICD-10-CM

## 2025-02-12 DIAGNOSIS — I10 HYPERTENSION, ESSENTIAL: ICD-10-CM

## 2025-02-12 DIAGNOSIS — E78.5 HYPERLIPIDEMIA, UNSPECIFIED HYPERLIPIDEMIA TYPE: ICD-10-CM

## 2025-02-12 DIAGNOSIS — Z00.00 ANNUAL PHYSICAL EXAM: ICD-10-CM

## 2025-02-12 LAB
ALBUMIN SERPL BCP-MCNC: 4 G/DL (ref 3.5–5.2)
ALP SERPL-CCNC: 65 U/L (ref 40–150)
ALT SERPL W/O P-5'-P-CCNC: 14 U/L (ref 10–44)
ANION GAP SERPL CALC-SCNC: 9 MMOL/L (ref 8–16)
AST SERPL-CCNC: 21 U/L (ref 10–40)
BASOPHILS # BLD AUTO: 0.06 K/UL (ref 0–0.2)
BASOPHILS NFR BLD: 1.4 % (ref 0–1.9)
BILIRUB SERPL-MCNC: 0.5 MG/DL (ref 0.1–1)
BUN SERPL-MCNC: 15 MG/DL (ref 8–23)
CALCIUM SERPL-MCNC: 9.6 MG/DL (ref 8.7–10.5)
CHLORIDE SERPL-SCNC: 107 MMOL/L (ref 95–110)
CHOLEST SERPL-MCNC: 172 MG/DL (ref 120–199)
CHOLEST/HDLC SERPL: 3.1 {RATIO} (ref 2–5)
CO2 SERPL-SCNC: 25 MMOL/L (ref 23–29)
CREAT SERPL-MCNC: 0.8 MG/DL (ref 0.5–1.4)
DIFFERENTIAL METHOD BLD: ABNORMAL
EOSINOPHIL # BLD AUTO: 0.3 K/UL (ref 0–0.5)
EOSINOPHIL NFR BLD: 6.2 % (ref 0–8)
ERYTHROCYTE [DISTWIDTH] IN BLOOD BY AUTOMATED COUNT: 13 % (ref 11.5–14.5)
EST. GFR  (NO RACE VARIABLE): >60 ML/MIN/1.73 M^2
ESTIMATED AVG GLUCOSE: 126 MG/DL (ref 68–131)
GLUCOSE SERPL-MCNC: 91 MG/DL (ref 70–110)
HBA1C MFR BLD: 6 % (ref 4–5.6)
HCT VFR BLD AUTO: 39.9 % (ref 37–48.5)
HDLC SERPL-MCNC: 56 MG/DL (ref 40–75)
HDLC SERPL: 32.6 % (ref 20–50)
HGB BLD-MCNC: 12.9 G/DL (ref 12–16)
IMM GRANULOCYTES # BLD AUTO: 0.01 K/UL (ref 0–0.04)
IMM GRANULOCYTES NFR BLD AUTO: 0.2 % (ref 0–0.5)
LDLC SERPL CALC-MCNC: 106.6 MG/DL (ref 63–159)
LYMPHOCYTES # BLD AUTO: 0.9 K/UL (ref 1–4.8)
LYMPHOCYTES NFR BLD: 21.1 % (ref 18–48)
MCH RBC QN AUTO: 29.7 PG (ref 27–31)
MCHC RBC AUTO-ENTMCNC: 32.3 G/DL (ref 32–36)
MCV RBC AUTO: 92 FL (ref 82–98)
MONOCYTES # BLD AUTO: 0.5 K/UL (ref 0.3–1)
MONOCYTES NFR BLD: 11.4 % (ref 4–15)
NEUTROPHILS # BLD AUTO: 2.5 K/UL (ref 1.8–7.7)
NEUTROPHILS NFR BLD: 59.7 % (ref 38–73)
NONHDLC SERPL-MCNC: 116 MG/DL
NRBC BLD-RTO: 0 /100 WBC
PLATELET # BLD AUTO: 222 K/UL (ref 150–450)
PMV BLD AUTO: 8.7 FL (ref 9.2–12.9)
POTASSIUM SERPL-SCNC: 4.8 MMOL/L (ref 3.5–5.1)
PROT SERPL-MCNC: 6.9 G/DL (ref 6–8.4)
RBC # BLD AUTO: 4.35 M/UL (ref 4–5.4)
SODIUM SERPL-SCNC: 141 MMOL/L (ref 136–145)
TRIGL SERPL-MCNC: 47 MG/DL (ref 30–150)
TSH SERPL DL<=0.005 MIU/L-ACNC: 2.67 UIU/ML (ref 0.4–4)
WBC # BLD AUTO: 4.21 K/UL (ref 3.9–12.7)

## 2025-02-12 PROCEDURE — 84443 ASSAY THYROID STIM HORMONE: CPT | Performed by: FAMILY MEDICINE

## 2025-02-12 PROCEDURE — 36415 COLL VENOUS BLD VENIPUNCTURE: CPT | Mod: PO | Performed by: FAMILY MEDICINE

## 2025-02-12 PROCEDURE — 80061 LIPID PANEL: CPT | Performed by: FAMILY MEDICINE

## 2025-02-12 PROCEDURE — 85025 COMPLETE CBC W/AUTO DIFF WBC: CPT | Performed by: FAMILY MEDICINE

## 2025-02-12 PROCEDURE — 83036 HEMOGLOBIN GLYCOSYLATED A1C: CPT | Performed by: FAMILY MEDICINE

## 2025-02-12 PROCEDURE — 80053 COMPREHEN METABOLIC PANEL: CPT | Performed by: FAMILY MEDICINE

## 2025-02-17 ENCOUNTER — OFFICE VISIT (OUTPATIENT)
Dept: FAMILY MEDICINE | Facility: CLINIC | Age: 80
End: 2025-02-17
Payer: MEDICARE

## 2025-02-17 VITALS
HEIGHT: 59 IN | OXYGEN SATURATION: 98 % | RESPIRATION RATE: 16 BRPM | TEMPERATURE: 98 F | HEART RATE: 68 BPM | BODY MASS INDEX: 25.52 KG/M2 | SYSTOLIC BLOOD PRESSURE: 140 MMHG | DIASTOLIC BLOOD PRESSURE: 68 MMHG | WEIGHT: 126.56 LBS

## 2025-02-17 DIAGNOSIS — Z78.0 ASYMPTOMATIC MENOPAUSAL STATE: ICD-10-CM

## 2025-02-17 DIAGNOSIS — Z00.00 ANNUAL PHYSICAL EXAM: Primary | ICD-10-CM

## 2025-02-17 DIAGNOSIS — M85.80 OSTEOPENIA, UNSPECIFIED LOCATION: ICD-10-CM

## 2025-02-17 DIAGNOSIS — K82.4 GALLBLADDER POLYP: ICD-10-CM

## 2025-02-17 DIAGNOSIS — E78.5 HYPERLIPIDEMIA, UNSPECIFIED HYPERLIPIDEMIA TYPE: ICD-10-CM

## 2025-02-17 DIAGNOSIS — I10 HYPERTENSION, ESSENTIAL: ICD-10-CM

## 2025-02-17 DIAGNOSIS — R73.03 PREDIABETES: ICD-10-CM

## 2025-02-17 PROCEDURE — 99397 PER PM REEVAL EST PAT 65+ YR: CPT | Mod: S$GLB,,, | Performed by: FAMILY MEDICINE

## 2025-02-17 PROCEDURE — 1126F AMNT PAIN NOTED NONE PRSNT: CPT | Mod: CPTII,S$GLB,, | Performed by: FAMILY MEDICINE

## 2025-02-17 PROCEDURE — 1100F PTFALLS ASSESS-DOCD GE2>/YR: CPT | Mod: CPTII,S$GLB,, | Performed by: FAMILY MEDICINE

## 2025-02-17 PROCEDURE — 3288F FALL RISK ASSESSMENT DOCD: CPT | Mod: CPTII,S$GLB,, | Performed by: FAMILY MEDICINE

## 2025-02-17 PROCEDURE — 3077F SYST BP >= 140 MM HG: CPT | Mod: CPTII,S$GLB,, | Performed by: FAMILY MEDICINE

## 2025-02-17 PROCEDURE — 1160F RVW MEDS BY RX/DR IN RCRD: CPT | Mod: CPTII,S$GLB,, | Performed by: FAMILY MEDICINE

## 2025-02-17 PROCEDURE — 1159F MED LIST DOCD IN RCRD: CPT | Mod: CPTII,S$GLB,, | Performed by: FAMILY MEDICINE

## 2025-02-17 PROCEDURE — 1157F ADVNC CARE PLAN IN RCRD: CPT | Mod: CPTII,S$GLB,, | Performed by: FAMILY MEDICINE

## 2025-02-17 PROCEDURE — 3078F DIAST BP <80 MM HG: CPT | Mod: CPTII,S$GLB,, | Performed by: FAMILY MEDICINE

## 2025-02-17 RX ORDER — ALENDRONATE SODIUM 70 MG/1
70 TABLET ORAL
Qty: 12 TABLET | Refills: 3 | Status: SHIPPED | OUTPATIENT
Start: 2025-02-17 | End: 2026-02-17

## 2025-02-17 RX ORDER — PRAVASTATIN SODIUM 40 MG/1
40 TABLET ORAL DAILY
Qty: 90 TABLET | Refills: 0 | Status: SHIPPED | OUTPATIENT
Start: 2025-02-17 | End: 2026-02-17

## 2025-02-17 RX ORDER — ALENDRONATE SODIUM 70 MG/1
70 TABLET ORAL
Qty: 4 TABLET | Refills: 11 | Status: SHIPPED | OUTPATIENT
Start: 2025-02-17 | End: 2025-02-17

## 2025-02-18 NOTE — PROGRESS NOTES
Subjective:       Patient ID: Cassidy Stanford is a 79 y.o. female.    Chief Complaint: Annual Exam    History of Present Illness    CHIEF COMPLAINT:  Patient presents today for her annual visit.  Overall, she is doing well except for persistent right knee pain.  Since I have seen her last, she did have a fall in July that resulted in an intertrochanteric proximal femur fracture.  As result, she had surgery for an intramedullary fixation of the right intertrochanteric proximal femur fracture.  She has recovered well.  She is getting back to physical activities at the gym.  She recently had labs which reviewed today.    MUSCULOSKELETAL:  Regarding her right knee pain, she has been receiving gel injection provided temporary relief for one month but effects are now wearing off. Platelet treatment was ineffective in managing symptoms. She is seeing the orthopedist at [facility] for this issue    COPD:  She continues to see the pulmonologist annually.  She experiences morning congestion that clears throughout the day. She uses Mucinex as needed for worsening cough. She takes Trelegy and Singulair daily for respiratory management.    DERMATOLOGIC:  She has persistent warts on her leg and other areas of her body. Home treatments have been ineffective.    PRE DIABETES:  A1C has increased to 6.0 from previously stable level of 5.7 over the past five years.     HYPERLIPIDEMIA:  Her recent total cholesterol was 172 up from prior value of 163 and her LDL was 106 up from prior value of 90.  She is currently taking Pravachol 20 mg and we did discuss increasing this dose of medicine which she would be interested in doing    HYPERTENSION:  Today her blood pressure is 140/68.  She is currently taking Benicar 5 mg once a day.        Review of Systems   Constitutional:  Negative for appetite change, chills, diaphoresis, fatigue, fever and unexpected weight change.   HENT:  Negative for congestion, dental problem, ear pain, hearing loss,  postnasal drip, rhinorrhea, sneezing, sore throat and trouble swallowing.    Eyes:  Negative for photophobia, pain, discharge and visual disturbance.   Respiratory:  Negative for cough, chest tightness, shortness of breath and wheezing.    Cardiovascular:  Negative for chest pain, palpitations and leg swelling.   Gastrointestinal:  Negative for abdominal distention, abdominal pain, blood in stool, constipation, diarrhea, nausea and vomiting.   Endocrine: Negative for cold intolerance, heat intolerance, polydipsia and polyuria.   Genitourinary:  Negative for dysuria, flank pain, frequency, genital sores, hematuria, menstrual problem and vaginal discharge.   Musculoskeletal:  Negative for arthralgias, joint swelling and myalgias.        Per HPI   Skin:  Negative for rash.   Neurological:  Negative for dizziness, syncope, light-headedness and headaches.   Hematological:  Negative for adenopathy. Does not bruise/bleed easily.   Psychiatric/Behavioral:  Negative for dysphoric mood, self-injury, sleep disturbance and suicidal ideas. The patient is not nervous/anxious.          Objective:      Physical Exam  Constitutional:       General: She is not in acute distress.     Appearance: Normal appearance. She is well-developed.   HENT:      Head: Normocephalic and atraumatic.      Right Ear: Hearing, tympanic membrane, ear canal and external ear normal.      Left Ear: Hearing, tympanic membrane, ear canal and external ear normal.      Nose: Nose normal.      Mouth/Throat:      Mouth: No oral lesions.      Pharynx: No oropharyngeal exudate or posterior oropharyngeal erythema.   Eyes:      General: Lids are normal. No scleral icterus.     Extraocular Movements: Extraocular movements intact.      Conjunctiva/sclera: Conjunctivae normal.      Pupils: Pupils are equal, round, and reactive to light.   Neck:      Thyroid: No thyroid mass or thyromegaly.      Vascular: No carotid bruit.   Cardiovascular:      Rate and Rhythm: Normal  "rate and regular rhythm. No extrasystoles are present.     Chest Wall: PMI is not displaced.      Heart sounds: Normal heart sounds. No murmur heard.     No gallop.   Pulmonary:      Effort: Pulmonary effort is normal. No accessory muscle usage or respiratory distress.      Breath sounds: Normal breath sounds.   Abdominal:      General: Bowel sounds are normal. There is no abdominal bruit.      Palpations: Abdomen is soft.      Tenderness: There is no abdominal tenderness. There is no rebound.   Musculoskeletal:      Cervical back: Normal range of motion and neck supple.   Lymphadenopathy:      Head:      Right side of head: No submental or submandibular adenopathy.      Left side of head: No submental or submandibular adenopathy.      Cervical:      Right cervical: No superficial, deep or posterior cervical adenopathy.     Left cervical: No superficial, deep or posterior cervical adenopathy.      Upper Body:      Right upper body: No supraclavicular adenopathy.      Left upper body: No supraclavicular adenopathy.   Skin:     General: Skin is warm and dry.      Comments: Skin with seborrheic keratoses noted on leg and chest.  These were treated with cryotherapy which she tolerated well   Neurological:      Mental Status: She is alert and oriented to person, place, and time.      Cranial Nerves: No cranial nerve deficit.      Sensory: No sensory deficit.   Psychiatric:         Speech: Speech normal.         Behavior: Behavior normal.         Thought Content: Thought content normal.       Blood pressure (!) 140/68, pulse 68, temperature 98.2 °F (36.8 °C), temperature source Temporal, resp. rate 16, height 4' 11" (1.499 m), weight 57.4 kg (126 lb 8.7 oz), SpO2 98%.Body mass index is 25.56 kg/m².          Assessment & Plan    1. Considering knee replacement surgery due to persistent pain and functional limitations despite prior gel injection  2. Recommend bone strengthening medication (Fosamax) due to previous " osteopenia diagnosis and recent fracture  3. Increased cholesterol medication dose due to elevated LDL levels  4. Monitoring blood pressure and A1C level more closely due to slightly elevated readings  5. Treated presumed warts with cryotherapy  6. Recommend gallbladder ultrasound to monitor previously identified polyps    ASTHMA-COPD OVERLAP SYNDROME:  - Patient's chronic lung condition is being managed with daily Trilegy and Singulair.  - Advised to continue using Mucinex as needed for cough management, particularly for the daily morning cough that clears up later in the day.  - Confirmed annual specialist visits for lung condition.  - Current treatment plan and medication regimen remain unchanged.    KNEE OSTEOARTHRITIS:  - Patient reports knee discomfort, possibly causing a fall, which resulted in the intertrochanteric fracture.  - Acknowledged worsening arthritis and recent gel shot wearing off.  - Discussed treatment options including steroid injections, gel shots, platelet-rich plasma (PRP) injections, and nerve blocks.  - Scheduled follow-up with orthopedic surgeon in May to discuss potential knee replacement surgery.  - Recommend waiting until July for knee replacement surgery, as previously advised by the hip replacement surgeon.    INTERTROCHANTERIC FEMUR FRACTURE:  - Completed physical therapy for 2.5 to 3 month  - Continues to exercise daily at the gym.    OSTEOPENIA:  - Discussed bone strength and considered bone-specific medicine due to recent fracture.  - Recommend and prescribed Fosamax (alendronate) to strengthen bones and reduce fracture risk.  - Explained mechanism of action, potential side effects, and proper administration.  - Instructed to take once weekly, first thing in the morning on an empty stomach with 8 ounces of water and remain upright for 30 minutes after.  - Advised to continue calcium and vitamin D supplements.  - Informed about typical 5-year treatment duration of Fosamax  treatment and need for periodic bone density scans.  - Ordered bone density scan for current baseline    PREDIABETES:  - Noted A1C increase to 6.0, crossing a threshold.  - Ordered A1C test and scheduled follow up in 3 months to reassess    HYPERLIPIDEMIA:  - Noted cholesterol level changes: total cholesterol increased from 160s to 170s, triglycerides decreased by 16 points, HDL slightly lower but good, and LDL increased by 16 points.  - Expressed concern about LDL increase.  - Increased Pravachol from 20mg to 40mg daily for cholesterol management  - Ordered lipid panel and scheduled follow up in 3 months to reassess cholesterol levels.    HYPERTENSION:  - Noted high blood pressure during visit (140/68).  - Continued Benazepril 5mg daily for management.  - Instructed patient to monitor blood pressure at home weekly and contact the office if readings are consistently elevated.  - Acknowledged room to increase medication dosage if needed.  - Instructed nurse to schedule another blood pressure check.    GALLBLADDER POLYPS:  - Noted history of small polyps in the gallbladder, first detected in December 2020.  - Ordered gallbladder ultrasound to check if polyps are growing, to be scheduled along with the bone scan.    SEBORRHEIC KERATOSIS:  - Examined multiple growths on the leg that won't respond to self-treatment, suggesting they might be seborrheic keratosis rather than warts.  - Performed cryotherapy treatment using liquid nitrogen, applying multiple cycles to each growth.  - Advised patient on post-treatment care, including managing blisters and monitoring healing.    ANNUAL EXAM:  - health maintenance issues and anticipatory guidance issues were discussed  - Advised to maintain regular exercise routine, including resistance exercises.           This note was generated with the assistance of ambient listening technology. Verbal consent was obtained by the patient and accompanying visitor(s) for the recording of  patient appointment to facilitate this note. I attest to having reviewed and edited the generated note for accuracy, though some syntax or spelling errors may persist. Please contact the author of this note for any clarification.

## 2025-02-28 ENCOUNTER — HOSPITAL ENCOUNTER (OUTPATIENT)
Dept: RADIOLOGY | Facility: HOSPITAL | Age: 80
Discharge: HOME | End: 2025-02-28
Attending: FAMILY MEDICINE
Payer: MEDICARE

## 2025-02-28 DIAGNOSIS — K82.4 GALLBLADDER POLYP: ICD-10-CM

## 2025-02-28 DIAGNOSIS — M85.80 OSTEOPENIA, UNSPECIFIED LOCATION: ICD-10-CM

## 2025-02-28 DIAGNOSIS — Z78.0 ASYMPTOMATIC MENOPAUSAL STATE: ICD-10-CM

## 2025-02-28 PROCEDURE — 76705 ECHO EXAM OF ABDOMEN: CPT | Mod: TC,PO

## 2025-02-28 PROCEDURE — 77080 DXA BONE DENSITY AXIAL: CPT | Mod: 26,,, | Performed by: RADIOLOGY

## 2025-02-28 PROCEDURE — 77092 TBS I&R FX RSK QHP: CPT | Mod: ,,, | Performed by: RADIOLOGY

## 2025-02-28 PROCEDURE — 76705 ECHO EXAM OF ABDOMEN: CPT | Mod: 26,,, | Performed by: STUDENT IN AN ORGANIZED HEALTH CARE EDUCATION/TRAINING PROGRAM

## 2025-02-28 PROCEDURE — 77091 TBS TECHL CALCULATION ONLY: CPT | Mod: PO

## 2025-03-01 ENCOUNTER — PATIENT MESSAGE (OUTPATIENT)
Dept: FAMILY MEDICINE | Facility: CLINIC | Age: 80
End: 2025-03-01
Payer: MEDICARE

## 2025-03-04 ENCOUNTER — PATIENT MESSAGE (OUTPATIENT)
Dept: FAMILY MEDICINE | Facility: CLINIC | Age: 80
End: 2025-03-04
Payer: MEDICARE

## 2025-03-10 VITALS — SYSTOLIC BLOOD PRESSURE: 114 MMHG | DIASTOLIC BLOOD PRESSURE: 57 MMHG

## 2025-03-12 RX ORDER — OLMESARTAN MEDOXOMIL 5 MG/1
5 TABLET ORAL
Qty: 90 TABLET | Refills: 3 | Status: SHIPPED | OUTPATIENT
Start: 2025-03-12

## 2025-03-13 NOTE — TELEPHONE ENCOUNTER
Refill Decision Note   Cassidyjadyn Cowartlouis  is requesting a refill authorization.  Brief Assessment and Rationale for Refill:  Approve     Medication Therapy Plan:         Comments:     Note composed:11:38 PM 03/12/2025

## 2025-03-13 NOTE — TELEPHONE ENCOUNTER
No care due was identified.  [facility] Embedded Care Due Messages. Reference number: [id number].   3/12/2025 9:46:36 PM CDT

## 2025-04-25 NOTE — TELEPHONE ENCOUNTER
No care due was identified.  [facility] Embedded Care Due Messages. Reference number: [id number].   4/25/2025 3:40:18 PM CDT

## 2025-04-25 NOTE — TELEPHONE ENCOUNTER
Refill Routing Note   Medication(s) are not appropriate for processing by [facility] for the following reason(s):        New or recently adjusted medication    [facility] action(s):  Defer               Appointments  past 12m or future 3m with PCP    Date Provider   Last Visit   2/17/2025 Radha Danielle MD   Next Visit   Visit date not found Radha Danielle MD   ED visits in past 90 days: 0        Note composed:4:40 PM 04/25/2025

## 2025-04-27 RX ORDER — PRAVASTATIN SODIUM 40 MG/1
40 TABLET ORAL DAILY
Qty: 90 TABLET | Refills: 0 | Status: SHIPPED | OUTPATIENT
Start: 2025-04-27 | End: 2025-05-18 | Stop reason: SDUPTHER

## 2025-05-16 ENCOUNTER — LAB VISIT (OUTPATIENT)
Dept: LAB | Facility: HOSPITAL | Age: 80
End: 2025-05-16
Attending: FAMILY MEDICINE
Payer: MEDICARE

## 2025-05-16 DIAGNOSIS — R73.03 PREDIABETES: ICD-10-CM

## 2025-05-16 DIAGNOSIS — E78.5 HYPERLIPIDEMIA, UNSPECIFIED HYPERLIPIDEMIA TYPE: ICD-10-CM

## 2025-05-16 LAB
CHOLEST SERPL-MCNC: 157 MG/DL (ref 120–199)
CHOLEST/HDLC SERPL: 2.8 {RATIO} (ref 2–5)
EAG (OHS): 126 MG/DL (ref 68–131)
HBA1C MFR BLD: 6 % (ref 4–5.6)
HDLC SERPL-MCNC: 56 MG/DL (ref 40–75)
HDLC SERPL: 35.7 % (ref 20–50)
LDLC SERPL CALC-MCNC: 92 MG/DL (ref 63–159)
NONHDLC SERPL-MCNC: 101 MG/DL
TRIGL SERPL-MCNC: 45 MG/DL (ref 30–150)

## 2025-05-16 PROCEDURE — 36415 COLL VENOUS BLD VENIPUNCTURE: CPT | Mod: PO

## 2025-05-16 PROCEDURE — 82465 ASSAY BLD/SERUM CHOLESTEROL: CPT

## 2025-05-16 PROCEDURE — 83036 HEMOGLOBIN GLYCOSYLATED A1C: CPT

## 2025-05-18 ENCOUNTER — PATIENT MESSAGE (OUTPATIENT)
Dept: FAMILY MEDICINE | Facility: CLINIC | Age: 80
End: 2025-05-18
Payer: MEDICARE

## 2025-05-18 RX ORDER — PRAVASTATIN SODIUM 40 MG/1
40 TABLET ORAL DAILY
Qty: 90 TABLET | Refills: 3 | Status: SHIPPED | OUTPATIENT
Start: 2025-05-18

## 2025-07-07 NOTE — TELEPHONE ENCOUNTER
No care due was identified.  [facility] Embedded Care Due Messages. Reference number: [id number].   7/06/2025 8:26:25 PM CDT

## 2025-07-08 RX ORDER — PRAVASTATIN SODIUM 40 MG/1
40 TABLET ORAL DAILY
Qty: 90 TABLET | Refills: 3 | Status: SHIPPED | OUTPATIENT
Start: 2025-07-08

## 2025-08-26 DIAGNOSIS — J44.9 CHRONIC OBSTRUCTIVE PULMONARY DISEASE, UNSPECIFIED COPD TYPE: ICD-10-CM

## 2025-08-27 RX ORDER — FLUTICASONE FUROATE, UMECLIDINIUM BROMIDE AND VILANTEROL TRIFENATATE 200; 62.5; 25 UG/1; UG/1; UG/1
POWDER RESPIRATORY (INHALATION)
Qty: 180 EACH | Refills: 3 | Status: SHIPPED | OUTPATIENT
Start: 2025-08-27

## 2025-08-27 RX ORDER — MONTELUKAST SODIUM 10 MG/1
10 TABLET ORAL NIGHTLY
Qty: 90 TABLET | Refills: 3 | Status: SHIPPED | OUTPATIENT
Start: 2025-08-27

## 2025-09-04 ENCOUNTER — TELEPHONE (OUTPATIENT)
Dept: RESEARCH | Facility: HOSPITAL | Age: 80
End: 2025-09-04
Payer: MEDICARE